# Patient Record
Sex: MALE | Race: WHITE | NOT HISPANIC OR LATINO | Employment: OTHER | URBAN - METROPOLITAN AREA
[De-identification: names, ages, dates, MRNs, and addresses within clinical notes are randomized per-mention and may not be internally consistent; named-entity substitution may affect disease eponyms.]

---

## 2017-01-04 ENCOUNTER — ALLSCRIPTS OFFICE VISIT (OUTPATIENT)
Dept: OTHER | Facility: OTHER | Age: 67
End: 2017-01-04

## 2017-01-17 ENCOUNTER — ALLSCRIPTS OFFICE VISIT (OUTPATIENT)
Dept: OTHER | Facility: OTHER | Age: 67
End: 2017-01-17

## 2017-01-17 ENCOUNTER — APPOINTMENT (OUTPATIENT)
Dept: LAB | Facility: MEDICAL CENTER | Age: 67
End: 2017-01-17
Payer: MEDICARE

## 2017-01-17 DIAGNOSIS — I48.91 ATRIAL FIBRILLATION (HCC): ICD-10-CM

## 2017-01-17 LAB
ANION GAP SERPL CALCULATED.3IONS-SCNC: 7 MMOL/L (ref 4–13)
BUN SERPL-MCNC: 16 MG/DL (ref 5–25)
CALCIUM SERPL-MCNC: 9 MG/DL (ref 8.3–10.1)
CHLORIDE SERPL-SCNC: 99 MMOL/L (ref 100–108)
CO2 SERPL-SCNC: 31 MMOL/L (ref 21–32)
CREAT SERPL-MCNC: 0.91 MG/DL (ref 0.6–1.3)
GFR SERPL CREATININE-BSD FRML MDRD: >60 ML/MIN/1.73SQ M
GLUCOSE SERPL-MCNC: 95 MG/DL (ref 65–140)
POTASSIUM SERPL-SCNC: 4.3 MMOL/L (ref 3.5–5.3)
SODIUM SERPL-SCNC: 137 MMOL/L (ref 136–145)

## 2017-01-17 PROCEDURE — 36415 COLL VENOUS BLD VENIPUNCTURE: CPT

## 2017-01-17 PROCEDURE — 80048 BASIC METABOLIC PNL TOTAL CA: CPT

## 2017-02-10 ENCOUNTER — ALLSCRIPTS OFFICE VISIT (OUTPATIENT)
Dept: OTHER | Facility: OTHER | Age: 67
End: 2017-02-10

## 2017-03-13 ENCOUNTER — ALLSCRIPTS OFFICE VISIT (OUTPATIENT)
Dept: OTHER | Facility: OTHER | Age: 67
End: 2017-03-13

## 2017-03-20 ENCOUNTER — ALLSCRIPTS OFFICE VISIT (OUTPATIENT)
Dept: OTHER | Facility: OTHER | Age: 67
End: 2017-03-20

## 2017-04-18 ENCOUNTER — ALLSCRIPTS OFFICE VISIT (OUTPATIENT)
Dept: OTHER | Facility: OTHER | Age: 67
End: 2017-04-18

## 2017-04-20 ENCOUNTER — ANESTHESIA EVENT (OUTPATIENT)
Dept: PERIOP | Facility: HOSPITAL | Age: 67
End: 2017-04-20

## 2017-04-21 ENCOUNTER — ANESTHESIA (OUTPATIENT)
Dept: PERIOP | Facility: HOSPITAL | Age: 67
End: 2017-04-21

## 2017-05-15 ENCOUNTER — ALLSCRIPTS OFFICE VISIT (OUTPATIENT)
Dept: OTHER | Facility: OTHER | Age: 67
End: 2017-05-15

## 2017-05-15 DIAGNOSIS — I10 ESSENTIAL (PRIMARY) HYPERTENSION: ICD-10-CM

## 2017-06-02 ENCOUNTER — ALLSCRIPTS OFFICE VISIT (OUTPATIENT)
Dept: OTHER | Facility: OTHER | Age: 67
End: 2017-06-02

## 2017-06-25 ENCOUNTER — APPOINTMENT (EMERGENCY)
Dept: RADIOLOGY | Facility: HOSPITAL | Age: 67
DRG: 248 | End: 2017-06-25
Payer: MEDICARE

## 2017-06-25 ENCOUNTER — HOSPITAL ENCOUNTER (INPATIENT)
Facility: HOSPITAL | Age: 67
LOS: 5 days | Discharge: HOME HEALTH | DRG: 248 | End: 2017-06-30
Attending: EMERGENCY MEDICINE | Admitting: INTERNAL MEDICINE
Payer: MEDICARE

## 2017-06-25 DIAGNOSIS — I10 HYPERTENSION, ESSENTIAL, BENIGN: ICD-10-CM

## 2017-06-25 DIAGNOSIS — I10 HYPERTENSION: ICD-10-CM

## 2017-06-25 DIAGNOSIS — I25.10 CAD (CORONARY ARTERY DISEASE): ICD-10-CM

## 2017-06-25 DIAGNOSIS — I48.91 A-FIB (HCC): ICD-10-CM

## 2017-06-25 DIAGNOSIS — I50.33 ACUTE ON CHRONIC DIASTOLIC CHF (CONGESTIVE HEART FAILURE), NYHA CLASS 1 (HCC): ICD-10-CM

## 2017-06-25 DIAGNOSIS — I50.9 CHF, ACUTE (HCC): ICD-10-CM

## 2017-06-25 DIAGNOSIS — R07.9 CHEST PAIN: Primary | ICD-10-CM

## 2017-06-25 LAB
ALBUMIN SERPL BCP-MCNC: 3.9 G/DL (ref 3.5–5)
ALP SERPL-CCNC: 122 U/L (ref 46–116)
ALT SERPL W P-5'-P-CCNC: 22 U/L (ref 12–78)
ANION GAP SERPL CALCULATED.3IONS-SCNC: 10 MMOL/L (ref 4–13)
APTT PPP: 33 SECONDS (ref 23–35)
AST SERPL W P-5'-P-CCNC: 25 U/L (ref 5–45)
BASOPHILS # BLD AUTO: 0.02 THOUSANDS/ΜL (ref 0–0.1)
BASOPHILS NFR BLD AUTO: 0 % (ref 0–1)
BILIRUB SERPL-MCNC: 1 MG/DL (ref 0.2–1)
BUN SERPL-MCNC: 11 MG/DL (ref 5–25)
CALCIUM SERPL-MCNC: 9.6 MG/DL (ref 8.3–10.1)
CHLORIDE SERPL-SCNC: 98 MMOL/L (ref 100–108)
CO2 SERPL-SCNC: 27 MMOL/L (ref 21–32)
CREAT SERPL-MCNC: 0.82 MG/DL (ref 0.6–1.3)
EOSINOPHIL # BLD AUTO: 0.05 THOUSAND/ΜL (ref 0–0.61)
EOSINOPHIL NFR BLD AUTO: 1 % (ref 0–6)
ERYTHROCYTE [DISTWIDTH] IN BLOOD BY AUTOMATED COUNT: 13.3 % (ref 11.6–15.1)
GFR SERPL CREATININE-BSD FRML MDRD: >60 ML/MIN/1.73SQ M
GLUCOSE SERPL-MCNC: 89 MG/DL (ref 65–140)
HCT VFR BLD AUTO: 35.1 % (ref 36.5–49.3)
HGB BLD-MCNC: 11.7 G/DL (ref 12–17)
INR PPP: 1.21 (ref 0.86–1.16)
LYMPHOCYTES # BLD AUTO: 0.71 THOUSANDS/ΜL (ref 0.6–4.47)
LYMPHOCYTES NFR BLD AUTO: 14 % (ref 14–44)
MCH RBC QN AUTO: 32.7 PG (ref 26.8–34.3)
MCHC RBC AUTO-ENTMCNC: 33.3 G/DL (ref 31.4–37.4)
MCV RBC AUTO: 98 FL (ref 82–98)
MONOCYTES # BLD AUTO: 0.39 THOUSAND/ΜL (ref 0.17–1.22)
MONOCYTES NFR BLD AUTO: 8 % (ref 4–12)
NEUTROPHILS # BLD AUTO: 3.75 THOUSANDS/ΜL (ref 1.85–7.62)
NEUTS SEG NFR BLD AUTO: 76 % (ref 43–75)
NRBC BLD AUTO-RTO: 0 /100 WBCS
NT-PROBNP SERPL-MCNC: 922 PG/ML
PLATELET # BLD AUTO: 158 THOUSANDS/UL (ref 149–390)
PMV BLD AUTO: 9.7 FL (ref 8.9–12.7)
POTASSIUM SERPL-SCNC: 4.2 MMOL/L (ref 3.5–5.3)
PROT SERPL-MCNC: 7.4 G/DL (ref 6.4–8.2)
PROTHROMBIN TIME: 15.5 SECONDS (ref 12.1–14.4)
RBC # BLD AUTO: 3.58 MILLION/UL (ref 3.88–5.62)
SODIUM SERPL-SCNC: 135 MMOL/L (ref 136–145)
TROPONIN I SERPL-MCNC: 0.02 NG/ML
TROPONIN I SERPL-MCNC: <0.02 NG/ML
TROPONIN I SERPL-MCNC: <0.02 NG/ML
WBC # BLD AUTO: 4.94 THOUSAND/UL (ref 4.31–10.16)

## 2017-06-25 PROCEDURE — 93005 ELECTROCARDIOGRAM TRACING: CPT | Performed by: PHYSICIAN ASSISTANT

## 2017-06-25 PROCEDURE — 96374 THER/PROPH/DIAG INJ IV PUSH: CPT

## 2017-06-25 PROCEDURE — 71020 HB CHEST X-RAY 2VW FRONTAL&LATL: CPT

## 2017-06-25 PROCEDURE — 84484 ASSAY OF TROPONIN QUANT: CPT | Performed by: PHYSICIAN ASSISTANT

## 2017-06-25 PROCEDURE — 99285 EMERGENCY DEPT VISIT HI MDM: CPT

## 2017-06-25 PROCEDURE — 84484 ASSAY OF TROPONIN QUANT: CPT | Performed by: INTERNAL MEDICINE

## 2017-06-25 PROCEDURE — 85025 COMPLETE CBC W/AUTO DIFF WBC: CPT | Performed by: PHYSICIAN ASSISTANT

## 2017-06-25 PROCEDURE — 80053 COMPREHEN METABOLIC PANEL: CPT | Performed by: PHYSICIAN ASSISTANT

## 2017-06-25 PROCEDURE — 85730 THROMBOPLASTIN TIME PARTIAL: CPT | Performed by: PHYSICIAN ASSISTANT

## 2017-06-25 PROCEDURE — 85610 PROTHROMBIN TIME: CPT | Performed by: PHYSICIAN ASSISTANT

## 2017-06-25 PROCEDURE — 36415 COLL VENOUS BLD VENIPUNCTURE: CPT | Performed by: PHYSICIAN ASSISTANT

## 2017-06-25 PROCEDURE — 83880 ASSAY OF NATRIURETIC PEPTIDE: CPT | Performed by: PHYSICIAN ASSISTANT

## 2017-06-25 RX ORDER — ATORVASTATIN CALCIUM 40 MG/1
40 TABLET, FILM COATED ORAL DAILY
Status: DISCONTINUED | OUTPATIENT
Start: 2017-06-25 | End: 2017-06-30 | Stop reason: HOSPADM

## 2017-06-25 RX ORDER — OXYCODONE HYDROCHLORIDE AND ACETAMINOPHEN 5; 325 MG/1; MG/1
1 TABLET ORAL EVERY 6 HOURS PRN
Status: DISCONTINUED | OUTPATIENT
Start: 2017-06-25 | End: 2017-06-26

## 2017-06-25 RX ORDER — BUDESONIDE AND FORMOTEROL FUMARATE DIHYDRATE 160; 4.5 UG/1; UG/1
2 AEROSOL RESPIRATORY (INHALATION) 2 TIMES DAILY
Status: DISCONTINUED | OUTPATIENT
Start: 2017-06-25 | End: 2017-06-30 | Stop reason: HOSPADM

## 2017-06-25 RX ORDER — LORAZEPAM 2 MG/ML
0.5 INJECTION INTRAMUSCULAR ONCE
Status: COMPLETED | OUTPATIENT
Start: 2017-06-25 | End: 2017-06-25

## 2017-06-25 RX ORDER — SPIRONOLACTONE 25 MG/1
50 TABLET ORAL DAILY
Status: DISCONTINUED | OUTPATIENT
Start: 2017-06-25 | End: 2017-06-30 | Stop reason: HOSPADM

## 2017-06-25 RX ORDER — FUROSEMIDE 10 MG/ML
40 INJECTION INTRAMUSCULAR; INTRAVENOUS 2 TIMES DAILY
Status: DISCONTINUED | OUTPATIENT
Start: 2017-06-25 | End: 2017-06-25

## 2017-06-25 RX ORDER — LIDOCAINE 50 MG/G
1 PATCH TOPICAL DAILY
Status: DISCONTINUED | OUTPATIENT
Start: 2017-06-25 | End: 2017-06-30 | Stop reason: HOSPADM

## 2017-06-25 RX ORDER — NITROGLYCERIN 0.4 MG/1
0.4 TABLET SUBLINGUAL
Status: DISCONTINUED | OUTPATIENT
Start: 2017-06-25 | End: 2017-06-27

## 2017-06-25 RX ORDER — FUROSEMIDE 10 MG/ML
20 INJECTION INTRAMUSCULAR; INTRAVENOUS 2 TIMES DAILY
Status: DISCONTINUED | OUTPATIENT
Start: 2017-06-26 | End: 2017-06-28

## 2017-06-25 RX ORDER — CLOPIDOGREL BISULFATE 75 MG/1
75 TABLET ORAL DAILY
Status: DISCONTINUED | OUTPATIENT
Start: 2017-06-25 | End: 2017-06-30 | Stop reason: HOSPADM

## 2017-06-25 RX ORDER — MORPHINE SULFATE 2 MG/ML
2 INJECTION, SOLUTION INTRAMUSCULAR; INTRAVENOUS EVERY 4 HOURS PRN
Status: DISCONTINUED | OUTPATIENT
Start: 2017-06-25 | End: 2017-06-30 | Stop reason: HOSPADM

## 2017-06-25 RX ORDER — ONDANSETRON 2 MG/ML
4 INJECTION INTRAMUSCULAR; INTRAVENOUS EVERY 6 HOURS PRN
Status: DISCONTINUED | OUTPATIENT
Start: 2017-06-25 | End: 2017-06-27

## 2017-06-25 RX ORDER — QUETIAPINE FUMARATE 25 MG/1
50 TABLET, FILM COATED ORAL
Status: DISCONTINUED | OUTPATIENT
Start: 2017-06-25 | End: 2017-06-30 | Stop reason: HOSPADM

## 2017-06-25 RX ORDER — ALBUTEROL SULFATE 2.5 MG/3ML
2.5 SOLUTION RESPIRATORY (INHALATION) EVERY 6 HOURS PRN
Status: DISCONTINUED | OUTPATIENT
Start: 2017-06-25 | End: 2017-06-30 | Stop reason: HOSPADM

## 2017-06-25 RX ORDER — HEPARIN SODIUM 10000 [USP'U]/100ML
3-20 INJECTION, SOLUTION INTRAVENOUS
Status: DISCONTINUED | OUTPATIENT
Start: 2017-06-25 | End: 2017-06-27

## 2017-06-25 RX ORDER — ISOSORBIDE MONONITRATE 30 MG/1
30 TABLET, EXTENDED RELEASE ORAL DAILY
Status: DISCONTINUED | OUTPATIENT
Start: 2017-06-25 | End: 2017-06-30 | Stop reason: HOSPADM

## 2017-06-25 RX ORDER — HEPARIN SODIUM 1000 [USP'U]/ML
4000 INJECTION, SOLUTION INTRAVENOUS; SUBCUTANEOUS AS NEEDED
Status: DISCONTINUED | OUTPATIENT
Start: 2017-06-25 | End: 2017-06-27

## 2017-06-25 RX ORDER — DULOXETIN HYDROCHLORIDE 30 MG/1
60 CAPSULE, DELAYED RELEASE ORAL DAILY
Status: DISCONTINUED | OUTPATIENT
Start: 2017-06-25 | End: 2017-06-30 | Stop reason: HOSPADM

## 2017-06-25 RX ORDER — HEPARIN SODIUM 1000 [USP'U]/ML
4000 INJECTION, SOLUTION INTRAVENOUS; SUBCUTANEOUS ONCE
Status: COMPLETED | OUTPATIENT
Start: 2017-06-25 | End: 2017-06-25

## 2017-06-25 RX ORDER — HEPARIN SODIUM 1000 [USP'U]/ML
2000 INJECTION, SOLUTION INTRAVENOUS; SUBCUTANEOUS AS NEEDED
Status: DISCONTINUED | OUTPATIENT
Start: 2017-06-25 | End: 2017-06-27

## 2017-06-25 RX ORDER — LORAZEPAM 1 MG/1
2 TABLET ORAL EVERY 8 HOURS PRN
Status: DISCONTINUED | OUTPATIENT
Start: 2017-06-25 | End: 2017-06-26

## 2017-06-25 RX ORDER — DILTIAZEM HYDROCHLORIDE 180 MG/1
180 CAPSULE, COATED, EXTENDED RELEASE ORAL DAILY
Status: DISCONTINUED | OUTPATIENT
Start: 2017-06-26 | End: 2017-06-29

## 2017-06-25 RX ORDER — FUROSEMIDE 10 MG/ML
40 INJECTION INTRAMUSCULAR; INTRAVENOUS ONCE
Status: COMPLETED | OUTPATIENT
Start: 2017-06-25 | End: 2017-06-25

## 2017-06-25 RX ORDER — ZOLPIDEM TARTRATE 5 MG/1
5 TABLET ORAL
Status: DISCONTINUED | OUTPATIENT
Start: 2017-06-25 | End: 2017-06-30 | Stop reason: HOSPADM

## 2017-06-25 RX ADMIN — ISOSORBIDE MONONITRATE 30 MG: 30 TABLET, EXTENDED RELEASE ORAL at 17:59

## 2017-06-25 RX ADMIN — SPIRONOLACTONE 50 MG: 25 TABLET, FILM COATED ORAL at 18:02

## 2017-06-25 RX ADMIN — LORAZEPAM 0.5 MG: 2 INJECTION INTRAMUSCULAR; INTRAVENOUS at 13:44

## 2017-06-25 RX ADMIN — LORAZEPAM 2 MG: 1 TABLET ORAL at 18:25

## 2017-06-25 RX ADMIN — CLOPIDOGREL 75 MG: 75 TABLET, FILM COATED ORAL at 16:10

## 2017-06-25 RX ADMIN — ATORVASTATIN CALCIUM 40 MG: 40 TABLET, FILM COATED ORAL at 18:03

## 2017-06-25 RX ADMIN — HEPARIN SODIUM 4000 UNITS: 1000 INJECTION, SOLUTION INTRAVENOUS; SUBCUTANEOUS at 19:10

## 2017-06-25 RX ADMIN — DULOXETINE 60 MG: 60 CAPSULE, DELAYED RELEASE ORAL at 17:50

## 2017-06-25 RX ADMIN — BUDESONIDE AND FORMOTEROL FUMARATE DIHYDRATE 2 PUFF: 160; 4.5 AEROSOL RESPIRATORY (INHALATION) at 18:05

## 2017-06-25 RX ADMIN — FUROSEMIDE 40 MG: 10 INJECTION, SOLUTION INTRAMUSCULAR; INTRAVENOUS at 14:28

## 2017-06-25 RX ADMIN — OXYCODONE HYDROCHLORIDE AND ACETAMINOPHEN 1 TABLET: 5; 325 TABLET ORAL at 22:12

## 2017-06-25 RX ADMIN — QUETIAPINE FUMARATE 50 MG: 25 TABLET, FILM COATED ORAL at 22:11

## 2017-06-25 RX ADMIN — ALBUTEROL SULFATE 2.5 MG: 2.5 SOLUTION RESPIRATORY (INHALATION) at 15:21

## 2017-06-25 RX ADMIN — OXYCODONE HYDROCHLORIDE AND ACETAMINOPHEN 1 TABLET: 5; 325 TABLET ORAL at 16:11

## 2017-06-25 RX ADMIN — HEPARIN SODIUM AND DEXTROSE 11.1 UNITS/KG/HR: 10000; 5 INJECTION INTRAVENOUS at 19:12

## 2017-06-26 ENCOUNTER — APPOINTMENT (INPATIENT)
Dept: ULTRASOUND IMAGING | Facility: HOSPITAL | Age: 67
DRG: 248 | End: 2017-06-26
Payer: MEDICARE

## 2017-06-26 LAB
ANION GAP SERPL CALCULATED.3IONS-SCNC: 7 MMOL/L (ref 4–13)
APTT PPP: 49 SECONDS (ref 23–35)
APTT PPP: 54 SECONDS (ref 23–35)
APTT PPP: 59 SECONDS (ref 23–35)
APTT PPP: 64 SECONDS (ref 23–35)
ATRIAL RATE: 66 BPM
BUN SERPL-MCNC: 11 MG/DL (ref 5–25)
CALCIUM SERPL-MCNC: 8.4 MG/DL (ref 8.3–10.1)
CHLORIDE SERPL-SCNC: 101 MMOL/L (ref 100–108)
CO2 SERPL-SCNC: 28 MMOL/L (ref 21–32)
CREAT SERPL-MCNC: 0.77 MG/DL (ref 0.6–1.3)
ERYTHROCYTE [DISTWIDTH] IN BLOOD BY AUTOMATED COUNT: 13.5 % (ref 11.6–15.1)
GFR SERPL CREATININE-BSD FRML MDRD: >60 ML/MIN/1.73SQ M
GLUCOSE SERPL-MCNC: 118 MG/DL (ref 65–140)
HCT VFR BLD AUTO: 31.9 % (ref 36.5–49.3)
HGB BLD-MCNC: 10.7 G/DL (ref 12–17)
MAGNESIUM SERPL-MCNC: 2 MG/DL (ref 1.6–2.6)
MCH RBC QN AUTO: 33 PG (ref 26.8–34.3)
MCHC RBC AUTO-ENTMCNC: 33.5 G/DL (ref 31.4–37.4)
MCV RBC AUTO: 99 FL (ref 82–98)
PLATELET # BLD AUTO: 142 THOUSANDS/UL (ref 149–390)
PMV BLD AUTO: 9.5 FL (ref 8.9–12.7)
POTASSIUM SERPL-SCNC: 3.6 MMOL/L (ref 3.5–5.3)
QRS AXIS: 86 DEGREES
QRSD INTERVAL: 104 MS
QT INTERVAL: 426 MS
QTC INTERVAL: 439 MS
RBC # BLD AUTO: 3.24 MILLION/UL (ref 3.88–5.62)
SODIUM SERPL-SCNC: 136 MMOL/L (ref 136–145)
T WAVE AXIS: 151 DEGREES
VENTRICULAR RATE: 64 BPM
WBC # BLD AUTO: 4.27 THOUSAND/UL (ref 4.31–10.16)

## 2017-06-26 PROCEDURE — 80048 BASIC METABOLIC PNL TOTAL CA: CPT | Performed by: INTERNAL MEDICINE

## 2017-06-26 PROCEDURE — 85730 THROMBOPLASTIN TIME PARTIAL: CPT | Performed by: INTERNAL MEDICINE

## 2017-06-26 PROCEDURE — B2181ZZ FLUOROSCOPY OF LEFT INTERNAL MAMMARY BYPASS GRAFT USING LOW OSMOLAR CONTRAST: ICD-10-PCS | Performed by: INTERNAL MEDICINE

## 2017-06-26 PROCEDURE — 02703FZ DILATION OF CORONARY ARTERY, ONE ARTERY WITH THREE INTRALUMINAL DEVICES, PERCUTANEOUS APPROACH: ICD-10-PCS | Performed by: INTERNAL MEDICINE

## 2017-06-26 PROCEDURE — B41J1ZZ FLUOROSCOPY OF OTHER LOWER ARTERIES USING LOW OSMOLAR CONTRAST: ICD-10-PCS | Performed by: INTERNAL MEDICINE

## 2017-06-26 PROCEDURE — 85027 COMPLETE CBC AUTOMATED: CPT | Performed by: INTERNAL MEDICINE

## 2017-06-26 PROCEDURE — 83735 ASSAY OF MAGNESIUM: CPT | Performed by: INTERNAL MEDICINE

## 2017-06-26 PROCEDURE — B2111ZZ FLUOROSCOPY OF MULTIPLE CORONARY ARTERIES USING LOW OSMOLAR CONTRAST: ICD-10-PCS | Performed by: INTERNAL MEDICINE

## 2017-06-26 PROCEDURE — 76705 ECHO EXAM OF ABDOMEN: CPT

## 2017-06-26 PROCEDURE — 4A023N7 MEASUREMENT OF CARDIAC SAMPLING AND PRESSURE, LEFT HEART, PERCUTANEOUS APPROACH: ICD-10-PCS | Performed by: INTERNAL MEDICINE

## 2017-06-26 RX ORDER — OXYCODONE HYDROCHLORIDE AND ACETAMINOPHEN 5; 325 MG/1; MG/1
1 TABLET ORAL EVERY 6 HOURS PRN
Status: DISCONTINUED | OUTPATIENT
Start: 2017-06-26 | End: 2017-06-28

## 2017-06-26 RX ORDER — SODIUM CHLORIDE 9 MG/ML
125 INJECTION, SOLUTION INTRAVENOUS CONTINUOUS
Status: DISCONTINUED | OUTPATIENT
Start: 2017-06-26 | End: 2017-06-27

## 2017-06-26 RX ORDER — ASPIRIN 81 MG/1
324 TABLET, CHEWABLE ORAL ONCE
Status: DISCONTINUED | OUTPATIENT
Start: 2017-06-26 | End: 2017-06-30 | Stop reason: HOSPADM

## 2017-06-26 RX ORDER — OXYCODONE HYDROCHLORIDE AND ACETAMINOPHEN 5; 325 MG/1; MG/1
1 TABLET ORAL EVERY 6 HOURS
Status: DISCONTINUED | OUTPATIENT
Start: 2017-06-26 | End: 2017-06-26

## 2017-06-26 RX ORDER — LORAZEPAM 1 MG/1
2 TABLET ORAL EVERY 8 HOURS
Status: DISCONTINUED | OUTPATIENT
Start: 2017-06-26 | End: 2017-06-26

## 2017-06-26 RX ORDER — LORAZEPAM 1 MG/1
2 TABLET ORAL EVERY 8 HOURS PRN
Status: DISCONTINUED | OUTPATIENT
Start: 2017-06-26 | End: 2017-06-30 | Stop reason: HOSPADM

## 2017-06-26 RX ADMIN — OXYCODONE HYDROCHLORIDE AND ACETAMINOPHEN 1 TABLET: 5; 325 TABLET ORAL at 10:32

## 2017-06-26 RX ADMIN — HEPARIN SODIUM 2000 UNITS: 1000 INJECTION, SOLUTION INTRAVENOUS; SUBCUTANEOUS at 15:19

## 2017-06-26 RX ADMIN — DILTIAZEM HYDROCHLORIDE 180 MG: 180 CAPSULE, COATED, EXTENDED RELEASE ORAL at 08:07

## 2017-06-26 RX ADMIN — LORAZEPAM 2 MG: 1 TABLET ORAL at 04:37

## 2017-06-26 RX ADMIN — HEPARIN SODIUM 2000 UNITS: 1000 INJECTION, SOLUTION INTRAVENOUS; SUBCUTANEOUS at 22:32

## 2017-06-26 RX ADMIN — OXYCODONE HYDROCHLORIDE AND ACETAMINOPHEN 1 TABLET: 5; 325 TABLET ORAL at 22:37

## 2017-06-26 RX ADMIN — BUDESONIDE AND FORMOTEROL FUMARATE DIHYDRATE 2 PUFF: 160; 4.5 AEROSOL RESPIRATORY (INHALATION) at 17:08

## 2017-06-26 RX ADMIN — CLOPIDOGREL 75 MG: 75 TABLET, FILM COATED ORAL at 08:08

## 2017-06-26 RX ADMIN — HEPARIN SODIUM AND DEXTROSE 15.1 UNITS/KG/HR: 10000; 5 INJECTION INTRAVENOUS at 15:17

## 2017-06-26 RX ADMIN — DULOXETINE 60 MG: 60 CAPSULE, DELAYED RELEASE ORAL at 08:07

## 2017-06-26 RX ADMIN — ISOSORBIDE MONONITRATE 30 MG: 30 TABLET, EXTENDED RELEASE ORAL at 08:10

## 2017-06-26 RX ADMIN — SODIUM CHLORIDE 125 ML/HR: 0.9 INJECTION, SOLUTION INTRAVENOUS at 23:55

## 2017-06-26 RX ADMIN — OXYCODONE HYDROCHLORIDE AND ACETAMINOPHEN 1 TABLET: 5; 325 TABLET ORAL at 16:56

## 2017-06-26 RX ADMIN — ZOLPIDEM TARTRATE 5 MG: 5 TABLET, COATED ORAL at 22:02

## 2017-06-26 RX ADMIN — QUETIAPINE FUMARATE 50 MG: 25 TABLET, FILM COATED ORAL at 22:02

## 2017-06-26 RX ADMIN — OXYCODONE HYDROCHLORIDE AND ACETAMINOPHEN 1 TABLET: 5; 325 TABLET ORAL at 05:11

## 2017-06-26 RX ADMIN — SPIRONOLACTONE 50 MG: 25 TABLET, FILM COATED ORAL at 08:07

## 2017-06-26 RX ADMIN — HEPARIN SODIUM 2000 UNITS: 1000 INJECTION, SOLUTION INTRAVENOUS; SUBCUTANEOUS at 02:23

## 2017-06-26 RX ADMIN — LIDOCAINE 1 PATCH: 50 PATCH CUTANEOUS at 08:08

## 2017-06-26 RX ADMIN — FUROSEMIDE 20 MG: 10 INJECTION, SOLUTION INTRAMUSCULAR; INTRAVENOUS at 17:08

## 2017-06-26 RX ADMIN — BUDESONIDE AND FORMOTEROL FUMARATE DIHYDRATE 2 PUFF: 160; 4.5 AEROSOL RESPIRATORY (INHALATION) at 08:07

## 2017-06-26 RX ADMIN — ATORVASTATIN CALCIUM 40 MG: 40 TABLET, FILM COATED ORAL at 16:56

## 2017-06-26 RX ADMIN — FUROSEMIDE 20 MG: 10 INJECTION, SOLUTION INTRAMUSCULAR; INTRAVENOUS at 08:07

## 2017-06-26 RX ADMIN — LORAZEPAM 2 MG: 1 TABLET ORAL at 11:49

## 2017-06-26 RX ADMIN — LORAZEPAM 2 MG: 1 TABLET ORAL at 19:56

## 2017-06-26 RX ADMIN — ZOLPIDEM TARTRATE 5 MG: 5 TABLET, COATED ORAL at 00:07

## 2017-06-27 ENCOUNTER — APPOINTMENT (INPATIENT)
Dept: INTERVENTIONAL RADIOLOGY/VASCULAR | Facility: HOSPITAL | Age: 67
DRG: 248 | End: 2017-06-27
Attending: INTERNAL MEDICINE
Payer: MEDICARE

## 2017-06-27 ENCOUNTER — APPOINTMENT (INPATIENT)
Dept: NON INVASIVE DIAGNOSTICS | Facility: HOSPITAL | Age: 67
DRG: 248 | End: 2017-06-27
Payer: MEDICARE

## 2017-06-27 LAB
ANION GAP SERPL CALCULATED.3IONS-SCNC: 9 MMOL/L (ref 4–13)
APTT PPP: 118 SECONDS (ref 23–35)
ATRIAL RATE: 375 BPM
BASOPHILS # BLD AUTO: 0.03 THOUSANDS/ΜL (ref 0–0.1)
BASOPHILS NFR BLD AUTO: 1 % (ref 0–1)
BUN SERPL-MCNC: 12 MG/DL (ref 5–25)
CALCIUM SERPL-MCNC: 8.8 MG/DL (ref 8.3–10.1)
CHLORIDE SERPL-SCNC: 103 MMOL/L (ref 100–108)
CHOLEST SERPL-MCNC: 115 MG/DL (ref 50–200)
CO2 SERPL-SCNC: 28 MMOL/L (ref 21–32)
CREAT SERPL-MCNC: 0.79 MG/DL (ref 0.6–1.3)
EOSINOPHIL # BLD AUTO: 0.06 THOUSAND/ΜL (ref 0–0.61)
EOSINOPHIL NFR BLD AUTO: 1 % (ref 0–6)
ERYTHROCYTE [DISTWIDTH] IN BLOOD BY AUTOMATED COUNT: 13.7 % (ref 11.6–15.1)
GFR SERPL CREATININE-BSD FRML MDRD: >60 ML/MIN/1.73SQ M
GLUCOSE SERPL-MCNC: 104 MG/DL (ref 65–140)
HCT VFR BLD AUTO: 33.7 % (ref 36.5–49.3)
HDLC SERPL-MCNC: 62 MG/DL (ref 40–60)
HGB BLD-MCNC: 11.1 G/DL (ref 12–17)
INR PPP: 1.19 (ref 0.86–1.16)
KCT BLD-ACNC: 166 SEC (ref 89–137)
KCT BLD-ACNC: 183 SEC (ref 89–137)
LDLC SERPL CALC-MCNC: 48 MG/DL (ref 0–100)
LYMPHOCYTES # BLD AUTO: 0.81 THOUSANDS/ΜL (ref 0.6–4.47)
LYMPHOCYTES NFR BLD AUTO: 17 % (ref 14–44)
MAGNESIUM SERPL-MCNC: 2 MG/DL (ref 1.6–2.6)
MCH RBC QN AUTO: 32.6 PG (ref 26.8–34.3)
MCHC RBC AUTO-ENTMCNC: 32.9 G/DL (ref 31.4–37.4)
MCV RBC AUTO: 99 FL (ref 82–98)
MONOCYTES # BLD AUTO: 0.6 THOUSAND/ΜL (ref 0.17–1.22)
MONOCYTES NFR BLD AUTO: 13 % (ref 4–12)
NEUTROPHILS # BLD AUTO: 3.23 THOUSANDS/ΜL (ref 1.85–7.62)
NEUTS SEG NFR BLD AUTO: 68 % (ref 43–75)
NRBC BLD AUTO-RTO: 0 /100 WBCS
PLATELET # BLD AUTO: 126 THOUSANDS/UL (ref 149–390)
PMV BLD AUTO: 9.4 FL (ref 8.9–12.7)
POTASSIUM SERPL-SCNC: 3.4 MMOL/L (ref 3.5–5.3)
PROTHROMBIN TIME: 15.3 SECONDS (ref 12.1–14.4)
QRS AXIS: 70 DEGREES
QRSD INTERVAL: 104 MS
QT INTERVAL: 590 MS
QTC INTERVAL: 537 MS
RBC # BLD AUTO: 3.4 MILLION/UL (ref 3.88–5.62)
SODIUM SERPL-SCNC: 140 MMOL/L (ref 136–145)
SPECIMEN SOURCE: ABNORMAL
SPECIMEN SOURCE: ABNORMAL
T WAVE AXIS: 0 DEGREES
TRIGL SERPL-MCNC: 26 MG/DL
VENTRICULAR RATE: 50 BPM
WBC # BLD AUTO: 4.75 THOUSAND/UL (ref 4.31–10.16)

## 2017-06-27 PROCEDURE — 93456 R HRT CORONARY ARTERY ANGIO: CPT | Performed by: INTERNAL MEDICINE

## 2017-06-27 PROCEDURE — C1887 CATHETER, GUIDING: HCPCS | Performed by: INTERNAL MEDICINE

## 2017-06-27 PROCEDURE — C1876 STENT, NON-COA/NON-COV W/DEL: HCPCS

## 2017-06-27 PROCEDURE — C1894 INTRO/SHEATH, NON-LASER: HCPCS | Performed by: INTERNAL MEDICINE

## 2017-06-27 PROCEDURE — 80061 LIPID PANEL: CPT | Performed by: INTERNAL MEDICINE

## 2017-06-27 PROCEDURE — C1769 GUIDE WIRE: HCPCS | Performed by: INTERNAL MEDICINE

## 2017-06-27 PROCEDURE — 92928 PRQ TCAT PLMT NTRAC ST 1 LES: CPT | Performed by: INTERNAL MEDICINE

## 2017-06-27 PROCEDURE — 83735 ASSAY OF MAGNESIUM: CPT | Performed by: INTERNAL MEDICINE

## 2017-06-27 PROCEDURE — C1725 CATH, TRANSLUMIN NON-LASER: HCPCS | Performed by: INTERNAL MEDICINE

## 2017-06-27 PROCEDURE — 80048 BASIC METABOLIC PNL TOTAL CA: CPT | Performed by: INTERNAL MEDICINE

## 2017-06-27 PROCEDURE — 99153 MOD SED SAME PHYS/QHP EA: CPT | Performed by: INTERNAL MEDICINE

## 2017-06-27 PROCEDURE — 85025 COMPLETE CBC W/AUTO DIFF WBC: CPT | Performed by: INTERNAL MEDICINE

## 2017-06-27 PROCEDURE — 93306 TTE W/DOPPLER COMPLETE: CPT

## 2017-06-27 PROCEDURE — 85730 THROMBOPLASTIN TIME PARTIAL: CPT | Performed by: INTERNAL MEDICINE

## 2017-06-27 PROCEDURE — 99152 MOD SED SAME PHYS/QHP 5/>YRS: CPT | Performed by: INTERNAL MEDICINE

## 2017-06-27 PROCEDURE — 85610 PROTHROMBIN TIME: CPT | Performed by: INTERNAL MEDICINE

## 2017-06-27 PROCEDURE — 82810 BLOOD GASES O2 SAT ONLY: CPT | Performed by: INTERNAL MEDICINE

## 2017-06-27 PROCEDURE — 93005 ELECTROCARDIOGRAM TRACING: CPT | Performed by: INTERNAL MEDICINE

## 2017-06-27 PROCEDURE — 85347 COAGULATION TIME ACTIVATED: CPT

## 2017-06-27 PROCEDURE — 93458 L HRT ARTERY/VENTRICLE ANGIO: CPT | Performed by: INTERNAL MEDICINE

## 2017-06-27 RX ORDER — LIDOCAINE HYDROCHLORIDE 10 MG/ML
INJECTION, SOLUTION INFILTRATION; PERINEURAL CODE/TRAUMA/SEDATION MEDICATION
Status: COMPLETED | OUTPATIENT
Start: 2017-06-27 | End: 2017-06-27

## 2017-06-27 RX ORDER — MAGNESIUM HYDROXIDE/ALUMINUM HYDROXICE/SIMETHICONE 120; 1200; 1200 MG/30ML; MG/30ML; MG/30ML
30 SUSPENSION ORAL EVERY 6 HOURS PRN
Status: DISCONTINUED | OUTPATIENT
Start: 2017-06-27 | End: 2017-06-30 | Stop reason: HOSPADM

## 2017-06-27 RX ORDER — POTASSIUM CHLORIDE 20 MEQ/1
40 TABLET, EXTENDED RELEASE ORAL ONCE
Status: DISCONTINUED | OUTPATIENT
Start: 2017-06-27 | End: 2017-06-30 | Stop reason: HOSPADM

## 2017-06-27 RX ORDER — ONDANSETRON 2 MG/ML
4 INJECTION INTRAMUSCULAR; INTRAVENOUS EVERY 6 HOURS PRN
Status: DISCONTINUED | OUTPATIENT
Start: 2017-06-27 | End: 2017-06-30 | Stop reason: HOSPADM

## 2017-06-27 RX ORDER — CLOPIDOGREL BISULFATE 75 MG/1
TABLET ORAL CODE/TRAUMA/SEDATION MEDICATION
Status: COMPLETED | OUTPATIENT
Start: 2017-06-27 | End: 2017-06-27

## 2017-06-27 RX ORDER — FENTANYL CITRATE 50 UG/ML
INJECTION, SOLUTION INTRAMUSCULAR; INTRAVENOUS CODE/TRAUMA/SEDATION MEDICATION
Status: COMPLETED | OUTPATIENT
Start: 2017-06-27 | End: 2017-06-27

## 2017-06-27 RX ORDER — NITROGLYCERIN 0.4 MG/1
0.4 TABLET SUBLINGUAL
Status: DISCONTINUED | OUTPATIENT
Start: 2017-06-27 | End: 2017-06-30 | Stop reason: HOSPADM

## 2017-06-27 RX ORDER — MIDAZOLAM HYDROCHLORIDE 1 MG/ML
INJECTION INTRAMUSCULAR; INTRAVENOUS CODE/TRAUMA/SEDATION MEDICATION
Status: COMPLETED | OUTPATIENT
Start: 2017-06-27 | End: 2017-06-27

## 2017-06-27 RX ADMIN — MORPHINE SULFATE 2 MG: 2 INJECTION, SOLUTION INTRAMUSCULAR; INTRAVENOUS at 16:42

## 2017-06-27 RX ADMIN — SPIRONOLACTONE 50 MG: 25 TABLET, FILM COATED ORAL at 09:55

## 2017-06-27 RX ADMIN — DULOXETINE 60 MG: 60 CAPSULE, DELAYED RELEASE ORAL at 09:54

## 2017-06-27 RX ADMIN — MIDAZOLAM HYDROCHLORIDE 1 MG: 1 INJECTION, SOLUTION INTRAMUSCULAR; INTRAVENOUS at 11:18

## 2017-06-27 RX ADMIN — LORAZEPAM 2 MG: 1 TABLET ORAL at 12:25

## 2017-06-27 RX ADMIN — IOHEXOL 225 ML: 350 INJECTION, SOLUTION INTRAVENOUS at 11:49

## 2017-06-27 RX ADMIN — LIDOCAINE HYDROCHLORIDE 10 ML: 10 INJECTION, SOLUTION INFILTRATION; PERINEURAL at 10:21

## 2017-06-27 RX ADMIN — ISOSORBIDE MONONITRATE 30 MG: 30 TABLET, EXTENDED RELEASE ORAL at 09:55

## 2017-06-27 RX ADMIN — QUETIAPINE FUMARATE 50 MG: 25 TABLET, FILM COATED ORAL at 22:14

## 2017-06-27 RX ADMIN — OXYCODONE HYDROCHLORIDE AND ACETAMINOPHEN 1 TABLET: 5; 325 TABLET ORAL at 17:56

## 2017-06-27 RX ADMIN — LORAZEPAM 2 MG: 1 TABLET ORAL at 03:38

## 2017-06-27 RX ADMIN — BIVALIRUDIN 1.83 MG/KG/HR: 250 INJECTION, POWDER, LYOPHILIZED, FOR SOLUTION INTRAVENOUS at 10:52

## 2017-06-27 RX ADMIN — OXYCODONE HYDROCHLORIDE AND ACETAMINOPHEN 1 TABLET: 5; 325 TABLET ORAL at 12:26

## 2017-06-27 RX ADMIN — ATORVASTATIN CALCIUM 40 MG: 40 TABLET, FILM COATED ORAL at 16:39

## 2017-06-27 RX ADMIN — CLOPIDOGREL 75 MG: 75 TABLET, FILM COATED ORAL at 09:54

## 2017-06-27 RX ADMIN — FUROSEMIDE 20 MG: 10 INJECTION, SOLUTION INTRAMUSCULAR; INTRAVENOUS at 17:57

## 2017-06-27 RX ADMIN — FENTANYL CITRATE 50 MCG: 50 INJECTION, SOLUTION INTRAMUSCULAR; INTRAVENOUS at 10:30

## 2017-06-27 RX ADMIN — SODIUM CHLORIDE 125 ML/HR: 0.9 INJECTION, SOLUTION INTRAVENOUS at 08:31

## 2017-06-27 RX ADMIN — LORAZEPAM 2 MG: 1 TABLET ORAL at 19:58

## 2017-06-27 RX ADMIN — SODIUM CHLORIDE 125 ML/HR: 0.9 INJECTION, SOLUTION INTRAVENOUS at 19:59

## 2017-06-27 RX ADMIN — FENTANYL CITRATE 50 MCG: 50 INJECTION, SOLUTION INTRAMUSCULAR; INTRAVENOUS at 11:18

## 2017-06-27 RX ADMIN — MIDAZOLAM HYDROCHLORIDE 1 MG: 1 INJECTION, SOLUTION INTRAMUSCULAR; INTRAVENOUS at 10:29

## 2017-06-27 RX ADMIN — MORPHINE SULFATE 2 MG: 2 INJECTION, SOLUTION INTRAMUSCULAR; INTRAVENOUS at 08:26

## 2017-06-27 RX ADMIN — FENTANYL CITRATE 50 MCG: 50 INJECTION, SOLUTION INTRAMUSCULAR; INTRAVENOUS at 10:21

## 2017-06-27 RX ADMIN — ZOLPIDEM TARTRATE 5 MG: 5 TABLET, COATED ORAL at 22:14

## 2017-06-27 RX ADMIN — OXYCODONE HYDROCHLORIDE AND ACETAMINOPHEN 1 TABLET: 5; 325 TABLET ORAL at 04:45

## 2017-06-27 RX ADMIN — BUDESONIDE AND FORMOTEROL FUMARATE DIHYDRATE 2 PUFF: 160; 4.5 AEROSOL RESPIRATORY (INHALATION) at 17:57

## 2017-06-27 RX ADMIN — DILTIAZEM HYDROCHLORIDE 180 MG: 180 CAPSULE, COATED, EXTENDED RELEASE ORAL at 09:54

## 2017-06-27 RX ADMIN — MIDAZOLAM HYDROCHLORIDE 1 MG: 1 INJECTION, SOLUTION INTRAMUSCULAR; INTRAVENOUS at 10:21

## 2017-06-27 RX ADMIN — CLOPIDOGREL 300 MG: 75 TABLET, FILM COATED ORAL at 11:49

## 2017-06-28 LAB
ANION GAP SERPL CALCULATED.3IONS-SCNC: 11 MMOL/L (ref 4–13)
BUN SERPL-MCNC: 8 MG/DL (ref 5–25)
CALCIUM SERPL-MCNC: 8.8 MG/DL (ref 8.3–10.1)
CHLORIDE SERPL-SCNC: 104 MMOL/L (ref 100–108)
CO2 SERPL-SCNC: 25 MMOL/L (ref 21–32)
CREAT SERPL-MCNC: 0.83 MG/DL (ref 0.6–1.3)
ERYTHROCYTE [DISTWIDTH] IN BLOOD BY AUTOMATED COUNT: 13.8 % (ref 11.6–15.1)
GFR SERPL CREATININE-BSD FRML MDRD: >60 ML/MIN/1.73SQ M
GLUCOSE SERPL-MCNC: 97 MG/DL (ref 65–140)
HCT VFR BLD AUTO: 33.5 % (ref 36.5–49.3)
HGB BLD-MCNC: 11.1 G/DL (ref 12–17)
MCH RBC QN AUTO: 33.1 PG (ref 26.8–34.3)
MCHC RBC AUTO-ENTMCNC: 33.1 G/DL (ref 31.4–37.4)
MCV RBC AUTO: 100 FL (ref 82–98)
PLATELET # BLD AUTO: 132 THOUSANDS/UL (ref 149–390)
PMV BLD AUTO: 9.1 FL (ref 8.9–12.7)
POTASSIUM SERPL-SCNC: 3.7 MMOL/L (ref 3.5–5.3)
RBC # BLD AUTO: 3.35 MILLION/UL (ref 3.88–5.62)
SODIUM SERPL-SCNC: 140 MMOL/L (ref 136–145)
WBC # BLD AUTO: 4.33 THOUSAND/UL (ref 4.31–10.16)

## 2017-06-28 PROCEDURE — 80048 BASIC METABOLIC PNL TOTAL CA: CPT | Performed by: FAMILY MEDICINE

## 2017-06-28 PROCEDURE — 85027 COMPLETE CBC AUTOMATED: CPT | Performed by: PHYSICIAN ASSISTANT

## 2017-06-28 RX ORDER — OXYCODONE HYDROCHLORIDE AND ACETAMINOPHEN 5; 325 MG/1; MG/1
2 TABLET ORAL EVERY 6 HOURS PRN
Status: DISCONTINUED | OUTPATIENT
Start: 2017-06-28 | End: 2017-06-30 | Stop reason: HOSPADM

## 2017-06-28 RX ORDER — OXYCODONE HYDROCHLORIDE AND ACETAMINOPHEN 5; 325 MG/1; MG/1
1 TABLET ORAL ONCE
Status: COMPLETED | OUTPATIENT
Start: 2017-06-28 | End: 2017-06-28

## 2017-06-28 RX ORDER — FUROSEMIDE 40 MG/1
40 TABLET ORAL DAILY
Status: DISCONTINUED | OUTPATIENT
Start: 2017-06-28 | End: 2017-06-30 | Stop reason: HOSPADM

## 2017-06-28 RX ADMIN — OXYCODONE HYDROCHLORIDE AND ACETAMINOPHEN 1 TABLET: 5; 325 TABLET ORAL at 00:04

## 2017-06-28 RX ADMIN — DULOXETINE 60 MG: 60 CAPSULE, DELAYED RELEASE ORAL at 09:23

## 2017-06-28 RX ADMIN — OXYCODONE HYDROCHLORIDE AND ACETAMINOPHEN 1 TABLET: 5; 325 TABLET ORAL at 11:52

## 2017-06-28 RX ADMIN — MORPHINE SULFATE 2 MG: 2 INJECTION, SOLUTION INTRAMUSCULAR; INTRAVENOUS at 09:17

## 2017-06-28 RX ADMIN — FUROSEMIDE 40 MG: 40 TABLET ORAL at 11:55

## 2017-06-28 RX ADMIN — ISOSORBIDE MONONITRATE 30 MG: 30 TABLET, EXTENDED RELEASE ORAL at 09:24

## 2017-06-28 RX ADMIN — CLOPIDOGREL 75 MG: 75 TABLET, FILM COATED ORAL at 09:23

## 2017-06-28 RX ADMIN — DILTIAZEM HYDROCHLORIDE 180 MG: 180 CAPSULE, COATED, EXTENDED RELEASE ORAL at 09:24

## 2017-06-28 RX ADMIN — OXYCODONE HYDROCHLORIDE AND ACETAMINOPHEN 2 TABLET: 5; 325 TABLET ORAL at 18:08

## 2017-06-28 RX ADMIN — OXYCODONE HYDROCHLORIDE AND ACETAMINOPHEN 1 TABLET: 5; 325 TABLET ORAL at 12:21

## 2017-06-28 RX ADMIN — LORAZEPAM 2 MG: 1 TABLET ORAL at 14:32

## 2017-06-28 RX ADMIN — LORAZEPAM 2 MG: 1 TABLET ORAL at 03:52

## 2017-06-28 RX ADMIN — BUDESONIDE AND FORMOTEROL FUMARATE DIHYDRATE 2 PUFF: 160; 4.5 AEROSOL RESPIRATORY (INHALATION) at 09:33

## 2017-06-28 RX ADMIN — FUROSEMIDE 20 MG: 10 INJECTION, SOLUTION INTRAMUSCULAR; INTRAVENOUS at 09:17

## 2017-06-28 RX ADMIN — LIDOCAINE 1 PATCH: 50 PATCH CUTANEOUS at 09:24

## 2017-06-28 RX ADMIN — OXYCODONE HYDROCHLORIDE AND ACETAMINOPHEN 1 TABLET: 5; 325 TABLET ORAL at 05:43

## 2017-06-28 RX ADMIN — ZOLPIDEM TARTRATE 5 MG: 5 TABLET, COATED ORAL at 22:44

## 2017-06-28 RX ADMIN — SPIRONOLACTONE 50 MG: 25 TABLET, FILM COATED ORAL at 09:23

## 2017-06-28 RX ADMIN — ATORVASTATIN CALCIUM 40 MG: 40 TABLET, FILM COATED ORAL at 16:16

## 2017-06-28 RX ADMIN — QUETIAPINE FUMARATE 50 MG: 25 TABLET, FILM COATED ORAL at 21:07

## 2017-06-28 RX ADMIN — MORPHINE SULFATE 2 MG: 2 INJECTION, SOLUTION INTRAMUSCULAR; INTRAVENOUS at 16:16

## 2017-06-28 RX ADMIN — BUDESONIDE AND FORMOTEROL FUMARATE DIHYDRATE 2 PUFF: 160; 4.5 AEROSOL RESPIRATORY (INHALATION) at 18:50

## 2017-06-29 PROBLEM — R00.1 BRADYCARDIA: Status: ACTIVE | Noted: 2017-06-29

## 2017-06-29 LAB
ANION GAP SERPL CALCULATED.3IONS-SCNC: 9 MMOL/L (ref 4–13)
ATRIAL RATE: 39 BPM
BUN SERPL-MCNC: 11 MG/DL (ref 5–25)
CALCIUM SERPL-MCNC: 8.6 MG/DL (ref 8.3–10.1)
CHLORIDE SERPL-SCNC: 104 MMOL/L (ref 100–108)
CO2 SERPL-SCNC: 26 MMOL/L (ref 21–32)
CREAT SERPL-MCNC: 0.75 MG/DL (ref 0.6–1.3)
GFR SERPL CREATININE-BSD FRML MDRD: >60 ML/MIN/1.73SQ M
GLUCOSE SERPL-MCNC: 96 MG/DL (ref 65–140)
POTASSIUM SERPL-SCNC: 3.5 MMOL/L (ref 3.5–5.3)
QRS AXIS: 76 DEGREES
QRSD INTERVAL: 104 MS
QT INTERVAL: 480 MS
QTC INTERVAL: 463 MS
SODIUM SERPL-SCNC: 139 MMOL/L (ref 136–145)
T WAVE AXIS: -82 DEGREES
TROPONIN I SERPL-MCNC: 0.12 NG/ML
VENTRICULAR RATE: 56 BPM

## 2017-06-29 PROCEDURE — 84484 ASSAY OF TROPONIN QUANT: CPT | Performed by: PHYSICIAN ASSISTANT

## 2017-06-29 PROCEDURE — 80048 BASIC METABOLIC PNL TOTAL CA: CPT | Performed by: FAMILY MEDICINE

## 2017-06-29 PROCEDURE — 93005 ELECTROCARDIOGRAM TRACING: CPT | Performed by: PHYSICIAN ASSISTANT

## 2017-06-29 RX ORDER — ACETAMINOPHEN 325 MG/1
650 TABLET ORAL ONCE
Status: COMPLETED | OUTPATIENT
Start: 2017-06-29 | End: 2017-06-29

## 2017-06-29 RX ORDER — POTASSIUM CHLORIDE 29.8 MG/ML
40 INJECTION INTRAVENOUS ONCE
Status: DISCONTINUED | OUTPATIENT
Start: 2017-06-29 | End: 2017-06-29

## 2017-06-29 RX ORDER — DILTIAZEM HYDROCHLORIDE 120 MG/1
120 CAPSULE, COATED, EXTENDED RELEASE ORAL DAILY
Status: DISCONTINUED | OUTPATIENT
Start: 2017-06-30 | End: 2017-06-30

## 2017-06-29 RX ADMIN — ISOSORBIDE MONONITRATE 30 MG: 30 TABLET, EXTENDED RELEASE ORAL at 09:19

## 2017-06-29 RX ADMIN — LIDOCAINE 1 PATCH: 50 PATCH CUTANEOUS at 09:20

## 2017-06-29 RX ADMIN — LORAZEPAM 2 MG: 1 TABLET ORAL at 19:02

## 2017-06-29 RX ADMIN — MORPHINE SULFATE 2 MG: 2 INJECTION, SOLUTION INTRAMUSCULAR; INTRAVENOUS at 20:03

## 2017-06-29 RX ADMIN — ATORVASTATIN CALCIUM 40 MG: 40 TABLET, FILM COATED ORAL at 16:33

## 2017-06-29 RX ADMIN — ACETAMINOPHEN 650 MG: 325 TABLET ORAL at 06:55

## 2017-06-29 RX ADMIN — SPIRONOLACTONE 50 MG: 25 TABLET, FILM COATED ORAL at 09:18

## 2017-06-29 RX ADMIN — BUDESONIDE AND FORMOTEROL FUMARATE DIHYDRATE 2 PUFF: 160; 4.5 AEROSOL RESPIRATORY (INHALATION) at 17:25

## 2017-06-29 RX ADMIN — BUDESONIDE AND FORMOTEROL FUMARATE DIHYDRATE 2 PUFF: 160; 4.5 AEROSOL RESPIRATORY (INHALATION) at 09:20

## 2017-06-29 RX ADMIN — CLOPIDOGREL 75 MG: 75 TABLET, FILM COATED ORAL at 09:17

## 2017-06-29 RX ADMIN — OXYCODONE HYDROCHLORIDE AND ACETAMINOPHEN 2 TABLET: 5; 325 TABLET ORAL at 15:26

## 2017-06-29 RX ADMIN — OXYCODONE HYDROCHLORIDE AND ACETAMINOPHEN 2 TABLET: 5; 325 TABLET ORAL at 21:25

## 2017-06-29 RX ADMIN — OXYCODONE HYDROCHLORIDE AND ACETAMINOPHEN 2 TABLET: 5; 325 TABLET ORAL at 09:14

## 2017-06-29 RX ADMIN — OXYCODONE HYDROCHLORIDE AND ACETAMINOPHEN 2 TABLET: 5; 325 TABLET ORAL at 00:31

## 2017-06-29 RX ADMIN — DILTIAZEM HYDROCHLORIDE 180 MG: 180 CAPSULE, COATED, EXTENDED RELEASE ORAL at 09:18

## 2017-06-29 RX ADMIN — LORAZEPAM 2 MG: 1 TABLET ORAL at 11:09

## 2017-06-29 RX ADMIN — DULOXETINE 60 MG: 60 CAPSULE, DELAYED RELEASE ORAL at 09:18

## 2017-06-29 RX ADMIN — FUROSEMIDE 40 MG: 40 TABLET ORAL at 09:18

## 2017-06-30 VITALS
RESPIRATION RATE: 18 BRPM | OXYGEN SATURATION: 95 % | WEIGHT: 191.58 LBS | SYSTOLIC BLOOD PRESSURE: 144 MMHG | HEIGHT: 75 IN | DIASTOLIC BLOOD PRESSURE: 67 MMHG | BODY MASS INDEX: 23.82 KG/M2 | HEART RATE: 59 BPM | TEMPERATURE: 97.8 F

## 2017-06-30 DIAGNOSIS — I25.2 OLD MYOCARDIAL INFARCTION: ICD-10-CM

## 2017-06-30 PROBLEM — R07.9 CHEST PAIN: Status: RESOLVED | Noted: 2017-06-25 | Resolved: 2017-06-30

## 2017-06-30 LAB
ANION GAP SERPL CALCULATED.3IONS-SCNC: 9 MMOL/L (ref 4–13)
BUN SERPL-MCNC: 10 MG/DL (ref 5–25)
CALCIUM SERPL-MCNC: 8.7 MG/DL (ref 8.3–10.1)
CHLORIDE SERPL-SCNC: 103 MMOL/L (ref 100–108)
CO2 SERPL-SCNC: 26 MMOL/L (ref 21–32)
CREAT SERPL-MCNC: 0.73 MG/DL (ref 0.6–1.3)
GFR SERPL CREATININE-BSD FRML MDRD: >60 ML/MIN/1.73SQ M
GLUCOSE SERPL-MCNC: 101 MG/DL (ref 65–140)
POTASSIUM SERPL-SCNC: 3.6 MMOL/L (ref 3.5–5.3)
SODIUM SERPL-SCNC: 138 MMOL/L (ref 136–145)

## 2017-06-30 PROCEDURE — 80048 BASIC METABOLIC PNL TOTAL CA: CPT | Performed by: FAMILY MEDICINE

## 2017-06-30 RX ORDER — ASPIRIN 81 MG/1
81 TABLET, CHEWABLE ORAL DAILY
Refills: 0
Start: 2017-06-30

## 2017-06-30 RX ADMIN — BUDESONIDE AND FORMOTEROL FUMARATE DIHYDRATE 2 PUFF: 160; 4.5 AEROSOL RESPIRATORY (INHALATION) at 09:13

## 2017-06-30 RX ADMIN — ZOLPIDEM TARTRATE 5 MG: 5 TABLET, COATED ORAL at 00:11

## 2017-06-30 RX ADMIN — OXYCODONE HYDROCHLORIDE AND ACETAMINOPHEN 2 TABLET: 5; 325 TABLET ORAL at 03:56

## 2017-06-30 RX ADMIN — ISOSORBIDE MONONITRATE 30 MG: 30 TABLET, EXTENDED RELEASE ORAL at 09:14

## 2017-06-30 RX ADMIN — CLOPIDOGREL 75 MG: 75 TABLET, FILM COATED ORAL at 09:15

## 2017-06-30 RX ADMIN — LIDOCAINE 1 PATCH: 50 PATCH CUTANEOUS at 09:17

## 2017-06-30 RX ADMIN — LORAZEPAM 2 MG: 1 TABLET ORAL at 07:31

## 2017-06-30 RX ADMIN — OXYCODONE HYDROCHLORIDE AND ACETAMINOPHEN 2 TABLET: 5; 325 TABLET ORAL at 09:54

## 2017-06-30 RX ADMIN — DULOXETINE 60 MG: 60 CAPSULE, DELAYED RELEASE ORAL at 09:14

## 2017-06-30 RX ADMIN — SPIRONOLACTONE 50 MG: 25 TABLET, FILM COATED ORAL at 09:14

## 2017-06-30 RX ADMIN — FUROSEMIDE 40 MG: 40 TABLET ORAL at 09:15

## 2017-07-12 ENCOUNTER — TELEPHONE (OUTPATIENT)
Dept: INPATIENT UNIT | Facility: HOSPITAL | Age: 67
End: 2017-07-12

## 2017-07-12 RX ORDER — SODIUM CHLORIDE 9 MG/ML
100 INJECTION, SOLUTION INTRAVENOUS CONTINUOUS
Status: CANCELLED | OUTPATIENT
Start: 2017-07-12

## 2017-07-12 RX ORDER — ASPIRIN 81 MG/1
324 TABLET, CHEWABLE ORAL ONCE
Status: CANCELLED | OUTPATIENT
Start: 2017-07-12 | End: 2017-07-12

## 2017-07-13 ENCOUNTER — HOSPITAL ENCOUNTER (OUTPATIENT)
Dept: NON INVASIVE DIAGNOSTICS | Facility: HOSPITAL | Age: 67
Discharge: HOME | End: 2017-07-14
Attending: INTERNAL MEDICINE | Admitting: INTERNAL MEDICINE
Payer: MEDICARE

## 2017-07-13 DIAGNOSIS — I25.2 OLD MYOCARDIAL INFARCTION: ICD-10-CM

## 2017-07-13 LAB
ANION GAP SERPL CALCULATED.3IONS-SCNC: 5 MMOL/L (ref 4–13)
ATRIAL RATE: 82 BPM
BASOPHILS # BLD AUTO: 0.03 THOUSANDS/ΜL (ref 0–0.1)
BASOPHILS NFR BLD AUTO: 1 % (ref 0–1)
BUN SERPL-MCNC: 21 MG/DL (ref 5–25)
CALCIUM SERPL-MCNC: 9.1 MG/DL (ref 8.3–10.1)
CHLORIDE SERPL-SCNC: 96 MMOL/L (ref 100–108)
CO2 SERPL-SCNC: 30 MMOL/L (ref 21–32)
CREAT SERPL-MCNC: 0.93 MG/DL (ref 0.6–1.3)
EOSINOPHIL # BLD AUTO: 0.12 THOUSAND/ΜL (ref 0–0.61)
EOSINOPHIL NFR BLD AUTO: 2 % (ref 0–6)
ERYTHROCYTE [DISTWIDTH] IN BLOOD BY AUTOMATED COUNT: 13.7 % (ref 11.6–15.1)
GFR SERPL CREATININE-BSD FRML MDRD: >60 ML/MIN/1.73SQ M
GLUCOSE P FAST SERPL-MCNC: 97 MG/DL (ref 65–99)
GLUCOSE SERPL-MCNC: 97 MG/DL (ref 65–140)
HCT VFR BLD AUTO: 35.9 % (ref 36.5–49.3)
HGB BLD-MCNC: 12.2 G/DL (ref 12–17)
INR PPP: 1.16 (ref 0.86–1.16)
LYMPHOCYTES # BLD AUTO: 0.84 THOUSANDS/ΜL (ref 0.6–4.47)
LYMPHOCYTES NFR BLD AUTO: 15 % (ref 14–44)
MCH RBC QN AUTO: 33 PG (ref 26.8–34.3)
MCHC RBC AUTO-ENTMCNC: 34 G/DL (ref 31.4–37.4)
MCV RBC AUTO: 97 FL (ref 82–98)
MONOCYTES # BLD AUTO: 0.42 THOUSAND/ΜL (ref 0.17–1.22)
MONOCYTES NFR BLD AUTO: 7 % (ref 4–12)
NEUTROPHILS # BLD AUTO: 4.33 THOUSANDS/ΜL (ref 1.85–7.62)
NEUTS SEG NFR BLD AUTO: 75 % (ref 43–75)
NRBC BLD AUTO-RTO: 0 /100 WBCS
PLATELET # BLD AUTO: 183 THOUSANDS/UL (ref 149–390)
PMV BLD AUTO: 9.1 FL (ref 8.9–12.7)
POTASSIUM SERPL-SCNC: 5.8 MMOL/L (ref 3.5–5.3)
PROTHROMBIN TIME: 14.8 SECONDS (ref 12.1–14.4)
QRS AXIS: 77 DEGREES
QRSD INTERVAL: 102 MS
QT INTERVAL: 422 MS
QTC INTERVAL: 448 MS
RBC # BLD AUTO: 3.7 MILLION/UL (ref 3.88–5.62)
SODIUM SERPL-SCNC: 131 MMOL/L (ref 136–145)
T WAVE AXIS: 7 DEGREES
VENTRICULAR RATE: 68 BPM
WBC # BLD AUTO: 5.74 THOUSAND/UL (ref 4.31–10.16)

## 2017-07-13 PROCEDURE — C1725 CATH, TRANSLUMIN NON-LASER: HCPCS | Performed by: INTERNAL MEDICINE

## 2017-07-13 PROCEDURE — 99153 MOD SED SAME PHYS/QHP EA: CPT | Performed by: INTERNAL MEDICINE

## 2017-07-13 PROCEDURE — C1769 GUIDE WIRE: HCPCS | Performed by: INTERNAL MEDICINE

## 2017-07-13 PROCEDURE — C1887 CATHETER, GUIDING: HCPCS | Performed by: INTERNAL MEDICINE

## 2017-07-13 PROCEDURE — C1760 CLOSURE DEV, VASC: HCPCS | Performed by: INTERNAL MEDICINE

## 2017-07-13 PROCEDURE — 80048 BASIC METABOLIC PNL TOTAL CA: CPT | Performed by: NURSE PRACTITIONER

## 2017-07-13 PROCEDURE — 85025 COMPLETE CBC W/AUTO DIFF WBC: CPT | Performed by: NURSE PRACTITIONER

## 2017-07-13 PROCEDURE — 85610 PROTHROMBIN TIME: CPT | Performed by: NURSE PRACTITIONER

## 2017-07-13 PROCEDURE — 99152 MOD SED SAME PHYS/QHP 5/>YRS: CPT | Performed by: INTERNAL MEDICINE

## 2017-07-13 PROCEDURE — C1894 INTRO/SHEATH, NON-LASER: HCPCS | Performed by: INTERNAL MEDICINE

## 2017-07-13 PROCEDURE — 93005 ELECTROCARDIOGRAM TRACING: CPT

## 2017-07-13 PROCEDURE — C9604 PERC D-E COR REVASC T CABG S: HCPCS | Performed by: INTERNAL MEDICINE

## 2017-07-13 PROCEDURE — C1876 STENT, NON-COA/NON-COV W/DEL: HCPCS

## 2017-07-13 PROCEDURE — 93005 ELECTROCARDIOGRAM TRACING: CPT | Performed by: NURSE PRACTITIONER

## 2017-07-13 PROCEDURE — 93455 CORONARY ART/GRFT ANGIO S&I: CPT | Performed by: INTERNAL MEDICINE

## 2017-07-13 RX ORDER — FENTANYL CITRATE 50 UG/ML
INJECTION, SOLUTION INTRAMUSCULAR; INTRAVENOUS CODE/TRAUMA/SEDATION MEDICATION
Status: COMPLETED | OUTPATIENT
Start: 2017-07-13 | End: 2017-07-13

## 2017-07-13 RX ORDER — SODIUM CHLORIDE 9 MG/ML
100 INJECTION, SOLUTION INTRAVENOUS CONTINUOUS
Status: DISPENSED | OUTPATIENT
Start: 2017-07-13 | End: 2017-07-14

## 2017-07-13 RX ORDER — LIDOCAINE HYDROCHLORIDE 10 MG/ML
INJECTION, SOLUTION INFILTRATION; PERINEURAL CODE/TRAUMA/SEDATION MEDICATION
Status: COMPLETED | OUTPATIENT
Start: 2017-07-13 | End: 2017-07-13

## 2017-07-13 RX ORDER — DULOXETIN HYDROCHLORIDE 60 MG/1
60 CAPSULE, DELAYED RELEASE ORAL DAILY
Status: DISCONTINUED | OUTPATIENT
Start: 2017-07-13 | End: 2017-07-14 | Stop reason: HOSPADM

## 2017-07-13 RX ORDER — ATORVASTATIN CALCIUM 40 MG/1
40 TABLET, FILM COATED ORAL
Status: DISCONTINUED | OUTPATIENT
Start: 2017-07-13 | End: 2017-07-14 | Stop reason: HOSPADM

## 2017-07-13 RX ORDER — NITROGLYCERIN 0.4 MG/1
0.4 TABLET SUBLINGUAL
Status: DISCONTINUED | OUTPATIENT
Start: 2017-07-13 | End: 2017-07-14 | Stop reason: HOSPADM

## 2017-07-13 RX ORDER — OXYCODONE HYDROCHLORIDE AND ACETAMINOPHEN 5; 325 MG/1; MG/1
1 TABLET ORAL EVERY 6 HOURS PRN
Status: DISCONTINUED | OUTPATIENT
Start: 2017-07-13 | End: 2017-07-13

## 2017-07-13 RX ORDER — ASPIRIN 81 MG/1
324 TABLET, CHEWABLE ORAL ONCE
Status: COMPLETED | OUTPATIENT
Start: 2017-07-13 | End: 2017-07-13

## 2017-07-13 RX ORDER — NITROGLYCERIN 0.4 MG/1
TABLET SUBLINGUAL CODE/TRAUMA/SEDATION MEDICATION
Status: COMPLETED | OUTPATIENT
Start: 2017-07-13 | End: 2017-07-13

## 2017-07-13 RX ORDER — MIDAZOLAM HYDROCHLORIDE 1 MG/ML
INJECTION INTRAMUSCULAR; INTRAVENOUS CODE/TRAUMA/SEDATION MEDICATION
Status: COMPLETED | OUTPATIENT
Start: 2017-07-13 | End: 2017-07-13

## 2017-07-13 RX ORDER — SODIUM CHLORIDE 9 MG/ML
100 INJECTION, SOLUTION INTRAVENOUS CONTINUOUS
Status: DISCONTINUED | OUTPATIENT
Start: 2017-07-13 | End: 2017-07-13

## 2017-07-13 RX ORDER — OXYCODONE HYDROCHLORIDE AND ACETAMINOPHEN 5; 325 MG/1; MG/1
2 TABLET ORAL EVERY 6 HOURS PRN
Status: DISCONTINUED | OUTPATIENT
Start: 2017-07-13 | End: 2017-07-13

## 2017-07-13 RX ORDER — LORAZEPAM 1 MG/1
1 TABLET ORAL EVERY 8 HOURS PRN
Status: DISCONTINUED | OUTPATIENT
Start: 2017-07-13 | End: 2017-07-14 | Stop reason: HOSPADM

## 2017-07-13 RX ORDER — CLOPIDOGREL BISULFATE 75 MG/1
75 TABLET ORAL DAILY
Status: DISCONTINUED | OUTPATIENT
Start: 2017-07-14 | End: 2017-07-14 | Stop reason: HOSPADM

## 2017-07-13 RX ORDER — OXYCODONE HYDROCHLORIDE AND ACETAMINOPHEN 5; 325 MG/1; MG/1
1 TABLET ORAL EVERY 6 HOURS PRN
Status: DISCONTINUED | OUTPATIENT
Start: 2017-07-13 | End: 2017-07-14 | Stop reason: HOSPADM

## 2017-07-13 RX ORDER — CLOPIDOGREL BISULFATE 75 MG/1
TABLET ORAL CODE/TRAUMA/SEDATION MEDICATION
Status: COMPLETED | OUTPATIENT
Start: 2017-07-13 | End: 2017-07-13

## 2017-07-13 RX ORDER — BUDESONIDE AND FORMOTEROL FUMARATE DIHYDRATE 160; 4.5 UG/1; UG/1
2 AEROSOL RESPIRATORY (INHALATION) 2 TIMES DAILY
Status: DISCONTINUED | OUTPATIENT
Start: 2017-07-13 | End: 2017-07-14 | Stop reason: HOSPADM

## 2017-07-13 RX ORDER — ZOLPIDEM TARTRATE 5 MG/1
5 TABLET ORAL
Status: DISCONTINUED | OUTPATIENT
Start: 2017-07-13 | End: 2017-07-14 | Stop reason: HOSPADM

## 2017-07-13 RX ORDER — ASPIRIN 81 MG/1
81 TABLET, CHEWABLE ORAL DAILY
Status: DISCONTINUED | OUTPATIENT
Start: 2017-07-14 | End: 2017-07-14 | Stop reason: HOSPADM

## 2017-07-13 RX ORDER — QUETIAPINE FUMARATE 25 MG/1
50 TABLET, FILM COATED ORAL
Status: DISCONTINUED | OUTPATIENT
Start: 2017-07-13 | End: 2017-07-14 | Stop reason: HOSPADM

## 2017-07-13 RX ORDER — ISOSORBIDE MONONITRATE 30 MG/1
30 TABLET, EXTENDED RELEASE ORAL DAILY
Status: DISCONTINUED | OUTPATIENT
Start: 2017-07-13 | End: 2017-07-14 | Stop reason: HOSPADM

## 2017-07-13 RX ADMIN — DESMOPRESSIN ACETATE 40 MG: 0.2 TABLET ORAL at 19:10

## 2017-07-13 RX ADMIN — ZOLPIDEM TARTRATE 5 MG: 5 TABLET ORAL at 21:50

## 2017-07-13 RX ADMIN — CLOPIDOGREL 75 MG: 75 TABLET, FILM COATED ORAL at 11:00

## 2017-07-13 RX ADMIN — QUETIAPINE FUMARATE 50 MG: 25 TABLET, FILM COATED ORAL at 21:50

## 2017-07-13 RX ADMIN — FENTANYL CITRATE 50 MCG: 50 INJECTION, SOLUTION INTRAMUSCULAR; INTRAVENOUS at 11:37

## 2017-07-13 RX ADMIN — OXYCODONE HYDROCHLORIDE AND ACETAMINOPHEN 2 TABLET: 5; 325 TABLET ORAL at 17:49

## 2017-07-13 RX ADMIN — FENTANYL CITRATE 25 MCG: 50 INJECTION, SOLUTION INTRAMUSCULAR; INTRAVENOUS at 12:04

## 2017-07-13 RX ADMIN — LORAZEPAM 1 MG: 1 TABLET ORAL at 14:16

## 2017-07-13 RX ADMIN — MIDAZOLAM 1 MG: 1 INJECTION INTRAMUSCULAR; INTRAVENOUS at 11:53

## 2017-07-13 RX ADMIN — MIDAZOLAM 2 MG: 1 INJECTION INTRAMUSCULAR; INTRAVENOUS at 11:37

## 2017-07-13 RX ADMIN — ASPIRIN 81 MG 324 MG: 81 TABLET ORAL at 08:26

## 2017-07-13 RX ADMIN — NITROGLYCERIN 0.4 MG: 0.4 TABLET SUBLINGUAL at 11:08

## 2017-07-13 RX ADMIN — NITROGLYCERIN 0.4 MG: 0.4 TABLET SUBLINGUAL at 11:03

## 2017-07-13 RX ADMIN — IOHEXOL 150 ML: 350 INJECTION, SOLUTION INTRAVENOUS at 12:12

## 2017-07-13 RX ADMIN — OXYCODONE HYDROCHLORIDE AND ACETAMINOPHEN 1 TABLET: 5; 325 TABLET ORAL at 23:24

## 2017-07-13 RX ADMIN — MIDAZOLAM 1 MG: 1 INJECTION INTRAMUSCULAR; INTRAVENOUS at 12:04

## 2017-07-13 RX ADMIN — FENTANYL CITRATE 25 MCG: 50 INJECTION, SOLUTION INTRAMUSCULAR; INTRAVENOUS at 11:53

## 2017-07-13 RX ADMIN — LIDOCAINE HYDROCHLORIDE 10 ML: 10 INJECTION, SOLUTION INFILTRATION; PERINEURAL at 11:42

## 2017-07-13 RX ADMIN — DULOXETINE HYDROCHLORIDE 60 MG: 60 CAPSULE, DELAYED RELEASE ORAL at 17:32

## 2017-07-13 RX ADMIN — BIVALIRUDIN 1.75 MG/KG/HR: 250 INJECTION, POWDER, LYOPHILIZED, FOR SOLUTION INTRAVENOUS at 11:51

## 2017-07-13 RX ADMIN — SODIUM CHLORIDE 100 ML/HR: 0.9 INJECTION, SOLUTION INTRAVENOUS at 08:29

## 2017-07-13 RX ADMIN — ISOSORBIDE MONONITRATE 30 MG: 30 TABLET, EXTENDED RELEASE ORAL at 17:32

## 2017-07-13 RX ADMIN — CLOPIDOGREL 225 MG: 75 TABLET, FILM COATED ORAL at 12:09

## 2017-07-13 RX ADMIN — BUDESONIDE AND FORMOTEROL FUMARATE DIHYDRATE 2 PUFF: 160; 4.5 AEROSOL RESPIRATORY (INHALATION) at 17:32

## 2017-07-14 VITALS
BODY MASS INDEX: 23.25 KG/M2 | DIASTOLIC BLOOD PRESSURE: 65 MMHG | HEIGHT: 75 IN | SYSTOLIC BLOOD PRESSURE: 129 MMHG | WEIGHT: 187 LBS | OXYGEN SATURATION: 97 % | RESPIRATION RATE: 18 BRPM | HEART RATE: 68 BPM | TEMPERATURE: 97.8 F

## 2017-07-14 LAB
ANION GAP SERPL CALCULATED.3IONS-SCNC: 5 MMOL/L (ref 4–13)
ATRIAL RATE: 74 BPM
BUN SERPL-MCNC: 13 MG/DL (ref 5–25)
CALCIUM SERPL-MCNC: 9.1 MG/DL (ref 8.3–10.1)
CHLORIDE SERPL-SCNC: 101 MMOL/L (ref 100–108)
CO2 SERPL-SCNC: 29 MMOL/L (ref 21–32)
CREAT SERPL-MCNC: 0.76 MG/DL (ref 0.6–1.3)
ERYTHROCYTE [DISTWIDTH] IN BLOOD BY AUTOMATED COUNT: 13.9 % (ref 11.6–15.1)
GFR SERPL CREATININE-BSD FRML MDRD: >60 ML/MIN/1.73SQ M
GLUCOSE SERPL-MCNC: 107 MG/DL (ref 65–140)
HCT VFR BLD AUTO: 33.1 % (ref 36.5–49.3)
HGB BLD-MCNC: 11.1 G/DL (ref 12–17)
MCH RBC QN AUTO: 32.6 PG (ref 26.8–34.3)
MCHC RBC AUTO-ENTMCNC: 33.5 G/DL (ref 31.4–37.4)
MCV RBC AUTO: 97 FL (ref 82–98)
PLATELET # BLD AUTO: 153 THOUSANDS/UL (ref 149–390)
PMV BLD AUTO: 9.1 FL (ref 8.9–12.7)
POTASSIUM SERPL-SCNC: 3.8 MMOL/L (ref 3.5–5.3)
QRS AXIS: 82 DEGREES
QRSD INTERVAL: 98 MS
QT INTERVAL: 456 MS
QTC INTERVAL: 427 MS
RBC # BLD AUTO: 3.41 MILLION/UL (ref 3.88–5.62)
SODIUM SERPL-SCNC: 135 MMOL/L (ref 136–145)
T WAVE AXIS: 61 DEGREES
VENTRICULAR RATE: 53 BPM
WBC # BLD AUTO: 4.72 THOUSAND/UL (ref 4.31–10.16)

## 2017-07-14 PROCEDURE — 80048 BASIC METABOLIC PNL TOTAL CA: CPT | Performed by: INTERNAL MEDICINE

## 2017-07-14 PROCEDURE — 85027 COMPLETE CBC AUTOMATED: CPT | Performed by: INTERNAL MEDICINE

## 2017-07-14 RX ORDER — OXYCODONE HYDROCHLORIDE AND ACETAMINOPHEN 5; 325 MG/1; MG/1
2 TABLET ORAL ONCE
Status: COMPLETED | OUTPATIENT
Start: 2017-07-14 | End: 2017-07-14

## 2017-07-14 RX ORDER — OXYCODONE HYDROCHLORIDE AND ACETAMINOPHEN 5; 325 MG/1; MG/1
1 TABLET ORAL ONCE
Status: COMPLETED | OUTPATIENT
Start: 2017-07-14 | End: 2017-07-14

## 2017-07-14 RX ADMIN — CLOPIDOGREL BISULFATE 75 MG: 75 TABLET ORAL at 08:51

## 2017-07-14 RX ADMIN — ASPIRIN 81 MG 81 MG: 81 TABLET ORAL at 08:51

## 2017-07-14 RX ADMIN — APIXABAN 5 MG: 5 TABLET, FILM COATED ORAL at 08:51

## 2017-07-14 RX ADMIN — BUDESONIDE AND FORMOTEROL FUMARATE DIHYDRATE 2 PUFF: 160; 4.5 AEROSOL RESPIRATORY (INHALATION) at 10:36

## 2017-07-14 RX ADMIN — LORAZEPAM 1 MG: 1 TABLET ORAL at 05:07

## 2017-07-14 RX ADMIN — OXYCODONE HYDROCHLORIDE AND ACETAMINOPHEN 1 TABLET: 5; 325 TABLET ORAL at 10:36

## 2017-07-14 RX ADMIN — OXYCODONE HYDROCHLORIDE AND ACETAMINOPHEN 1 TABLET: 5; 325 TABLET ORAL at 10:05

## 2017-07-14 RX ADMIN — ISOSORBIDE MONONITRATE 30 MG: 30 TABLET, EXTENDED RELEASE ORAL at 08:51

## 2017-07-14 RX ADMIN — DULOXETINE HYDROCHLORIDE 60 MG: 60 CAPSULE, DELAYED RELEASE ORAL at 10:36

## 2017-07-14 RX ADMIN — OXYCODONE HYDROCHLORIDE AND ACETAMINOPHEN 2 TABLET: 5; 325 TABLET ORAL at 03:51

## 2017-07-15 ENCOUNTER — GENERIC CONVERSION - ENCOUNTER (OUTPATIENT)
Dept: OTHER | Facility: OTHER | Age: 67
End: 2017-07-15

## 2017-07-15 ENCOUNTER — APPOINTMENT (EMERGENCY)
Dept: RADIOLOGY | Facility: HOSPITAL | Age: 67
End: 2017-07-15
Payer: MEDICARE

## 2017-07-15 ENCOUNTER — APPOINTMENT (OUTPATIENT)
Dept: CT IMAGING | Facility: HOSPITAL | Age: 67
End: 2017-07-15
Payer: MEDICARE

## 2017-07-15 ENCOUNTER — HOSPITAL ENCOUNTER (OUTPATIENT)
Facility: HOSPITAL | Age: 67
Setting detail: OBSERVATION
Discharge: HOME | End: 2017-07-16
Attending: EMERGENCY MEDICINE | Admitting: GENERAL PRACTICE
Payer: MEDICARE

## 2017-07-15 DIAGNOSIS — R07.9 CHEST PAIN: Primary | ICD-10-CM

## 2017-07-15 DIAGNOSIS — I25.110 CORONARY ARTERY DISEASE INVOLVING NATIVE CORONARY ARTERY OF NATIVE HEART WITH UNSTABLE ANGINA PECTORIS (HCC): ICD-10-CM

## 2017-07-15 DIAGNOSIS — I10 ESSENTIAL HYPERTENSION: ICD-10-CM

## 2017-07-15 DIAGNOSIS — I48.91 ATRIAL FIBRILLATION, UNSPECIFIED TYPE (HCC): ICD-10-CM

## 2017-07-15 PROBLEM — R10.84 GENERALIZED ABDOMINAL PAIN: Status: ACTIVE | Noted: 2017-07-15

## 2017-07-15 PROBLEM — I50.32 CHRONIC DIASTOLIC CONGESTIVE HEART FAILURE (HCC): Chronic | Status: ACTIVE | Noted: 2017-07-15

## 2017-07-15 LAB
ALBUMIN SERPL BCP-MCNC: 4.1 G/DL (ref 3.5–5)
ALP SERPL-CCNC: 107 U/L (ref 46–116)
ALT SERPL W P-5'-P-CCNC: 19 U/L (ref 12–78)
ANION GAP SERPL CALCULATED.3IONS-SCNC: 12 MMOL/L (ref 4–13)
APTT PPP: 38 SECONDS (ref 23–35)
AST SERPL W P-5'-P-CCNC: 21 U/L (ref 5–45)
BASOPHILS # BLD AUTO: 0.02 THOUSANDS/ΜL (ref 0–0.1)
BASOPHILS NFR BLD AUTO: 0 % (ref 0–1)
BILIRUB SERPL-MCNC: 1.3 MG/DL (ref 0.2–1)
BUN SERPL-MCNC: 13 MG/DL (ref 5–25)
CALCIUM SERPL-MCNC: 9.1 MG/DL (ref 8.3–10.1)
CHLORIDE SERPL-SCNC: 94 MMOL/L (ref 100–108)
CO2 SERPL-SCNC: 24 MMOL/L (ref 21–32)
CREAT SERPL-MCNC: 0.95 MG/DL (ref 0.6–1.3)
EOSINOPHIL # BLD AUTO: 0.04 THOUSAND/ΜL (ref 0–0.61)
EOSINOPHIL NFR BLD AUTO: 1 % (ref 0–6)
ERYTHROCYTE [DISTWIDTH] IN BLOOD BY AUTOMATED COUNT: 12.9 % (ref 11.6–15.1)
FOLATE SERPL-MCNC: >20 NG/ML (ref 3.1–17.5)
GFR SERPL CREATININE-BSD FRML MDRD: >60 ML/MIN/1.73SQ M
GLUCOSE SERPL-MCNC: 93 MG/DL (ref 65–140)
HCT VFR BLD AUTO: 31.5 % (ref 36.5–49.3)
HGB BLD-MCNC: 10.5 G/DL (ref 12–17)
INR PPP: 1.28 (ref 0.86–1.16)
LIPASE SERPL-CCNC: 82 U/L (ref 73–393)
LIPASE SERPL-CCNC: 82 U/L (ref 73–393)
LYMPHOCYTES # BLD AUTO: 0.86 THOUSANDS/ΜL (ref 0.6–4.47)
LYMPHOCYTES NFR BLD AUTO: 12 % (ref 14–44)
MAGNESIUM SERPL-MCNC: 1.8 MG/DL (ref 1.6–2.6)
MCH RBC QN AUTO: 33 PG (ref 26.8–34.3)
MCHC RBC AUTO-ENTMCNC: 33.3 G/DL (ref 31.4–37.4)
MCV RBC AUTO: 99 FL (ref 82–98)
MONOCYTES # BLD AUTO: 0.52 THOUSAND/ΜL (ref 0.17–1.22)
MONOCYTES NFR BLD AUTO: 7 % (ref 4–12)
NEUTROPHILS # BLD AUTO: 5.81 THOUSANDS/ΜL (ref 1.85–7.62)
NEUTS SEG NFR BLD AUTO: 80 % (ref 43–75)
NRBC BLD AUTO-RTO: 0 /100 WBCS
NT-PROBNP SERPL-MCNC: 836 PG/ML
PLATELET # BLD AUTO: 144 THOUSANDS/UL (ref 149–390)
PMV BLD AUTO: 9.1 FL (ref 8.9–12.7)
POTASSIUM SERPL-SCNC: 3.9 MMOL/L (ref 3.5–5.3)
PROT SERPL-MCNC: 7.6 G/DL (ref 6.4–8.2)
PROTHROMBIN TIME: 16.3 SECONDS (ref 12.1–14.4)
RBC # BLD AUTO: 3.18 MILLION/UL (ref 3.88–5.62)
SODIUM SERPL-SCNC: 130 MMOL/L (ref 136–145)
SPECIMEN SOURCE: NORMAL
TROPONIN I BLD-MCNC: 0 NG/ML (ref 0–0.08)
TROPONIN I SERPL-MCNC: <0.02 NG/ML
TSH SERPL DL<=0.05 MIU/L-ACNC: 0.8 UIU/ML (ref 0.36–3.74)
VIT B12 SERPL-MCNC: 618 PG/ML (ref 100–900)
WBC # BLD AUTO: 7.28 THOUSAND/UL (ref 4.31–10.16)

## 2017-07-15 PROCEDURE — 80053 COMPREHEN METABOLIC PANEL: CPT

## 2017-07-15 PROCEDURE — 83735 ASSAY OF MAGNESIUM: CPT | Performed by: PHYSICIAN ASSISTANT

## 2017-07-15 PROCEDURE — 84484 ASSAY OF TROPONIN QUANT: CPT

## 2017-07-15 PROCEDURE — 36415 COLL VENOUS BLD VENIPUNCTURE: CPT

## 2017-07-15 PROCEDURE — 82607 VITAMIN B-12: CPT | Performed by: PHYSICIAN ASSISTANT

## 2017-07-15 PROCEDURE — 85025 COMPLETE CBC W/AUTO DIFF WBC: CPT

## 2017-07-15 PROCEDURE — 74176 CT ABD & PELVIS W/O CONTRAST: CPT

## 2017-07-15 PROCEDURE — 83880 ASSAY OF NATRIURETIC PEPTIDE: CPT | Performed by: PHYSICIAN ASSISTANT

## 2017-07-15 PROCEDURE — 84443 ASSAY THYROID STIM HORMONE: CPT | Performed by: PHYSICIAN ASSISTANT

## 2017-07-15 PROCEDURE — 82746 ASSAY OF FOLIC ACID SERUM: CPT | Performed by: PHYSICIAN ASSISTANT

## 2017-07-15 PROCEDURE — 94664 DEMO&/EVAL PT USE INHALER: CPT

## 2017-07-15 PROCEDURE — 85610 PROTHROMBIN TIME: CPT | Performed by: PHYSICIAN ASSISTANT

## 2017-07-15 PROCEDURE — 85730 THROMBOPLASTIN TIME PARTIAL: CPT

## 2017-07-15 PROCEDURE — 93005 ELECTROCARDIOGRAM TRACING: CPT | Performed by: PHYSICIAN ASSISTANT

## 2017-07-15 PROCEDURE — 93005 ELECTROCARDIOGRAM TRACING: CPT

## 2017-07-15 PROCEDURE — 84484 ASSAY OF TROPONIN QUANT: CPT | Performed by: PHYSICIAN ASSISTANT

## 2017-07-15 PROCEDURE — 96374 THER/PROPH/DIAG INJ IV PUSH: CPT

## 2017-07-15 PROCEDURE — 99285 EMERGENCY DEPT VISIT HI MDM: CPT

## 2017-07-15 PROCEDURE — 83690 ASSAY OF LIPASE: CPT | Performed by: PHYSICIAN ASSISTANT

## 2017-07-15 PROCEDURE — 71020 HB CHEST X-RAY 2VW FRONTAL&LATL: CPT

## 2017-07-15 RX ORDER — MORPHINE SULFATE 4 MG/ML
4 INJECTION, SOLUTION INTRAMUSCULAR; INTRAVENOUS ONCE
Status: COMPLETED | OUTPATIENT
Start: 2017-07-15 | End: 2017-07-15

## 2017-07-15 RX ORDER — NITROGLYCERIN 0.4 MG/1
0.4 TABLET SUBLINGUAL
Status: DISCONTINUED | OUTPATIENT
Start: 2017-07-15 | End: 2017-07-16 | Stop reason: HOSPADM

## 2017-07-15 RX ORDER — DULOXETIN HYDROCHLORIDE 60 MG/1
60 CAPSULE, DELAYED RELEASE ORAL DAILY
Status: DISCONTINUED | OUTPATIENT
Start: 2017-07-16 | End: 2017-07-16 | Stop reason: HOSPADM

## 2017-07-15 RX ORDER — ONDANSETRON 2 MG/ML
4 INJECTION INTRAMUSCULAR; INTRAVENOUS ONCE
Status: COMPLETED | OUTPATIENT
Start: 2017-07-15 | End: 2017-07-15

## 2017-07-15 RX ORDER — ACETAMINOPHEN 325 MG/1
650 TABLET ORAL EVERY 4 HOURS PRN
Status: DISCONTINUED | OUTPATIENT
Start: 2017-07-15 | End: 2017-07-16 | Stop reason: HOSPADM

## 2017-07-15 RX ORDER — CLOPIDOGREL BISULFATE 75 MG/1
75 TABLET ORAL DAILY
Status: DISCONTINUED | OUTPATIENT
Start: 2017-07-16 | End: 2017-07-16 | Stop reason: HOSPADM

## 2017-07-15 RX ORDER — LORAZEPAM 1 MG/1
1 TABLET ORAL EVERY 8 HOURS PRN
Status: DISCONTINUED | OUTPATIENT
Start: 2017-07-15 | End: 2017-07-16 | Stop reason: HOSPADM

## 2017-07-15 RX ORDER — SPIRONOLACTONE 25 MG/1
50 TABLET ORAL DAILY
Status: DISCONTINUED | OUTPATIENT
Start: 2017-07-16 | End: 2017-07-15

## 2017-07-15 RX ORDER — SPIRONOLACTONE 25 MG/1
50 TABLET ORAL 2 TIMES DAILY
Status: DISCONTINUED | OUTPATIENT
Start: 2017-07-15 | End: 2017-07-16 | Stop reason: HOSPADM

## 2017-07-15 RX ORDER — TORSEMIDE 20 MG/1
40 TABLET ORAL 2 TIMES DAILY
Status: DISCONTINUED | OUTPATIENT
Start: 2017-07-15 | End: 2017-07-16 | Stop reason: HOSPADM

## 2017-07-15 RX ORDER — ONDANSETRON 2 MG/ML
4 INJECTION INTRAMUSCULAR; INTRAVENOUS EVERY 6 HOURS PRN
Status: DISCONTINUED | OUTPATIENT
Start: 2017-07-15 | End: 2017-07-16 | Stop reason: HOSPADM

## 2017-07-15 RX ORDER — SIMETHICONE 80 MG
80 TABLET,CHEWABLE ORAL EVERY 6 HOURS PRN
Status: DISCONTINUED | OUTPATIENT
Start: 2017-07-15 | End: 2017-07-16 | Stop reason: HOSPADM

## 2017-07-15 RX ORDER — LORAZEPAM 1 MG/1
1 TABLET ORAL ONCE
Status: DISCONTINUED | OUTPATIENT
Start: 2017-07-15 | End: 2017-07-15

## 2017-07-15 RX ORDER — ZOLPIDEM TARTRATE 5 MG/1
5 TABLET ORAL
Status: DISCONTINUED | OUTPATIENT
Start: 2017-07-15 | End: 2017-07-16 | Stop reason: HOSPADM

## 2017-07-15 RX ORDER — ASPIRIN 81 MG/1
81 TABLET, CHEWABLE ORAL DAILY
Status: DISCONTINUED | OUTPATIENT
Start: 2017-07-16 | End: 2017-07-16 | Stop reason: HOSPADM

## 2017-07-15 RX ORDER — QUETIAPINE FUMARATE 25 MG/1
50 TABLET, FILM COATED ORAL
Status: DISCONTINUED | OUTPATIENT
Start: 2017-07-15 | End: 2017-07-16 | Stop reason: HOSPADM

## 2017-07-15 RX ORDER — BUDESONIDE AND FORMOTEROL FUMARATE DIHYDRATE 160; 4.5 UG/1; UG/1
2 AEROSOL RESPIRATORY (INHALATION) 2 TIMES DAILY
Status: DISCONTINUED | OUTPATIENT
Start: 2017-07-15 | End: 2017-07-16 | Stop reason: HOSPADM

## 2017-07-15 RX ORDER — CALCIUM CARBONATE 200(500)MG
1000 TABLET,CHEWABLE ORAL DAILY PRN
Status: DISCONTINUED | OUTPATIENT
Start: 2017-07-15 | End: 2017-07-16 | Stop reason: HOSPADM

## 2017-07-15 RX ORDER — LORAZEPAM 2 MG/ML
2 INJECTION INTRAMUSCULAR ONCE
Status: COMPLETED | OUTPATIENT
Start: 2017-07-15 | End: 2017-07-15

## 2017-07-15 RX ORDER — OXYCODONE HYDROCHLORIDE AND ACETAMINOPHEN 5; 325 MG/1; MG/1
2 TABLET ORAL EVERY 4 HOURS PRN
Status: DISCONTINUED | OUTPATIENT
Start: 2017-07-15 | End: 2017-07-16 | Stop reason: HOSPADM

## 2017-07-15 RX ORDER — HEPARIN SODIUM 5000 [USP'U]/ML
5000 INJECTION, SOLUTION INTRAVENOUS; SUBCUTANEOUS EVERY 8 HOURS SCHEDULED
Status: DISCONTINUED | OUTPATIENT
Start: 2017-07-15 | End: 2017-07-16

## 2017-07-15 RX ORDER — ATORVASTATIN CALCIUM 40 MG/1
40 TABLET, FILM COATED ORAL DAILY
Status: DISCONTINUED | OUTPATIENT
Start: 2017-07-16 | End: 2017-07-16 | Stop reason: HOSPADM

## 2017-07-15 RX ORDER — ISOSORBIDE MONONITRATE 30 MG/1
30 TABLET, EXTENDED RELEASE ORAL DAILY
Status: DISCONTINUED | OUTPATIENT
Start: 2017-07-16 | End: 2017-07-16 | Stop reason: HOSPADM

## 2017-07-15 RX ADMIN — SPIRONOLACTONE 50 MG: 25 TABLET, FILM COATED ORAL at 18:59

## 2017-07-15 RX ADMIN — Medication 80 MG: at 20:23

## 2017-07-15 RX ADMIN — ZOLPIDEM TARTRATE 5 MG: 5 TABLET, COATED ORAL at 22:37

## 2017-07-15 RX ADMIN — ONDANSETRON 4 MG: 2 INJECTION INTRAMUSCULAR; INTRAVENOUS at 22:04

## 2017-07-15 RX ADMIN — HEPARIN SODIUM 5000 UNITS: 5000 INJECTION, SOLUTION INTRAVENOUS; SUBCUTANEOUS at 18:57

## 2017-07-15 RX ADMIN — LORAZEPAM 2 MG: 2 INJECTION INTRAMUSCULAR; INTRAVENOUS at 17:17

## 2017-07-15 RX ADMIN — TORSEMIDE 40 MG: 20 TABLET ORAL at 18:59

## 2017-07-15 RX ADMIN — OXYCODONE HYDROCHLORIDE AND ACETAMINOPHEN 2 TABLET: 5; 325 TABLET ORAL at 18:58

## 2017-07-15 RX ADMIN — MORPHINE SULFATE 4 MG: 4 INJECTION, SOLUTION INTRAMUSCULAR; INTRAVENOUS at 15:38

## 2017-07-15 RX ADMIN — ONDANSETRON 4 MG: 2 INJECTION INTRAMUSCULAR; INTRAVENOUS at 18:58

## 2017-07-15 RX ADMIN — QUETIAPINE FUMARATE 50 MG: 25 TABLET, FILM COATED ORAL at 21:34

## 2017-07-15 RX ADMIN — BUDESONIDE AND FORMOTEROL FUMARATE DIHYDRATE 2 PUFF: 160; 4.5 AEROSOL RESPIRATORY (INHALATION) at 18:57

## 2017-07-16 VITALS
HEIGHT: 74 IN | OXYGEN SATURATION: 93 % | RESPIRATION RATE: 20 BRPM | SYSTOLIC BLOOD PRESSURE: 96 MMHG | DIASTOLIC BLOOD PRESSURE: 54 MMHG | BODY MASS INDEX: 24.19 KG/M2 | WEIGHT: 188.49 LBS | TEMPERATURE: 98.4 F | HEART RATE: 54 BPM

## 2017-07-16 PROBLEM — R07.9 CHEST PAIN: Status: RESOLVED | Noted: 2017-07-15 | Resolved: 2017-07-16

## 2017-07-16 PROBLEM — R10.84 GENERALIZED ABDOMINAL PAIN: Status: RESOLVED | Noted: 2017-07-15 | Resolved: 2017-07-16

## 2017-07-16 LAB
ANION GAP SERPL CALCULATED.3IONS-SCNC: 7 MMOL/L (ref 4–13)
BUN SERPL-MCNC: 15 MG/DL (ref 5–25)
CALCIUM SERPL-MCNC: 8.6 MG/DL (ref 8.3–10.1)
CHLORIDE SERPL-SCNC: 97 MMOL/L (ref 100–108)
CHOLEST SERPL-MCNC: 128 MG/DL (ref 50–200)
CO2 SERPL-SCNC: 29 MMOL/L (ref 21–32)
CREAT SERPL-MCNC: 1.07 MG/DL (ref 0.6–1.3)
ERYTHROCYTE [DISTWIDTH] IN BLOOD BY AUTOMATED COUNT: 13.2 % (ref 11.6–15.1)
GFR SERPL CREATININE-BSD FRML MDRD: >60 ML/MIN/1.73SQ M
GLUCOSE P FAST SERPL-MCNC: 102 MG/DL (ref 65–99)
GLUCOSE SERPL-MCNC: 102 MG/DL (ref 65–140)
HCT VFR BLD AUTO: 33.8 % (ref 36.5–49.3)
HDLC SERPL-MCNC: 58 MG/DL (ref 40–60)
HGB BLD-MCNC: 11.3 G/DL (ref 12–17)
LDLC SERPL CALC-MCNC: 64 MG/DL (ref 0–100)
MCH RBC QN AUTO: 32.7 PG (ref 26.8–34.3)
MCHC RBC AUTO-ENTMCNC: 33.4 G/DL (ref 31.4–37.4)
MCV RBC AUTO: 98 FL (ref 82–98)
PLATELET # BLD AUTO: 134 THOUSANDS/UL (ref 149–390)
PMV BLD AUTO: 9.1 FL (ref 8.9–12.7)
POTASSIUM SERPL-SCNC: 3.8 MMOL/L (ref 3.5–5.3)
RBC # BLD AUTO: 3.46 MILLION/UL (ref 3.88–5.62)
SODIUM SERPL-SCNC: 133 MMOL/L (ref 136–145)
TRIGL SERPL-MCNC: 30 MG/DL
WBC # BLD AUTO: 5.15 THOUSAND/UL (ref 4.31–10.16)

## 2017-07-16 PROCEDURE — 80061 LIPID PANEL: CPT | Performed by: PHYSICIAN ASSISTANT

## 2017-07-16 PROCEDURE — 80048 BASIC METABOLIC PNL TOTAL CA: CPT | Performed by: PHYSICIAN ASSISTANT

## 2017-07-16 PROCEDURE — 85027 COMPLETE CBC AUTOMATED: CPT | Performed by: PHYSICIAN ASSISTANT

## 2017-07-16 RX ORDER — SIMETHICONE 80 MG
80 TABLET,CHEWABLE ORAL EVERY 6 HOURS PRN
Qty: 30 TABLET | Refills: 0
Start: 2017-07-16 | End: 2018-07-11

## 2017-07-16 RX ORDER — LORAZEPAM 1 MG/1
1 TABLET ORAL ONCE
Status: COMPLETED | OUTPATIENT
Start: 2017-07-16 | End: 2017-07-16

## 2017-07-16 RX ORDER — SIMETHICONE 80 MG
80 TABLET,CHEWABLE ORAL ONCE
Status: COMPLETED | OUTPATIENT
Start: 2017-07-16 | End: 2017-07-16

## 2017-07-16 RX ADMIN — LORAZEPAM 1 MG: 1 TABLET ORAL at 10:42

## 2017-07-16 RX ADMIN — ATORVASTATIN CALCIUM 40 MG: 40 TABLET, FILM COATED ORAL at 08:39

## 2017-07-16 RX ADMIN — TORSEMIDE 40 MG: 20 TABLET ORAL at 08:39

## 2017-07-16 RX ADMIN — SPIRONOLACTONE 50 MG: 25 TABLET, FILM COATED ORAL at 08:39

## 2017-07-16 RX ADMIN — CLOPIDOGREL BISULFATE 75 MG: 75 TABLET ORAL at 08:39

## 2017-07-16 RX ADMIN — ASPIRIN 81 MG 81 MG: 81 TABLET ORAL at 08:39

## 2017-07-16 RX ADMIN — NITROGLYCERIN 0.5 INCH: 20 OINTMENT TOPICAL at 08:39

## 2017-07-16 RX ADMIN — OXYCODONE HYDROCHLORIDE AND ACETAMINOPHEN 2 TABLET: 5; 325 TABLET ORAL at 08:39

## 2017-07-16 RX ADMIN — ISOSORBIDE MONONITRATE 30 MG: 30 TABLET, EXTENDED RELEASE ORAL at 08:39

## 2017-07-16 RX ADMIN — LORAZEPAM 1 MG: 1 TABLET ORAL at 06:28

## 2017-07-16 RX ADMIN — BUDESONIDE AND FORMOTEROL FUMARATE DIHYDRATE 2 PUFF: 160; 4.5 AEROSOL RESPIRATORY (INHALATION) at 08:40

## 2017-07-16 RX ADMIN — OXYCODONE HYDROCHLORIDE AND ACETAMINOPHEN 2 TABLET: 5; 325 TABLET ORAL at 03:50

## 2017-07-16 RX ADMIN — DULOXETINE 60 MG: 60 CAPSULE, DELAYED RELEASE ORAL at 08:40

## 2017-07-16 RX ADMIN — Medication 80 MG: at 10:42

## 2017-07-17 ENCOUNTER — ALLSCRIPTS OFFICE VISIT (OUTPATIENT)
Dept: OTHER | Facility: OTHER | Age: 67
End: 2017-07-17

## 2017-07-17 LAB
ATRIAL RATE: 170 BPM
ATRIAL RATE: 416 BPM
ATRIAL RATE: 97 BPM
QRS AXIS: 78 DEGREES
QRS AXIS: 80 DEGREES
QRS AXIS: 81 DEGREES
QRSD INTERVAL: 102 MS
QRSD INTERVAL: 104 MS
QRSD INTERVAL: 104 MS
QT INTERVAL: 422 MS
QT INTERVAL: 422 MS
QT INTERVAL: 430 MS
QTC INTERVAL: 460 MS
QTC INTERVAL: 462 MS
QTC INTERVAL: 510 MS
T WAVE AXIS: -28 DEGREES
T WAVE AXIS: -69 DEGREES
T WAVE AXIS: 267 DEGREES
VENTRICULAR RATE: 69 BPM
VENTRICULAR RATE: 72 BPM
VENTRICULAR RATE: 88 BPM

## 2017-07-18 ENCOUNTER — APPOINTMENT (EMERGENCY)
Dept: CT IMAGING | Facility: HOSPITAL | Age: 67
End: 2017-07-18
Payer: MEDICARE

## 2017-07-18 ENCOUNTER — HOSPITAL ENCOUNTER (EMERGENCY)
Facility: HOSPITAL | Age: 67
Discharge: HOME | End: 2017-07-19
Payer: MEDICARE

## 2017-07-18 ENCOUNTER — ALLSCRIPTS OFFICE VISIT (OUTPATIENT)
Dept: OTHER | Facility: OTHER | Age: 67
End: 2017-07-18

## 2017-07-18 ENCOUNTER — APPOINTMENT (EMERGENCY)
Dept: RADIOLOGY | Facility: HOSPITAL | Age: 67
End: 2017-07-18
Payer: MEDICARE

## 2017-07-18 VITALS
DIASTOLIC BLOOD PRESSURE: 63 MMHG | OXYGEN SATURATION: 100 % | SYSTOLIC BLOOD PRESSURE: 115 MMHG | RESPIRATION RATE: 17 BRPM | TEMPERATURE: 97.8 F | HEART RATE: 59 BPM

## 2017-07-18 DIAGNOSIS — R06.02 SHORTNESS OF BREATH: ICD-10-CM

## 2017-07-18 DIAGNOSIS — F41.9 ANXIETY: Primary | ICD-10-CM

## 2017-07-18 DIAGNOSIS — R10.9 ABDOMINAL PAIN: ICD-10-CM

## 2017-07-18 LAB
ALBUMIN SERPL BCP-MCNC: 4.8 G/DL (ref 3.5–5)
ALP SERPL-CCNC: 133 U/L (ref 46–116)
ALT SERPL W P-5'-P-CCNC: 27 U/L (ref 12–78)
ANION GAP SERPL CALCULATED.3IONS-SCNC: 10 MMOL/L (ref 4–13)
AST SERPL W P-5'-P-CCNC: 35 U/L (ref 5–45)
ATRIAL RATE: 60 BPM
BASOPHILS # BLD AUTO: 0.03 THOUSANDS/ΜL (ref 0–0.1)
BASOPHILS NFR BLD AUTO: 1 % (ref 0–1)
BILIRUB SERPL-MCNC: 1.1 MG/DL (ref 0.2–1)
BUN SERPL-MCNC: 17 MG/DL (ref 5–25)
CALCIUM SERPL-MCNC: 9.7 MG/DL (ref 8.3–10.1)
CHLORIDE SERPL-SCNC: 92 MMOL/L (ref 100–108)
CO2 SERPL-SCNC: 30 MMOL/L (ref 21–32)
CREAT SERPL-MCNC: 0.91 MG/DL (ref 0.6–1.3)
EOSINOPHIL # BLD AUTO: 0.15 THOUSAND/ΜL (ref 0–0.61)
EOSINOPHIL NFR BLD AUTO: 3 % (ref 0–6)
ERYTHROCYTE [DISTWIDTH] IN BLOOD BY AUTOMATED COUNT: 13.1 % (ref 11.6–15.1)
GFR SERPL CREATININE-BSD FRML MDRD: >60 ML/MIN/1.73SQ M
GLUCOSE SERPL-MCNC: 99 MG/DL (ref 65–140)
HCT VFR BLD AUTO: 37.5 % (ref 36.5–49.3)
HGB BLD-MCNC: 12.8 G/DL (ref 12–17)
LIPASE SERPL-CCNC: 113 U/L (ref 73–393)
LYMPHOCYTES # BLD AUTO: 0.99 THOUSANDS/ΜL (ref 0.6–4.47)
LYMPHOCYTES NFR BLD AUTO: 19 % (ref 14–44)
MCH RBC QN AUTO: 33.2 PG (ref 26.8–34.3)
MCHC RBC AUTO-ENTMCNC: 34.1 G/DL (ref 31.4–37.4)
MCV RBC AUTO: 97 FL (ref 82–98)
MONOCYTES # BLD AUTO: 0.49 THOUSAND/ΜL (ref 0.17–1.22)
MONOCYTES NFR BLD AUTO: 9 % (ref 4–12)
NEUTROPHILS # BLD AUTO: 3.68 THOUSANDS/ΜL (ref 1.85–7.62)
NEUTS SEG NFR BLD AUTO: 69 % (ref 43–75)
NRBC BLD AUTO-RTO: 0 /100 WBCS
NT-PROBNP SERPL-MCNC: 948 PG/ML
PLATELET # BLD AUTO: 181 THOUSANDS/UL (ref 149–390)
PMV BLD AUTO: 9.8 FL (ref 8.9–12.7)
POTASSIUM SERPL-SCNC: 4.1 MMOL/L (ref 3.5–5.3)
PROT SERPL-MCNC: 8.7 G/DL (ref 6.4–8.2)
QRS AXIS: 80 DEGREES
QRSD INTERVAL: 106 MS
QT INTERVAL: 444 MS
QTC INTERVAL: 444 MS
RBC # BLD AUTO: 3.86 MILLION/UL (ref 3.88–5.62)
SODIUM SERPL-SCNC: 132 MMOL/L (ref 136–145)
T WAVE AXIS: 2 DEGREES
TROPONIN I SERPL-MCNC: <0.02 NG/ML
VENTRICULAR RATE: 60 BPM
WBC # BLD AUTO: 5.36 THOUSAND/UL (ref 4.31–10.16)

## 2017-07-18 PROCEDURE — 93005 ELECTROCARDIOGRAM TRACING: CPT | Performed by: PHYSICIAN ASSISTANT

## 2017-07-18 PROCEDURE — 96375 TX/PRO/DX INJ NEW DRUG ADDON: CPT

## 2017-07-18 PROCEDURE — 96374 THER/PROPH/DIAG INJ IV PUSH: CPT

## 2017-07-18 PROCEDURE — 96376 TX/PRO/DX INJ SAME DRUG ADON: CPT

## 2017-07-18 PROCEDURE — 83880 ASSAY OF NATRIURETIC PEPTIDE: CPT | Performed by: PHYSICIAN ASSISTANT

## 2017-07-18 PROCEDURE — 80053 COMPREHEN METABOLIC PANEL: CPT | Performed by: PHYSICIAN ASSISTANT

## 2017-07-18 PROCEDURE — 84484 ASSAY OF TROPONIN QUANT: CPT | Performed by: PHYSICIAN ASSISTANT

## 2017-07-18 PROCEDURE — 71010 HB CHEST X-RAY 1 VIEW FRONTAL (PORTABLE): CPT

## 2017-07-18 PROCEDURE — 36415 COLL VENOUS BLD VENIPUNCTURE: CPT | Performed by: PHYSICIAN ASSISTANT

## 2017-07-18 PROCEDURE — 83690 ASSAY OF LIPASE: CPT | Performed by: PHYSICIAN ASSISTANT

## 2017-07-18 PROCEDURE — 74177 CT ABD & PELVIS W/CONTRAST: CPT

## 2017-07-18 PROCEDURE — 71275 CT ANGIOGRAPHY CHEST: CPT

## 2017-07-18 PROCEDURE — 85025 COMPLETE CBC W/AUTO DIFF WBC: CPT | Performed by: PHYSICIAN ASSISTANT

## 2017-07-18 RX ORDER — IPRATROPIUM BROMIDE AND ALBUTEROL SULFATE 2.5; .5 MG/3ML; MG/3ML
3 SOLUTION RESPIRATORY (INHALATION) ONCE
Status: COMPLETED | OUTPATIENT
Start: 2017-07-18 | End: 2017-07-18

## 2017-07-18 RX ORDER — SODIUM PHOSPHATE, DIBASIC AND SODIUM PHOSPHATE, MONOBASIC 7; 19 G/133ML; G/133ML
1 ENEMA RECTAL ONCE
Status: COMPLETED | OUTPATIENT
Start: 2017-07-18 | End: 2017-07-18

## 2017-07-18 RX ORDER — LORAZEPAM 2 MG/ML
1 INJECTION INTRAMUSCULAR ONCE
Status: COMPLETED | OUTPATIENT
Start: 2017-07-18 | End: 2017-07-18

## 2017-07-18 RX ORDER — HYDROXYZINE HYDROCHLORIDE 10 MG/1
10 TABLET, FILM COATED ORAL EVERY 6 HOURS PRN
Qty: 30 TABLET | Refills: 0 | Status: SHIPPED | OUTPATIENT
Start: 2017-07-18 | End: 2017-08-18 | Stop reason: ALTCHOICE

## 2017-07-18 RX ORDER — ZOLPIDEM TARTRATE 5 MG/1
10 TABLET ORAL ONCE
Status: COMPLETED | OUTPATIENT
Start: 2017-07-18 | End: 2017-07-18

## 2017-07-18 RX ORDER — MORPHINE SULFATE 4 MG/ML
4 INJECTION, SOLUTION INTRAMUSCULAR; INTRAVENOUS ONCE
Status: COMPLETED | OUTPATIENT
Start: 2017-07-18 | End: 2017-07-18

## 2017-07-18 RX ADMIN — MORPHINE SULFATE 4 MG: 4 INJECTION, SOLUTION INTRAMUSCULAR; INTRAVENOUS at 23:49

## 2017-07-18 RX ADMIN — LORAZEPAM 1 MG: 2 INJECTION INTRAMUSCULAR; INTRAVENOUS at 20:56

## 2017-07-18 RX ADMIN — IPRATROPIUM BROMIDE AND ALBUTEROL SULFATE 3 ML: .5; 3 SOLUTION RESPIRATORY (INHALATION) at 21:13

## 2017-07-18 RX ADMIN — ZOLPIDEM TARTRATE 10 MG: 5 TABLET, FILM COATED ORAL at 23:49

## 2017-07-18 RX ADMIN — IOHEXOL 100 ML: 350 INJECTION, SOLUTION INTRAVENOUS at 18:17

## 2017-07-18 RX ADMIN — SODIUM PHOSPHATE, DIBASIC AND SODIUM PHOSPHATE, MONOBASIC 1 ENEMA: 7; 19 ENEMA RECTAL at 19:46

## 2017-07-18 RX ADMIN — MORPHINE SULFATE 4 MG: 4 INJECTION, SOLUTION INTRAMUSCULAR; INTRAVENOUS at 18:03

## 2017-07-19 PROCEDURE — 99285 EMERGENCY DEPT VISIT HI MDM: CPT

## 2017-07-21 ENCOUNTER — ALLSCRIPTS OFFICE VISIT (OUTPATIENT)
Dept: OTHER | Facility: OTHER | Age: 67
End: 2017-07-21

## 2017-07-23 LAB
ATRIAL RATE: 60 BPM
QRS AXIS: 85 DEGREES
QRSD INTERVAL: 104 MS
QT INTERVAL: 464 MS
QTC INTERVAL: 440 MS
T WAVE AXIS: 31 DEGREES
VENTRICULAR RATE: 54 BPM

## 2017-08-17 ENCOUNTER — GENERIC CONVERSION - ENCOUNTER (OUTPATIENT)
Dept: OTHER | Facility: OTHER | Age: 67
End: 2017-08-17

## 2017-08-18 ENCOUNTER — APPOINTMENT (EMERGENCY)
Dept: RADIOLOGY | Facility: HOSPITAL | Age: 67
DRG: 313 | End: 2017-08-18
Payer: MEDICARE

## 2017-08-18 ENCOUNTER — HOSPITAL ENCOUNTER (INPATIENT)
Facility: HOSPITAL | Age: 67
LOS: 3 days | Discharge: HOME HEALTH | DRG: 313 | End: 2017-08-22
Attending: EMERGENCY MEDICINE | Admitting: FAMILY MEDICINE
Payer: MEDICARE

## 2017-08-18 DIAGNOSIS — R07.9 CHEST PAIN: Primary | ICD-10-CM

## 2017-08-18 DIAGNOSIS — I25.110 CORONARY ARTERY DISEASE INVOLVING NATIVE CORONARY ARTERY OF NATIVE HEART WITH UNSTABLE ANGINA PECTORIS (HCC): ICD-10-CM

## 2017-08-18 PROBLEM — I50.32 CHRONIC DIASTOLIC CONGESTIVE HEART FAILURE (HCC): Chronic | Status: RESOLVED | Noted: 2017-07-15 | Resolved: 2017-08-18

## 2017-08-18 LAB
ANION GAP SERPL CALCULATED.3IONS-SCNC: 11 MMOL/L (ref 4–13)
BASOPHILS # BLD AUTO: 0.03 THOUSANDS/ΜL (ref 0–0.1)
BASOPHILS NFR BLD AUTO: 1 % (ref 0–1)
BUN SERPL-MCNC: 14 MG/DL (ref 5–25)
CALCIUM SERPL-MCNC: 9.5 MG/DL (ref 8.3–10.1)
CHLORIDE SERPL-SCNC: 103 MMOL/L (ref 100–108)
CO2 SERPL-SCNC: 26 MMOL/L (ref 21–32)
CREAT SERPL-MCNC: 0.7 MG/DL (ref 0.6–1.3)
EOSINOPHIL # BLD AUTO: 0.06 THOUSAND/ΜL (ref 0–0.61)
EOSINOPHIL NFR BLD AUTO: 2 % (ref 0–6)
ERYTHROCYTE [DISTWIDTH] IN BLOOD BY AUTOMATED COUNT: 13.9 % (ref 11.6–15.1)
GFR SERPL CREATININE-BSD FRML MDRD: 97 ML/MIN/1.73SQ M
GLUCOSE SERPL-MCNC: 83 MG/DL (ref 65–140)
HCT VFR BLD AUTO: 36.2 % (ref 36.5–49.3)
HGB BLD-MCNC: 11.5 G/DL (ref 12–17)
LYMPHOCYTES # BLD AUTO: 0.65 THOUSANDS/ΜL (ref 0.6–4.47)
LYMPHOCYTES NFR BLD AUTO: 18 % (ref 14–44)
MCH RBC QN AUTO: 32.4 PG (ref 26.8–34.3)
MCHC RBC AUTO-ENTMCNC: 31.8 G/DL (ref 31.4–37.4)
MCV RBC AUTO: 102 FL (ref 82–98)
MONOCYTES # BLD AUTO: 0.36 THOUSAND/ΜL (ref 0.17–1.22)
MONOCYTES NFR BLD AUTO: 10 % (ref 4–12)
NEUTROPHILS # BLD AUTO: 2.58 THOUSANDS/ΜL (ref 1.85–7.62)
NEUTS SEG NFR BLD AUTO: 70 % (ref 43–75)
NRBC BLD AUTO-RTO: 0 /100 WBCS
NT-PROBNP SERPL-MCNC: 888 PG/ML
PLATELET # BLD AUTO: 143 THOUSANDS/UL (ref 149–390)
PLATELET # BLD AUTO: 146 THOUSANDS/UL (ref 149–390)
PMV BLD AUTO: 9.5 FL (ref 8.9–12.7)
PMV BLD AUTO: 9.8 FL (ref 8.9–12.7)
POTASSIUM SERPL-SCNC: 4.4 MMOL/L (ref 3.5–5.3)
RBC # BLD AUTO: 3.55 MILLION/UL (ref 3.88–5.62)
SODIUM SERPL-SCNC: 140 MMOL/L (ref 136–145)
TROPONIN I SERPL-MCNC: 0.02 NG/ML
TROPONIN I SERPL-MCNC: <0.02 NG/ML
TSH SERPL DL<=0.05 MIU/L-ACNC: 0.39 UIU/ML (ref 0.36–3.74)
WBC # BLD AUTO: 3.69 THOUSAND/UL (ref 4.31–10.16)

## 2017-08-18 PROCEDURE — 93005 ELECTROCARDIOGRAM TRACING: CPT | Performed by: EMERGENCY MEDICINE

## 2017-08-18 PROCEDURE — 93005 ELECTROCARDIOGRAM TRACING: CPT | Performed by: FAMILY MEDICINE

## 2017-08-18 PROCEDURE — 84484 ASSAY OF TROPONIN QUANT: CPT | Performed by: FAMILY MEDICINE

## 2017-08-18 PROCEDURE — 85025 COMPLETE CBC W/AUTO DIFF WBC: CPT | Performed by: EMERGENCY MEDICINE

## 2017-08-18 PROCEDURE — 71020 HB CHEST X-RAY 2VW FRONTAL&LATL: CPT

## 2017-08-18 PROCEDURE — 36415 COLL VENOUS BLD VENIPUNCTURE: CPT | Performed by: EMERGENCY MEDICINE

## 2017-08-18 PROCEDURE — 83880 ASSAY OF NATRIURETIC PEPTIDE: CPT | Performed by: FAMILY MEDICINE

## 2017-08-18 PROCEDURE — 80048 BASIC METABOLIC PNL TOTAL CA: CPT | Performed by: EMERGENCY MEDICINE

## 2017-08-18 PROCEDURE — 99285 EMERGENCY DEPT VISIT HI MDM: CPT

## 2017-08-18 PROCEDURE — 84443 ASSAY THYROID STIM HORMONE: CPT | Performed by: FAMILY MEDICINE

## 2017-08-18 PROCEDURE — 85049 AUTOMATED PLATELET COUNT: CPT | Performed by: FAMILY MEDICINE

## 2017-08-18 PROCEDURE — 84484 ASSAY OF TROPONIN QUANT: CPT | Performed by: EMERGENCY MEDICINE

## 2017-08-18 RX ORDER — OXYCODONE HYDROCHLORIDE AND ACETAMINOPHEN 5; 325 MG/1; MG/1
1 TABLET ORAL EVERY 4 HOURS PRN
Status: DISCONTINUED | OUTPATIENT
Start: 2017-08-18 | End: 2017-08-18

## 2017-08-18 RX ORDER — ZOLPIDEM TARTRATE 5 MG/1
5 TABLET ORAL
Status: DISCONTINUED | OUTPATIENT
Start: 2017-08-18 | End: 2017-08-22 | Stop reason: HOSPADM

## 2017-08-18 RX ORDER — ISOSORBIDE MONONITRATE 30 MG/1
30 TABLET, EXTENDED RELEASE ORAL DAILY
Status: DISCONTINUED | OUTPATIENT
Start: 2017-08-18 | End: 2017-08-22 | Stop reason: HOSPADM

## 2017-08-18 RX ORDER — LIDOCAINE 50 MG/G
1 PATCH TOPICAL DAILY
Status: DISCONTINUED | OUTPATIENT
Start: 2017-08-18 | End: 2017-08-22 | Stop reason: HOSPADM

## 2017-08-18 RX ORDER — ASPIRIN 325 MG
325 TABLET ORAL ONCE
Status: DISCONTINUED | OUTPATIENT
Start: 2017-08-18 | End: 2017-08-18

## 2017-08-18 RX ORDER — LORAZEPAM 1 MG/1
2 TABLET ORAL EVERY 8 HOURS PRN
Status: DISCONTINUED | OUTPATIENT
Start: 2017-08-18 | End: 2017-08-22 | Stop reason: HOSPADM

## 2017-08-18 RX ORDER — SIMETHICONE 80 MG
80 TABLET,CHEWABLE ORAL EVERY 6 HOURS PRN
Status: DISCONTINUED | OUTPATIENT
Start: 2017-08-18 | End: 2017-08-22 | Stop reason: HOSPADM

## 2017-08-18 RX ORDER — OXYCODONE HYDROCHLORIDE AND ACETAMINOPHEN 5; 325 MG/1; MG/1
2 TABLET ORAL EVERY 4 HOURS PRN
Status: DISCONTINUED | OUTPATIENT
Start: 2017-08-18 | End: 2017-08-19

## 2017-08-18 RX ORDER — ATORVASTATIN CALCIUM 40 MG/1
40 TABLET, FILM COATED ORAL
Status: DISCONTINUED | OUTPATIENT
Start: 2017-08-18 | End: 2017-08-22 | Stop reason: HOSPADM

## 2017-08-18 RX ORDER — ASPIRIN 81 MG/1
81 TABLET, CHEWABLE ORAL DAILY
Status: DISCONTINUED | OUTPATIENT
Start: 2017-08-18 | End: 2017-08-22 | Stop reason: HOSPADM

## 2017-08-18 RX ORDER — DULOXETIN HYDROCHLORIDE 60 MG/1
60 CAPSULE, DELAYED RELEASE ORAL DAILY
Status: DISCONTINUED | OUTPATIENT
Start: 2017-08-19 | End: 2017-08-22 | Stop reason: HOSPADM

## 2017-08-18 RX ORDER — BUDESONIDE AND FORMOTEROL FUMARATE DIHYDRATE 160; 4.5 UG/1; UG/1
2 AEROSOL RESPIRATORY (INHALATION) 2 TIMES DAILY
Status: DISCONTINUED | OUTPATIENT
Start: 2017-08-18 | End: 2017-08-22 | Stop reason: HOSPADM

## 2017-08-18 RX ORDER — TORSEMIDE 20 MG/1
40 TABLET ORAL 2 TIMES DAILY
Status: DISCONTINUED | OUTPATIENT
Start: 2017-08-18 | End: 2017-08-22 | Stop reason: HOSPADM

## 2017-08-18 RX ORDER — ONDANSETRON 2 MG/ML
4 INJECTION INTRAMUSCULAR; INTRAVENOUS EVERY 6 HOURS PRN
Status: DISCONTINUED | OUTPATIENT
Start: 2017-08-18 | End: 2017-08-22 | Stop reason: HOSPADM

## 2017-08-18 RX ORDER — ACETAMINOPHEN 325 MG/1
650 TABLET ORAL EVERY 6 HOURS PRN
Status: DISCONTINUED | OUTPATIENT
Start: 2017-08-18 | End: 2017-08-18

## 2017-08-18 RX ORDER — FENTANYL CITRATE 50 UG/ML
25 INJECTION, SOLUTION INTRAMUSCULAR; INTRAVENOUS ONCE
Status: COMPLETED | OUTPATIENT
Start: 2017-08-18 | End: 2017-08-18

## 2017-08-18 RX ORDER — QUETIAPINE FUMARATE 50 MG/1
50 TABLET, FILM COATED ORAL
Status: ON HOLD | COMMUNITY
Start: 2014-09-04 | End: 2018-11-06

## 2017-08-18 RX ORDER — DOCUSATE SODIUM 100 MG/1
100 CAPSULE, LIQUID FILLED ORAL 2 TIMES DAILY
Status: DISCONTINUED | OUTPATIENT
Start: 2017-08-18 | End: 2017-08-22 | Stop reason: HOSPADM

## 2017-08-18 RX ORDER — NITROGLYCERIN 0.4 MG/1
0.4 TABLET SUBLINGUAL
Status: DISCONTINUED | OUTPATIENT
Start: 2017-08-18 | End: 2017-08-22 | Stop reason: HOSPADM

## 2017-08-18 RX ORDER — CLOPIDOGREL BISULFATE 75 MG/1
75 TABLET ORAL DAILY
Status: DISCONTINUED | OUTPATIENT
Start: 2017-08-18 | End: 2017-08-22 | Stop reason: HOSPADM

## 2017-08-18 RX ORDER — ACETAMINOPHEN 325 MG/1
325 TABLET ORAL EVERY 6 HOURS PRN
Status: DISCONTINUED | OUTPATIENT
Start: 2017-08-18 | End: 2017-08-19

## 2017-08-18 RX ORDER — QUETIAPINE FUMARATE 25 MG/1
50 TABLET, FILM COATED ORAL
Status: DISCONTINUED | OUTPATIENT
Start: 2017-08-18 | End: 2017-08-22 | Stop reason: HOSPADM

## 2017-08-18 RX ADMIN — FENTANYL CITRATE 25 MCG: 50 INJECTION INTRAMUSCULAR; INTRAVENOUS at 12:28

## 2017-08-18 RX ADMIN — LORAZEPAM 2 MG: 1 TABLET ORAL at 22:01

## 2017-08-18 RX ADMIN — ASPIRIN 81 MG 81 MG: 81 TABLET ORAL at 14:15

## 2017-08-18 RX ADMIN — CLOPIDOGREL BISULFATE 75 MG: 75 TABLET ORAL at 14:15

## 2017-08-18 RX ADMIN — TORSEMIDE 40 MG: 20 TABLET ORAL at 18:04

## 2017-08-18 RX ADMIN — LORAZEPAM 2 MG: 1 TABLET ORAL at 13:30

## 2017-08-18 RX ADMIN — APIXABAN 5 MG: 5 TABLET, FILM COATED ORAL at 18:04

## 2017-08-18 RX ADMIN — ZOLPIDEM TARTRATE 5 MG: 5 TABLET, COATED ORAL at 22:48

## 2017-08-18 RX ADMIN — OXYCODONE HYDROCHLORIDE AND ACETAMINOPHEN 2 TABLET: 5; 325 TABLET ORAL at 20:43

## 2017-08-18 RX ADMIN — DOCUSATE SODIUM 100 MG: 100 CAPSULE, LIQUID FILLED ORAL at 18:04

## 2017-08-18 RX ADMIN — ATORVASTATIN CALCIUM 40 MG: 40 TABLET, FILM COATED ORAL at 16:45

## 2017-08-18 RX ADMIN — ISOSORBIDE MONONITRATE 30 MG: 30 TABLET, EXTENDED RELEASE ORAL at 16:45

## 2017-08-18 RX ADMIN — OXYCODONE HYDROCHLORIDE AND ACETAMINOPHEN 1 TABLET: 5; 325 TABLET ORAL at 14:19

## 2017-08-18 RX ADMIN — QUETIAPINE FUMARATE 50 MG: 25 TABLET, FILM COATED ORAL at 22:01

## 2017-08-18 RX ADMIN — BUDESONIDE AND FORMOTEROL FUMARATE DIHYDRATE 2 PUFF: 160; 4.5 AEROSOL RESPIRATORY (INHALATION) at 18:04

## 2017-08-18 RX ADMIN — OXYCODONE HYDROCHLORIDE AND ACETAMINOPHEN 1 TABLET: 5; 325 TABLET ORAL at 16:45

## 2017-08-18 RX ADMIN — LIDOCAINE 1 PATCH: 50 PATCH CUTANEOUS at 14:15

## 2017-08-19 LAB
AMPHETAMINES SERPL QL SCN: NEGATIVE
BARBITURATES UR QL: NEGATIVE
BENZODIAZ UR QL: NEGATIVE
COCAINE UR QL: NEGATIVE
METHADONE UR QL: NEGATIVE
OPIATES UR QL SCN: POSITIVE
PCP UR QL: NEGATIVE
THC UR QL: NEGATIVE

## 2017-08-19 PROCEDURE — 80307 DRUG TEST PRSMV CHEM ANLYZR: CPT | Performed by: FAMILY MEDICINE

## 2017-08-19 RX ORDER — HYDROXYZINE HYDROCHLORIDE 25 MG/1
50 TABLET, FILM COATED ORAL EVERY 6 HOURS PRN
Status: DISCONTINUED | OUTPATIENT
Start: 2017-08-19 | End: 2017-08-22 | Stop reason: HOSPADM

## 2017-08-19 RX ORDER — OXYCODONE HYDROCHLORIDE 10 MG/1
10 TABLET ORAL EVERY 4 HOURS PRN
Status: DISCONTINUED | OUTPATIENT
Start: 2017-08-19 | End: 2017-08-22 | Stop reason: HOSPADM

## 2017-08-19 RX ORDER — ACETAMINOPHEN 325 MG/1
650 TABLET ORAL EVERY 6 HOURS PRN
Status: DISCONTINUED | OUTPATIENT
Start: 2017-08-19 | End: 2017-08-22 | Stop reason: HOSPADM

## 2017-08-19 RX ADMIN — LIDOCAINE 1 PATCH: 50 PATCH CUTANEOUS at 09:43

## 2017-08-19 RX ADMIN — ZOLPIDEM TARTRATE 5 MG: 5 TABLET, COATED ORAL at 23:33

## 2017-08-19 RX ADMIN — LORAZEPAM 2 MG: 1 TABLET ORAL at 06:32

## 2017-08-19 RX ADMIN — ASPIRIN 81 MG 81 MG: 81 TABLET ORAL at 09:44

## 2017-08-19 RX ADMIN — BUDESONIDE AND FORMOTEROL FUMARATE DIHYDRATE 2 PUFF: 160; 4.5 AEROSOL RESPIRATORY (INHALATION) at 09:44

## 2017-08-19 RX ADMIN — LORAZEPAM 2 MG: 1 TABLET ORAL at 15:48

## 2017-08-19 RX ADMIN — APIXABAN 5 MG: 5 TABLET, FILM COATED ORAL at 09:44

## 2017-08-19 RX ADMIN — DOCUSATE SODIUM 100 MG: 100 CAPSULE, LIQUID FILLED ORAL at 09:44

## 2017-08-19 RX ADMIN — ATORVASTATIN CALCIUM 40 MG: 40 TABLET, FILM COATED ORAL at 15:48

## 2017-08-19 RX ADMIN — TORSEMIDE 40 MG: 20 TABLET ORAL at 09:41

## 2017-08-19 RX ADMIN — OXYCODONE HYDROCHLORIDE AND ACETAMINOPHEN 2 TABLET: 5; 325 TABLET ORAL at 02:59

## 2017-08-19 RX ADMIN — QUETIAPINE FUMARATE 50 MG: 25 TABLET, FILM COATED ORAL at 21:23

## 2017-08-19 RX ADMIN — DOCUSATE SODIUM 100 MG: 100 CAPSULE, LIQUID FILLED ORAL at 17:16

## 2017-08-19 RX ADMIN — DULOXETINE HYDROCHLORIDE 60 MG: 60 CAPSULE, DELAYED RELEASE ORAL at 09:43

## 2017-08-19 RX ADMIN — ISOSORBIDE MONONITRATE 30 MG: 30 TABLET, EXTENDED RELEASE ORAL at 09:41

## 2017-08-19 RX ADMIN — TORSEMIDE 40 MG: 20 TABLET ORAL at 17:13

## 2017-08-19 RX ADMIN — BUDESONIDE AND FORMOTEROL FUMARATE DIHYDRATE 2 PUFF: 160; 4.5 AEROSOL RESPIRATORY (INHALATION) at 17:17

## 2017-08-19 RX ADMIN — APIXABAN 5 MG: 5 TABLET, FILM COATED ORAL at 17:16

## 2017-08-19 RX ADMIN — OXYCODONE HYDROCHLORIDE AND ACETAMINOPHEN 2 TABLET: 5; 325 TABLET ORAL at 09:40

## 2017-08-19 RX ADMIN — HYDROXYZINE HYDROCHLORIDE 50 MG: 25 TABLET, FILM COATED ORAL at 12:34

## 2017-08-19 RX ADMIN — CLOPIDOGREL BISULFATE 75 MG: 75 TABLET ORAL at 09:44

## 2017-08-19 RX ADMIN — OXYCODONE HYDROCHLORIDE 10 MG: 10 TABLET ORAL at 15:54

## 2017-08-19 RX ADMIN — OXYCODONE HYDROCHLORIDE 10 MG: 10 TABLET ORAL at 21:23

## 2017-08-20 LAB
ALBUMIN SERPL BCP-MCNC: 3.5 G/DL (ref 3.5–5)
ALP SERPL-CCNC: 112 U/L (ref 46–116)
ALT SERPL W P-5'-P-CCNC: 20 U/L (ref 12–78)
ANION GAP SERPL CALCULATED.3IONS-SCNC: 8 MMOL/L (ref 4–13)
AST SERPL W P-5'-P-CCNC: 18 U/L (ref 5–45)
BILIRUB SERPL-MCNC: 0.6 MG/DL (ref 0.2–1)
BUN SERPL-MCNC: 14 MG/DL (ref 5–25)
CALCIUM SERPL-MCNC: 8.7 MG/DL (ref 8.3–10.1)
CHLORIDE SERPL-SCNC: 101 MMOL/L (ref 100–108)
CO2 SERPL-SCNC: 31 MMOL/L (ref 21–32)
CREAT SERPL-MCNC: 0.97 MG/DL (ref 0.6–1.3)
ERYTHROCYTE [DISTWIDTH] IN BLOOD BY AUTOMATED COUNT: 13.8 % (ref 11.6–15.1)
GFR SERPL CREATININE-BSD FRML MDRD: 80 ML/MIN/1.73SQ M
GLUCOSE SERPL-MCNC: 89 MG/DL (ref 65–140)
HCT VFR BLD AUTO: 33.7 % (ref 36.5–49.3)
HGB BLD-MCNC: 11.2 G/DL (ref 12–17)
MAGNESIUM SERPL-MCNC: 2 MG/DL (ref 1.6–2.6)
MCH RBC QN AUTO: 33.1 PG (ref 26.8–34.3)
MCHC RBC AUTO-ENTMCNC: 33.2 G/DL (ref 31.4–37.4)
MCV RBC AUTO: 100 FL (ref 82–98)
PLATELET # BLD AUTO: 155 THOUSANDS/UL (ref 149–390)
PMV BLD AUTO: 9.6 FL (ref 8.9–12.7)
POTASSIUM SERPL-SCNC: 3.3 MMOL/L (ref 3.5–5.3)
PROT SERPL-MCNC: 6.9 G/DL (ref 6.4–8.2)
RBC # BLD AUTO: 3.38 MILLION/UL (ref 3.88–5.62)
SODIUM SERPL-SCNC: 140 MMOL/L (ref 136–145)
WBC # BLD AUTO: 4.55 THOUSAND/UL (ref 4.31–10.16)

## 2017-08-20 PROCEDURE — 83735 ASSAY OF MAGNESIUM: CPT | Performed by: FAMILY MEDICINE

## 2017-08-20 PROCEDURE — 85027 COMPLETE CBC AUTOMATED: CPT | Performed by: FAMILY MEDICINE

## 2017-08-20 PROCEDURE — 80053 COMPREHEN METABOLIC PANEL: CPT | Performed by: FAMILY MEDICINE

## 2017-08-20 RX ORDER — POTASSIUM CHLORIDE 20 MEQ/1
20 TABLET, EXTENDED RELEASE ORAL DAILY
Status: DISCONTINUED | OUTPATIENT
Start: 2017-08-20 | End: 2017-08-22 | Stop reason: HOSPADM

## 2017-08-20 RX ADMIN — BUDESONIDE AND FORMOTEROL FUMARATE DIHYDRATE 2 PUFF: 160; 4.5 AEROSOL RESPIRATORY (INHALATION) at 09:57

## 2017-08-20 RX ADMIN — ZOLPIDEM TARTRATE 5 MG: 5 TABLET, COATED ORAL at 22:34

## 2017-08-20 RX ADMIN — TORSEMIDE 40 MG: 20 TABLET ORAL at 10:00

## 2017-08-20 RX ADMIN — ACETAMINOPHEN 650 MG: 325 TABLET ORAL at 10:09

## 2017-08-20 RX ADMIN — APIXABAN 5 MG: 5 TABLET, FILM COATED ORAL at 15:54

## 2017-08-20 RX ADMIN — DOCUSATE SODIUM 100 MG: 100 CAPSULE, LIQUID FILLED ORAL at 15:53

## 2017-08-20 RX ADMIN — LORAZEPAM 2 MG: 1 TABLET ORAL at 10:42

## 2017-08-20 RX ADMIN — ASPIRIN 81 MG 81 MG: 81 TABLET ORAL at 10:00

## 2017-08-20 RX ADMIN — DOCUSATE SODIUM 100 MG: 100 CAPSULE, LIQUID FILLED ORAL at 10:00

## 2017-08-20 RX ADMIN — BUDESONIDE AND FORMOTEROL FUMARATE DIHYDRATE 2 PUFF: 160; 4.5 AEROSOL RESPIRATORY (INHALATION) at 15:54

## 2017-08-20 RX ADMIN — QUETIAPINE FUMARATE 50 MG: 25 TABLET, FILM COATED ORAL at 22:35

## 2017-08-20 RX ADMIN — LIDOCAINE 1 PATCH: 50 PATCH CUTANEOUS at 09:58

## 2017-08-20 RX ADMIN — TORSEMIDE 40 MG: 20 TABLET ORAL at 15:53

## 2017-08-20 RX ADMIN — DULOXETINE HYDROCHLORIDE 60 MG: 60 CAPSULE, DELAYED RELEASE ORAL at 09:59

## 2017-08-20 RX ADMIN — ISOSORBIDE MONONITRATE 30 MG: 30 TABLET, EXTENDED RELEASE ORAL at 10:00

## 2017-08-20 RX ADMIN — CLOPIDOGREL BISULFATE 75 MG: 75 TABLET ORAL at 10:00

## 2017-08-20 RX ADMIN — OXYCODONE HYDROCHLORIDE 10 MG: 10 TABLET ORAL at 05:32

## 2017-08-20 RX ADMIN — ACETAMINOPHEN 650 MG: 325 TABLET ORAL at 22:37

## 2017-08-20 RX ADMIN — ATORVASTATIN CALCIUM 40 MG: 40 TABLET, FILM COATED ORAL at 15:54

## 2017-08-20 RX ADMIN — ACETAMINOPHEN 650 MG: 325 TABLET ORAL at 16:10

## 2017-08-20 RX ADMIN — OXYCODONE HYDROCHLORIDE 10 MG: 10 TABLET ORAL at 10:09

## 2017-08-20 RX ADMIN — POTASSIUM CHLORIDE 20 MEQ: 1500 TABLET, EXTENDED RELEASE ORAL at 16:53

## 2017-08-20 RX ADMIN — LORAZEPAM 2 MG: 1 TABLET ORAL at 19:17

## 2017-08-20 RX ADMIN — OXYCODONE HYDROCHLORIDE 10 MG: 10 TABLET ORAL at 16:10

## 2017-08-20 RX ADMIN — LORAZEPAM 2 MG: 1 TABLET ORAL at 02:16

## 2017-08-20 RX ADMIN — APIXABAN 5 MG: 5 TABLET, FILM COATED ORAL at 10:00

## 2017-08-21 LAB
ANION GAP SERPL CALCULATED.3IONS-SCNC: 5 MMOL/L (ref 4–13)
ATRIAL RATE: 111 BPM
ATRIAL RATE: 202 BPM
BUN SERPL-MCNC: 13 MG/DL (ref 5–25)
CALCIUM SERPL-MCNC: 8.6 MG/DL (ref 8.3–10.1)
CHLORIDE SERPL-SCNC: 101 MMOL/L (ref 100–108)
CO2 SERPL-SCNC: 32 MMOL/L (ref 21–32)
CREAT SERPL-MCNC: 0.97 MG/DL (ref 0.6–1.3)
ERYTHROCYTE [DISTWIDTH] IN BLOOD BY AUTOMATED COUNT: 13.6 % (ref 11.6–15.1)
GFR SERPL CREATININE-BSD FRML MDRD: 80 ML/MIN/1.73SQ M
GLUCOSE SERPL-MCNC: 93 MG/DL (ref 65–140)
HCT VFR BLD AUTO: 33.7 % (ref 36.5–49.3)
HGB BLD-MCNC: 10.9 G/DL (ref 12–17)
MCH RBC QN AUTO: 32.4 PG (ref 26.8–34.3)
MCHC RBC AUTO-ENTMCNC: 32.3 G/DL (ref 31.4–37.4)
MCV RBC AUTO: 100 FL (ref 82–98)
PLATELET # BLD AUTO: 147 THOUSANDS/UL (ref 149–390)
PMV BLD AUTO: 9.6 FL (ref 8.9–12.7)
POTASSIUM SERPL-SCNC: 3.8 MMOL/L (ref 3.5–5.3)
QRS AXIS: 81 DEGREES
QRS AXIS: 84 DEGREES
QRSD INTERVAL: 102 MS
QRSD INTERVAL: 98 MS
QT INTERVAL: 456 MS
QT INTERVAL: 476 MS
QTC INTERVAL: 394 MS
QTC INTERVAL: 451 MS
RBC # BLD AUTO: 3.36 MILLION/UL (ref 3.88–5.62)
SODIUM SERPL-SCNC: 138 MMOL/L (ref 136–145)
T WAVE AXIS: -56 DEGREES
T WAVE AXIS: 4 DEGREES
VENTRICULAR RATE: 45 BPM
VENTRICULAR RATE: 54 BPM
WBC # BLD AUTO: 3.9 THOUSAND/UL (ref 4.31–10.16)

## 2017-08-21 PROCEDURE — 85027 COMPLETE CBC AUTOMATED: CPT | Performed by: FAMILY MEDICINE

## 2017-08-21 PROCEDURE — 80048 BASIC METABOLIC PNL TOTAL CA: CPT | Performed by: FAMILY MEDICINE

## 2017-08-21 RX ORDER — POLYETHYLENE GLYCOL 3350 17 G/17G
17 POWDER, FOR SOLUTION ORAL DAILY
Status: DISCONTINUED | OUTPATIENT
Start: 2017-08-21 | End: 2017-08-22 | Stop reason: HOSPADM

## 2017-08-21 RX ADMIN — ACETAMINOPHEN 650 MG: 325 TABLET ORAL at 22:24

## 2017-08-21 RX ADMIN — LORAZEPAM 2 MG: 1 TABLET ORAL at 19:24

## 2017-08-21 RX ADMIN — ZOLPIDEM TARTRATE 5 MG: 5 TABLET, COATED ORAL at 22:24

## 2017-08-21 RX ADMIN — DULOXETINE HYDROCHLORIDE 60 MG: 60 CAPSULE, DELAYED RELEASE ORAL at 08:04

## 2017-08-21 RX ADMIN — DOCUSATE SODIUM 100 MG: 100 CAPSULE, LIQUID FILLED ORAL at 08:04

## 2017-08-21 RX ADMIN — ACETAMINOPHEN 650 MG: 325 TABLET ORAL at 08:05

## 2017-08-21 RX ADMIN — BUDESONIDE AND FORMOTEROL FUMARATE DIHYDRATE 2 PUFF: 160; 4.5 AEROSOL RESPIRATORY (INHALATION) at 17:50

## 2017-08-21 RX ADMIN — OXYCODONE HYDROCHLORIDE 10 MG: 10 TABLET ORAL at 22:24

## 2017-08-21 RX ADMIN — LORAZEPAM 2 MG: 1 TABLET ORAL at 11:19

## 2017-08-21 RX ADMIN — ASPIRIN 81 MG 81 MG: 81 TABLET ORAL at 08:06

## 2017-08-21 RX ADMIN — TORSEMIDE 40 MG: 20 TABLET ORAL at 08:06

## 2017-08-21 RX ADMIN — APIXABAN 5 MG: 5 TABLET, FILM COATED ORAL at 08:05

## 2017-08-21 RX ADMIN — OXYCODONE HYDROCHLORIDE 10 MG: 10 TABLET ORAL at 16:00

## 2017-08-21 RX ADMIN — QUETIAPINE FUMARATE 50 MG: 25 TABLET, FILM COATED ORAL at 22:24

## 2017-08-21 RX ADMIN — ACETAMINOPHEN 650 MG: 325 TABLET ORAL at 15:59

## 2017-08-21 RX ADMIN — APIXABAN 5 MG: 5 TABLET, FILM COATED ORAL at 17:50

## 2017-08-21 RX ADMIN — ATORVASTATIN CALCIUM 40 MG: 40 TABLET, FILM COATED ORAL at 16:00

## 2017-08-21 RX ADMIN — BUDESONIDE AND FORMOTEROL FUMARATE DIHYDRATE 2 PUFF: 160; 4.5 AEROSOL RESPIRATORY (INHALATION) at 08:07

## 2017-08-21 RX ADMIN — LORAZEPAM 2 MG: 1 TABLET ORAL at 03:22

## 2017-08-21 RX ADMIN — CLOPIDOGREL BISULFATE 75 MG: 75 TABLET ORAL at 08:06

## 2017-08-21 RX ADMIN — DOCUSATE SODIUM 100 MG: 100 CAPSULE, LIQUID FILLED ORAL at 17:50

## 2017-08-21 RX ADMIN — POLYETHYLENE GLYCOL 3350 17 G: 17 POWDER, FOR SOLUTION ORAL at 10:24

## 2017-08-21 RX ADMIN — OXYCODONE HYDROCHLORIDE 10 MG: 10 TABLET ORAL at 08:06

## 2017-08-21 RX ADMIN — TORSEMIDE 40 MG: 20 TABLET ORAL at 17:50

## 2017-08-21 RX ADMIN — LIDOCAINE 1 PATCH: 50 PATCH CUTANEOUS at 08:07

## 2017-08-21 RX ADMIN — ISOSORBIDE MONONITRATE 30 MG: 30 TABLET, EXTENDED RELEASE ORAL at 08:05

## 2017-08-21 RX ADMIN — OXYCODONE HYDROCHLORIDE 10 MG: 10 TABLET ORAL at 03:09

## 2017-08-21 RX ADMIN — POTASSIUM CHLORIDE 20 MEQ: 1500 TABLET, EXTENDED RELEASE ORAL at 08:05

## 2017-08-22 ENCOUNTER — APPOINTMENT (INPATIENT)
Dept: NUCLEAR MEDICINE | Facility: HOSPITAL | Age: 67
DRG: 313 | End: 2017-08-22
Payer: MEDICARE

## 2017-08-22 ENCOUNTER — APPOINTMENT (INPATIENT)
Dept: NON INVASIVE DIAGNOSTICS | Facility: HOSPITAL | Age: 67
DRG: 313 | End: 2017-08-22
Payer: MEDICARE

## 2017-08-22 VITALS
HEIGHT: 74 IN | HEART RATE: 52 BPM | SYSTOLIC BLOOD PRESSURE: 123 MMHG | RESPIRATION RATE: 18 BRPM | WEIGHT: 196.21 LBS | DIASTOLIC BLOOD PRESSURE: 59 MMHG | OXYGEN SATURATION: 93 % | TEMPERATURE: 97.9 F | BODY MASS INDEX: 25.18 KG/M2

## 2017-08-22 PROBLEM — R07.9 CHEST PAIN: Status: RESOLVED | Noted: 2017-08-18 | Resolved: 2017-08-22

## 2017-08-22 LAB
ATRIAL RATE: 300 BPM
MAX DIASTOLIC BP: 82 MMHG
MAX HEART RATE: 94 BPM
MAX PREDICTED HEART RATE: 152 BPM
MAX. SYSTOLIC BP: 128 MMHG
PROTOCOL NAME: NORMAL
QRS AXIS: 76 DEGREES
QRSD INTERVAL: 102 MS
QT INTERVAL: 416 MS
QTC INTERVAL: 408 MS
REASON FOR TERMINATION: NORMAL
T WAVE AXIS: -81 DEGREES
TARGET HR FORMULA: NORMAL
TEST INDICATION: NORMAL
TIME IN EXERCISE PHASE: 180 S
VENTRICULAR RATE: 58 BPM

## 2017-08-22 PROCEDURE — A9502 TC99M TETROFOSMIN: HCPCS

## 2017-08-22 PROCEDURE — 78452 HT MUSCLE IMAGE SPECT MULT: CPT

## 2017-08-22 PROCEDURE — 93017 CV STRESS TEST TRACING ONLY: CPT

## 2017-08-22 RX ADMIN — ACETAMINOPHEN 650 MG: 325 TABLET ORAL at 04:46

## 2017-08-22 RX ADMIN — APIXABAN 5 MG: 5 TABLET, FILM COATED ORAL at 08:35

## 2017-08-22 RX ADMIN — OXYCODONE HYDROCHLORIDE 10 MG: 10 TABLET ORAL at 11:10

## 2017-08-22 RX ADMIN — CLOPIDOGREL BISULFATE 75 MG: 75 TABLET ORAL at 08:34

## 2017-08-22 RX ADMIN — TORSEMIDE 40 MG: 20 TABLET ORAL at 08:34

## 2017-08-22 RX ADMIN — REGADENOSON 0.4 MG: 0.08 INJECTION, SOLUTION INTRAVENOUS at 09:55

## 2017-08-22 RX ADMIN — DOCUSATE SODIUM 100 MG: 100 CAPSULE, LIQUID FILLED ORAL at 08:35

## 2017-08-22 RX ADMIN — LORAZEPAM 2 MG: 1 TABLET ORAL at 04:34

## 2017-08-22 RX ADMIN — BUDESONIDE AND FORMOTEROL FUMARATE DIHYDRATE 2 PUFF: 160; 4.5 AEROSOL RESPIRATORY (INHALATION) at 08:36

## 2017-08-22 RX ADMIN — DULOXETINE HYDROCHLORIDE 60 MG: 60 CAPSULE, DELAYED RELEASE ORAL at 08:35

## 2017-08-22 RX ADMIN — ACETAMINOPHEN 650 MG: 325 TABLET ORAL at 11:11

## 2017-08-22 RX ADMIN — POTASSIUM CHLORIDE 20 MEQ: 1500 TABLET, EXTENDED RELEASE ORAL at 08:35

## 2017-08-22 RX ADMIN — ASPIRIN 81 MG 81 MG: 81 TABLET ORAL at 08:35

## 2017-08-22 RX ADMIN — LORAZEPAM 2 MG: 1 TABLET ORAL at 12:31

## 2017-08-22 RX ADMIN — LIDOCAINE 1 PATCH: 50 PATCH CUTANEOUS at 08:34

## 2017-08-22 RX ADMIN — POLYETHYLENE GLYCOL 3350 17 G: 17 POWDER, FOR SOLUTION ORAL at 08:34

## 2017-08-22 RX ADMIN — ISOSORBIDE MONONITRATE 30 MG: 30 TABLET, EXTENDED RELEASE ORAL at 08:35

## 2017-08-22 RX ADMIN — OXYCODONE HYDROCHLORIDE 10 MG: 10 TABLET ORAL at 04:47

## 2017-08-25 ENCOUNTER — ALLSCRIPTS OFFICE VISIT (OUTPATIENT)
Dept: OTHER | Facility: OTHER | Age: 67
End: 2017-08-25

## 2017-08-27 ENCOUNTER — GENERIC CONVERSION - ENCOUNTER (OUTPATIENT)
Dept: OTHER | Facility: OTHER | Age: 67
End: 2017-08-27

## 2017-08-27 ENCOUNTER — HOSPITAL ENCOUNTER (EMERGENCY)
Facility: HOSPITAL | Age: 67
Discharge: HOME | End: 2017-08-27
Attending: EMERGENCY MEDICINE
Payer: MEDICARE

## 2017-08-27 ENCOUNTER — DOCUMENTATION (OUTPATIENT)
Dept: CASE MANAGEMENT | Facility: HOSPITAL | Age: 67
End: 2017-08-27

## 2017-08-27 VITALS
TEMPERATURE: 98 F | DIASTOLIC BLOOD PRESSURE: 83 MMHG | BODY MASS INDEX: 26.4 KG/M2 | WEIGHT: 194.89 LBS | HEART RATE: 74 BPM | SYSTOLIC BLOOD PRESSURE: 160 MMHG | RESPIRATION RATE: 18 BRPM | OXYGEN SATURATION: 99 % | HEIGHT: 72 IN

## 2017-08-27 DIAGNOSIS — M54.31 CHRONIC SCIATICA OF RIGHT SIDE: Primary | ICD-10-CM

## 2017-08-27 LAB
ALBUMIN SERPL BCP-MCNC: 3.7 G/DL (ref 3.5–5)
ALP SERPL-CCNC: 116 U/L (ref 46–116)
ALT SERPL W P-5'-P-CCNC: 23 U/L (ref 12–78)
ANION GAP SERPL CALCULATED.3IONS-SCNC: 7 MMOL/L (ref 4–13)
AST SERPL W P-5'-P-CCNC: 17 U/L (ref 5–45)
BASOPHILS # BLD AUTO: 0.05 THOUSANDS/ΜL (ref 0–0.1)
BASOPHILS NFR BLD AUTO: 1 % (ref 0–1)
BILIRUB SERPL-MCNC: 0.7 MG/DL (ref 0.2–1)
BUN SERPL-MCNC: 7 MG/DL (ref 5–25)
CALCIUM SERPL-MCNC: 9.3 MG/DL (ref 8.3–10.1)
CHLORIDE SERPL-SCNC: 104 MMOL/L (ref 100–108)
CO2 SERPL-SCNC: 28 MMOL/L (ref 21–32)
CREAT SERPL-MCNC: 0.71 MG/DL (ref 0.6–1.3)
EOSINOPHIL # BLD AUTO: 0.1 THOUSAND/ΜL (ref 0–0.61)
EOSINOPHIL NFR BLD AUTO: 2 % (ref 0–6)
ERYTHROCYTE [DISTWIDTH] IN BLOOD BY AUTOMATED COUNT: 13.2 % (ref 11.6–15.1)
GFR SERPL CREATININE-BSD FRML MDRD: 96 ML/MIN/1.73SQ M
GLUCOSE SERPL-MCNC: 99 MG/DL (ref 65–140)
HCT VFR BLD AUTO: 36.8 % (ref 36.5–49.3)
HGB BLD-MCNC: 12 G/DL (ref 12–17)
LYMPHOCYTES # BLD AUTO: 1.12 THOUSANDS/ΜL (ref 0.6–4.47)
LYMPHOCYTES NFR BLD AUTO: 22 % (ref 14–44)
MCH RBC QN AUTO: 33.1 PG (ref 26.8–34.3)
MCHC RBC AUTO-ENTMCNC: 32.6 G/DL (ref 31.4–37.4)
MCV RBC AUTO: 101 FL (ref 82–98)
MONOCYTES # BLD AUTO: 0.53 THOUSAND/ΜL (ref 0.17–1.22)
MONOCYTES NFR BLD AUTO: 10 % (ref 4–12)
NEUTROPHILS # BLD AUTO: 3.39 THOUSANDS/ΜL (ref 1.85–7.62)
NEUTS SEG NFR BLD AUTO: 65 % (ref 43–75)
NRBC BLD AUTO-RTO: 0 /100 WBCS
PLATELET # BLD AUTO: 187 THOUSANDS/UL (ref 149–390)
PMV BLD AUTO: 8.9 FL (ref 8.9–12.7)
POTASSIUM SERPL-SCNC: 4.4 MMOL/L (ref 3.5–5.3)
PROT SERPL-MCNC: 7.1 G/DL (ref 6.4–8.2)
RBC # BLD AUTO: 3.63 MILLION/UL (ref 3.88–5.62)
SODIUM SERPL-SCNC: 139 MMOL/L (ref 136–145)
TSH SERPL DL<=0.05 MIU/L-ACNC: 1.23 UIU/ML (ref 0.36–3.74)
WBC # BLD AUTO: 5.2 THOUSAND/UL (ref 4.31–10.16)

## 2017-08-27 PROCEDURE — 36415 COLL VENOUS BLD VENIPUNCTURE: CPT | Performed by: NURSE PRACTITIONER

## 2017-08-27 PROCEDURE — 85025 COMPLETE CBC W/AUTO DIFF WBC: CPT | Performed by: NURSE PRACTITIONER

## 2017-08-27 PROCEDURE — 99285 EMERGENCY DEPT VISIT HI MDM: CPT

## 2017-08-27 PROCEDURE — 84443 ASSAY THYROID STIM HORMONE: CPT | Performed by: NURSE PRACTITIONER

## 2017-08-27 PROCEDURE — 93005 ELECTROCARDIOGRAM TRACING: CPT | Performed by: NURSE PRACTITIONER

## 2017-08-27 PROCEDURE — 80053 COMPREHEN METABOLIC PANEL: CPT | Performed by: NURSE PRACTITIONER

## 2017-08-27 RX ORDER — GABAPENTIN 300 MG/1
300 CAPSULE ORAL 3 TIMES DAILY
Qty: 90 CAPSULE | Refills: 0 | Status: SHIPPED | OUTPATIENT
Start: 2017-08-27 | End: 2017-09-30

## 2017-08-27 RX ORDER — OXYCODONE HYDROCHLORIDE AND ACETAMINOPHEN 5; 325 MG/1; MG/1
2 TABLET ORAL ONCE
Status: COMPLETED | OUTPATIENT
Start: 2017-08-27 | End: 2017-08-27

## 2017-08-27 RX ADMIN — OXYCODONE HYDROCHLORIDE AND ACETAMINOPHEN 2 TABLET: 5; 325 TABLET ORAL at 14:13

## 2017-08-28 LAB
ATRIAL RATE: 57 BPM
QRS AXIS: 74 DEGREES
QRSD INTERVAL: 102 MS
QT INTERVAL: 466 MS
QTC INTERVAL: 416 MS
T WAVE AXIS: 38 DEGREES
VENTRICULAR RATE: 48 BPM

## 2017-08-28 NOTE — PROGRESS NOTES
CW met with pt who stated that he is taking his script for Percocet 10mg qid prn as prescribed by his pain management physician Dr Rebecca Domínguez in [facility]  Pt noted it no longer helps ease his chronic neck and back pain, so he took some of his wife's script for same  He noted that he has been prescribed Percocet for the past 10 years and it has lost its efficacy  Pt stated that he sustained several crushed discs in his neck, and had surgery for same 2 years ago  He reported currently suffering from neuropathy and is no longer able to drive  Pt added that in 1982 he sustained an MCI and in [facility] he underwent coronary artery surgery  He added that in 1988 he suffered yet another MCI and had a stent placed  He noted due to all of these medical issues and the physical limitations it has placed on him, his marriage has been negatively impacted and he and his wife are currently seeking a divorce  Pt is currently seeking IP detox Tx     CW left a VM at [facility], and spoke with Kala of [facility] who stated that she has beds available and to fax pt's chart to her for review  CW was then informed by pt's nurse that pt has changed his mind about going to detox since his wife arrived at his bedside  He is now requesting OP D& A Tx  Malena Galarza provided him with a D & A resource packet along with signed Crisis D/C paperwork

## 2017-09-08 ENCOUNTER — HOSPITAL ENCOUNTER (EMERGENCY)
Facility: HOSPITAL | Age: 67
Discharge: HOME | End: 2017-09-08
Attending: EMERGENCY MEDICINE
Payer: MEDICARE

## 2017-09-08 ENCOUNTER — APPOINTMENT (EMERGENCY)
Dept: RADIOLOGY | Facility: HOSPITAL | Age: 67
End: 2017-09-08
Payer: MEDICARE

## 2017-09-08 ENCOUNTER — DOCUMENTATION (OUTPATIENT)
Dept: CASE MANAGEMENT | Facility: HOSPITAL | Age: 67
End: 2017-09-08

## 2017-09-08 VITALS
BODY MASS INDEX: 25.77 KG/M2 | SYSTOLIC BLOOD PRESSURE: 132 MMHG | WEIGHT: 190 LBS | HEART RATE: 95 BPM | DIASTOLIC BLOOD PRESSURE: 88 MMHG | OXYGEN SATURATION: 98 % | TEMPERATURE: 98.7 F | RESPIRATION RATE: 20 BRPM

## 2017-09-08 DIAGNOSIS — F11.10 NARCOTIC ABUSE (HCC): ICD-10-CM

## 2017-09-08 DIAGNOSIS — R07.9 CHEST PAIN: Primary | ICD-10-CM

## 2017-09-08 LAB
ANION GAP SERPL CALCULATED.3IONS-SCNC: 9 MMOL/L (ref 4–13)
APTT PPP: 32 SECONDS (ref 23–35)
BASOPHILS # BLD AUTO: 0.03 THOUSANDS/ΜL (ref 0–0.1)
BASOPHILS NFR BLD AUTO: 1 % (ref 0–1)
BUN SERPL-MCNC: 9 MG/DL (ref 5–25)
CALCIUM SERPL-MCNC: 9 MG/DL (ref 8.3–10.1)
CHLORIDE SERPL-SCNC: 104 MMOL/L (ref 100–108)
CO2 SERPL-SCNC: 27 MMOL/L (ref 21–32)
CREAT SERPL-MCNC: 0.75 MG/DL (ref 0.6–1.3)
EOSINOPHIL # BLD AUTO: 0.11 THOUSAND/ΜL (ref 0–0.61)
EOSINOPHIL NFR BLD AUTO: 2 % (ref 0–6)
ERYTHROCYTE [DISTWIDTH] IN BLOOD BY AUTOMATED COUNT: 13.2 % (ref 11.6–15.1)
GFR SERPL CREATININE-BSD FRML MDRD: 94 ML/MIN/1.73SQ M
GLUCOSE SERPL-MCNC: 88 MG/DL (ref 65–140)
HCT VFR BLD AUTO: 34.6 % (ref 36.5–49.3)
HGB BLD-MCNC: 11.3 G/DL (ref 12–17)
INR PPP: 1.14 (ref 0.86–1.16)
LYMPHOCYTES # BLD AUTO: 0.66 THOUSANDS/ΜL (ref 0.6–4.47)
LYMPHOCYTES NFR BLD AUTO: 13 % (ref 14–44)
MCH RBC QN AUTO: 32.8 PG (ref 26.8–34.3)
MCHC RBC AUTO-ENTMCNC: 32.7 G/DL (ref 31.4–37.4)
MCV RBC AUTO: 101 FL (ref 82–98)
MONOCYTES # BLD AUTO: 0.41 THOUSAND/ΜL (ref 0.17–1.22)
MONOCYTES NFR BLD AUTO: 8 % (ref 4–12)
NEUTROPHILS # BLD AUTO: 4.04 THOUSANDS/ΜL (ref 1.85–7.62)
NEUTS SEG NFR BLD AUTO: 77 % (ref 43–75)
NRBC BLD AUTO-RTO: 0 /100 WBCS
PLATELET # BLD AUTO: 129 THOUSANDS/UL (ref 149–390)
PMV BLD AUTO: 9.2 FL (ref 8.9–12.7)
POTASSIUM SERPL-SCNC: 4.1 MMOL/L (ref 3.5–5.3)
PROTHROMBIN TIME: 14.8 SECONDS (ref 12.1–14.4)
RBC # BLD AUTO: 3.44 MILLION/UL (ref 3.88–5.62)
SODIUM SERPL-SCNC: 140 MMOL/L (ref 136–145)
TROPONIN I SERPL-MCNC: <0.02 NG/ML
TROPONIN I SERPL-MCNC: <0.02 NG/ML
WBC # BLD AUTO: 5.26 THOUSAND/UL (ref 4.31–10.16)

## 2017-09-08 PROCEDURE — 84484 ASSAY OF TROPONIN QUANT: CPT | Performed by: PHYSICIAN ASSISTANT

## 2017-09-08 PROCEDURE — 36415 COLL VENOUS BLD VENIPUNCTURE: CPT | Performed by: PHYSICIAN ASSISTANT

## 2017-09-08 PROCEDURE — 85730 THROMBOPLASTIN TIME PARTIAL: CPT | Performed by: PHYSICIAN ASSISTANT

## 2017-09-08 PROCEDURE — 71020 HB CHEST X-RAY 2VW FRONTAL&LATL: CPT

## 2017-09-08 PROCEDURE — 85610 PROTHROMBIN TIME: CPT | Performed by: PHYSICIAN ASSISTANT

## 2017-09-08 PROCEDURE — 99285 EMERGENCY DEPT VISIT HI MDM: CPT

## 2017-09-08 PROCEDURE — 85025 COMPLETE CBC W/AUTO DIFF WBC: CPT | Performed by: PHYSICIAN ASSISTANT

## 2017-09-08 PROCEDURE — 80048 BASIC METABOLIC PNL TOTAL CA: CPT | Performed by: PHYSICIAN ASSISTANT

## 2017-09-08 PROCEDURE — 93005 ELECTROCARDIOGRAM TRACING: CPT | Performed by: PHYSICIAN ASSISTANT

## 2017-09-08 RX ORDER — LORAZEPAM 1 MG/1
1 TABLET ORAL EVERY 8 HOURS PRN
Status: DISCONTINUED | OUTPATIENT
Start: 2017-09-08 | End: 2017-09-08 | Stop reason: HOSPADM

## 2017-09-08 RX ORDER — OXYCODONE HYDROCHLORIDE AND ACETAMINOPHEN 5; 325 MG/1; MG/1
1 TABLET ORAL ONCE
Status: COMPLETED | OUTPATIENT
Start: 2017-09-08 | End: 2017-09-08

## 2017-09-08 RX ORDER — LORAZEPAM 1 MG/1
1 TABLET ORAL ONCE
Status: COMPLETED | OUTPATIENT
Start: 2017-09-08 | End: 2017-09-08

## 2017-09-08 RX ADMIN — OXYCODONE HYDROCHLORIDE AND ACETAMINOPHEN 1 TABLET: 5; 325 TABLET ORAL at 17:00

## 2017-09-08 RX ADMIN — LORAZEPAM 1 MG: 1 TABLET ORAL at 13:50

## 2017-09-08 RX ADMIN — LORAZEPAM 1 MG: 1 TABLET ORAL at 17:00

## 2017-09-08 RX ADMIN — OXYCODONE HYDROCHLORIDE AND ACETAMINOPHEN 1 TABLET: 5; 325 TABLET ORAL at 13:50

## 2017-09-09 NOTE — PROGRESS NOTES
CW met with pt who states that he would like to be prescribed a non-narcotic pain reliever, but is NOT interested in going to a rehab or detox facility at this time  He stated that he truly doesn't want to stop taking Oxycontin as he requires it to keep his cervical spine and sciatic pain at bay  CW reminded pt that he was provided with a complete listing of OP D & A providers when he was last seen by CW in the ED approximately 1 5 weeks ago, and he may wish to consider following up with same  CW agreed to speak with YOVANNY Castañeda re: alternate medication options to narcotics  Pt denied any SI, HI, AH or VH or any psychiatric complaints at this time  CW spoke with YOVANNY Castañeda who is in agreement with Tx plan for OP D & A Tx and will D/C pt home

## 2017-09-10 ENCOUNTER — HOSPITAL ENCOUNTER (EMERGENCY)
Facility: HOSPITAL | Age: 67
Discharge: HOME | End: 2017-09-10
Attending: EMERGENCY MEDICINE
Payer: MEDICARE

## 2017-09-10 ENCOUNTER — APPOINTMENT (EMERGENCY)
Dept: RADIOLOGY | Facility: HOSPITAL | Age: 67
End: 2017-09-10
Payer: MEDICARE

## 2017-09-10 ENCOUNTER — APPOINTMENT (EMERGENCY)
Dept: CT IMAGING | Facility: HOSPITAL | Age: 67
End: 2017-09-10
Payer: MEDICARE

## 2017-09-10 VITALS
OXYGEN SATURATION: 96 % | RESPIRATION RATE: 16 BRPM | DIASTOLIC BLOOD PRESSURE: 58 MMHG | HEART RATE: 80 BPM | WEIGHT: 200.18 LBS | TEMPERATURE: 98.1 F | BODY MASS INDEX: 27.15 KG/M2 | SYSTOLIC BLOOD PRESSURE: 127 MMHG

## 2017-09-10 DIAGNOSIS — J98.01 BRONCHOSPASM: Primary | ICD-10-CM

## 2017-09-10 LAB
ANION GAP SERPL CALCULATED.3IONS-SCNC: 11 MMOL/L (ref 4–13)
BASOPHILS # BLD AUTO: 0.02 THOUSANDS/ΜL (ref 0–0.1)
BASOPHILS NFR BLD AUTO: 0 % (ref 0–1)
BUN SERPL-MCNC: 13 MG/DL (ref 5–25)
CALCIUM SERPL-MCNC: 8.8 MG/DL (ref 8.3–10.1)
CHLORIDE SERPL-SCNC: 103 MMOL/L (ref 100–108)
CO2 SERPL-SCNC: 26 MMOL/L (ref 21–32)
CREAT SERPL-MCNC: 0.84 MG/DL (ref 0.6–1.3)
DEPRECATED D DIMER PPP: 771 NG/ML (FEU) (ref 0–424)
EOSINOPHIL # BLD AUTO: 0.08 THOUSAND/ΜL (ref 0–0.61)
EOSINOPHIL NFR BLD AUTO: 2 % (ref 0–6)
ERYTHROCYTE [DISTWIDTH] IN BLOOD BY AUTOMATED COUNT: 13.1 % (ref 11.6–15.1)
GFR SERPL CREATININE-BSD FRML MDRD: 90 ML/MIN/1.73SQ M
GLUCOSE SERPL-MCNC: 111 MG/DL (ref 65–140)
HCT VFR BLD AUTO: 31.8 % (ref 36.5–49.3)
HGB BLD-MCNC: 10.5 G/DL (ref 12–17)
INR PPP: 1.33 (ref 0.86–1.16)
LYMPHOCYTES # BLD AUTO: 0.87 THOUSANDS/ΜL (ref 0.6–4.47)
LYMPHOCYTES NFR BLD AUTO: 16 % (ref 14–44)
MCH RBC QN AUTO: 32.7 PG (ref 26.8–34.3)
MCHC RBC AUTO-ENTMCNC: 33 G/DL (ref 31.4–37.4)
MCV RBC AUTO: 99 FL (ref 82–98)
MONOCYTES # BLD AUTO: 0.5 THOUSAND/ΜL (ref 0.17–1.22)
MONOCYTES NFR BLD AUTO: 9 % (ref 4–12)
NEUTROPHILS # BLD AUTO: 3.93 THOUSANDS/ΜL (ref 1.85–7.62)
NEUTS SEG NFR BLD AUTO: 73 % (ref 43–75)
NRBC BLD AUTO-RTO: 0 /100 WBCS
NT-PROBNP SERPL-MCNC: 957 PG/ML
PLATELET # BLD AUTO: 138 THOUSANDS/UL (ref 149–390)
PMV BLD AUTO: 9.5 FL (ref 8.9–12.7)
POTASSIUM SERPL-SCNC: 3.4 MMOL/L (ref 3.5–5.3)
PROTHROMBIN TIME: 16.8 SECONDS (ref 12.1–14.4)
RBC # BLD AUTO: 3.21 MILLION/UL (ref 3.88–5.62)
SODIUM SERPL-SCNC: 140 MMOL/L (ref 136–145)
TROPONIN I SERPL-MCNC: <0.02 NG/ML
WBC # BLD AUTO: 5.41 THOUSAND/UL (ref 4.31–10.16)

## 2017-09-10 PROCEDURE — 99285 EMERGENCY DEPT VISIT HI MDM: CPT

## 2017-09-10 PROCEDURE — 84484 ASSAY OF TROPONIN QUANT: CPT | Performed by: EMERGENCY MEDICINE

## 2017-09-10 PROCEDURE — 36415 COLL VENOUS BLD VENIPUNCTURE: CPT | Performed by: EMERGENCY MEDICINE

## 2017-09-10 PROCEDURE — 71275 CT ANGIOGRAPHY CHEST: CPT

## 2017-09-10 PROCEDURE — 80048 BASIC METABOLIC PNL TOTAL CA: CPT | Performed by: EMERGENCY MEDICINE

## 2017-09-10 PROCEDURE — 96374 THER/PROPH/DIAG INJ IV PUSH: CPT

## 2017-09-10 PROCEDURE — 93005 ELECTROCARDIOGRAM TRACING: CPT | Performed by: EMERGENCY MEDICINE

## 2017-09-10 PROCEDURE — 85610 PROTHROMBIN TIME: CPT | Performed by: EMERGENCY MEDICINE

## 2017-09-10 PROCEDURE — 85379 FIBRIN DEGRADATION QUANT: CPT | Performed by: EMERGENCY MEDICINE

## 2017-09-10 PROCEDURE — 71020 HB CHEST X-RAY 2VW FRONTAL&LATL: CPT

## 2017-09-10 PROCEDURE — 96375 TX/PRO/DX INJ NEW DRUG ADDON: CPT

## 2017-09-10 PROCEDURE — 83880 ASSAY OF NATRIURETIC PEPTIDE: CPT | Performed by: EMERGENCY MEDICINE

## 2017-09-10 PROCEDURE — 85025 COMPLETE CBC W/AUTO DIFF WBC: CPT | Performed by: EMERGENCY MEDICINE

## 2017-09-10 RX ORDER — LORAZEPAM 2 MG/ML
0.5 INJECTION INTRAMUSCULAR ONCE
Status: COMPLETED | OUTPATIENT
Start: 2017-09-10 | End: 2017-09-10

## 2017-09-10 RX ORDER — METHYLPREDNISOLONE SODIUM SUCCINATE 125 MG/2ML
125 INJECTION, POWDER, LYOPHILIZED, FOR SOLUTION INTRAMUSCULAR; INTRAVENOUS ONCE
Status: COMPLETED | OUTPATIENT
Start: 2017-09-10 | End: 2017-09-10

## 2017-09-10 RX ORDER — PREDNISONE 20 MG/1
40 TABLET ORAL DAILY
Qty: 10 TABLET | Refills: 0 | Status: SHIPPED | OUTPATIENT
Start: 2017-09-10 | End: 2017-09-15

## 2017-09-10 RX ORDER — OXYCODONE HYDROCHLORIDE AND ACETAMINOPHEN 5; 325 MG/1; MG/1
2 TABLET ORAL ONCE
Status: COMPLETED | OUTPATIENT
Start: 2017-09-10 | End: 2017-09-10

## 2017-09-10 RX ADMIN — IOHEXOL 85 ML: 350 INJECTION, SOLUTION INTRAVENOUS at 18:19

## 2017-09-10 RX ADMIN — METHYLPREDNISOLONE SODIUM SUCCINATE 125 MG: 125 INJECTION, POWDER, FOR SOLUTION INTRAMUSCULAR; INTRAVENOUS at 18:04

## 2017-09-10 RX ADMIN — LORAZEPAM 0.5 MG: 2 INJECTION INTRAMUSCULAR; INTRAVENOUS at 17:00

## 2017-09-10 RX ADMIN — OXYCODONE HYDROCHLORIDE AND ACETAMINOPHEN 2 TABLET: 5; 325 TABLET ORAL at 18:35

## 2017-09-11 ENCOUNTER — HOSPITAL ENCOUNTER (EMERGENCY)
Facility: HOSPITAL | Age: 67
Discharge: HOME | End: 2017-09-11
Attending: EMERGENCY MEDICINE | Admitting: EMERGENCY MEDICINE
Payer: MEDICARE

## 2017-09-11 VITALS
DIASTOLIC BLOOD PRESSURE: 82 MMHG | HEART RATE: 87 BPM | OXYGEN SATURATION: 96 % | TEMPERATURE: 97.8 F | RESPIRATION RATE: 16 BRPM | SYSTOLIC BLOOD PRESSURE: 143 MMHG | WEIGHT: 190 LBS | HEIGHT: 75 IN | BODY MASS INDEX: 23.62 KG/M2

## 2017-09-11 DIAGNOSIS — F41.9 ANXIETY: Primary | ICD-10-CM

## 2017-09-11 LAB
ATRIAL RATE: 71 BPM
QRS AXIS: 87 DEGREES
QRSD INTERVAL: 96 MS
QT INTERVAL: 414 MS
QTC INTERVAL: 430 MS
T WAVE AXIS: -9 DEGREES
VENTRICULAR RATE: 65 BPM

## 2017-09-11 PROCEDURE — 99284 EMERGENCY DEPT VISIT MOD MDM: CPT

## 2017-09-12 ENCOUNTER — GENERIC CONVERSION - ENCOUNTER (OUTPATIENT)
Dept: OTHER | Facility: OTHER | Age: 67
End: 2017-09-12

## 2017-09-12 LAB
ATRIAL RATE: 65 BPM
QRS AXIS: 85 DEGREES
QRSD INTERVAL: 98 MS
QT INTERVAL: 354 MS
QTC INTERVAL: 387 MS
T WAVE AXIS: 143 DEGREES
VENTRICULAR RATE: 72 BPM

## 2017-09-25 ENCOUNTER — ALLSCRIPTS OFFICE VISIT (OUTPATIENT)
Dept: OTHER | Facility: OTHER | Age: 67
End: 2017-09-25

## 2017-09-30 ENCOUNTER — HOSPITAL ENCOUNTER (EMERGENCY)
Facility: HOSPITAL | Age: 67
Discharge: HOME | End: 2017-09-30
Attending: EMERGENCY MEDICINE | Admitting: EMERGENCY MEDICINE
Payer: MEDICARE

## 2017-09-30 ENCOUNTER — APPOINTMENT (EMERGENCY)
Dept: ULTRASOUND IMAGING | Facility: HOSPITAL | Age: 67
End: 2017-09-30
Payer: MEDICARE

## 2017-09-30 VITALS
HEART RATE: 62 BPM | HEIGHT: 75 IN | SYSTOLIC BLOOD PRESSURE: 155 MMHG | OXYGEN SATURATION: 96 % | TEMPERATURE: 98.1 F | WEIGHT: 191.14 LBS | BODY MASS INDEX: 23.77 KG/M2 | RESPIRATION RATE: 16 BRPM | DIASTOLIC BLOOD PRESSURE: 72 MMHG

## 2017-09-30 DIAGNOSIS — S39.013A STRAIN OF MUSCLE OF RIGHT GROIN REGION: Primary | ICD-10-CM

## 2017-09-30 PROCEDURE — 99284 EMERGENCY DEPT VISIT MOD MDM: CPT

## 2017-09-30 PROCEDURE — 93926 LOWER EXTREMITY STUDY: CPT

## 2017-09-30 RX ORDER — ALPRAZOLAM 0.5 MG/1
2 TABLET ORAL ONCE
Status: COMPLETED | OUTPATIENT
Start: 2017-09-30 | End: 2017-09-30

## 2017-09-30 RX ORDER — OXYCODONE HYDROCHLORIDE AND ACETAMINOPHEN 5; 325 MG/1; MG/1
2 TABLET ORAL ONCE
Status: COMPLETED | OUTPATIENT
Start: 2017-09-30 | End: 2017-09-30

## 2017-09-30 RX ADMIN — OXYCODONE HYDROCHLORIDE AND ACETAMINOPHEN 2 TABLET: 5; 325 TABLET ORAL at 14:22

## 2017-09-30 RX ADMIN — ALPRAZOLAM 2 MG: 0.5 TABLET ORAL at 16:25

## 2017-09-30 NOTE — ED PROVIDER NOTES
History  Chief Complaint   Patient presents with    Groin Pain     patient with groin pain on and off for approx  3 months, since cardiac catheterization and stent placement  No obvious signs of hematoma or swelling  75 y o  male presenting with right groin pain  The patient states that he had a cardiac cath/ angioplasty approximately 3 months ago and since then has been having right groin pain at the access point  He denies subsequent trauma, fever or chills, chest pain or dyspnea, constipation/ diarrhea/ dysuria, testicular pain, or abdominal pain  History provided by:  Patient and medical records  Groin Pain   Presenting symptoms: no dysuria    Associated symptoms: groin pain    Associated symptoms: no abdominal pain and no fever        Prior to Admission Medications   Prescriptions Last Dose Informant Patient Reported? Taking?    DULoxetine (CYMBALTA) 60 mg delayed release capsule   Yes Yes   Sig: Take 60 mg by mouth daily     LORazepam (ATIVAN) 1 mg tablet   Yes Yes   Sig: Take 2 mg by mouth every 8 (eight) hours as needed for anxiety     Lidocaine-Menthol 4-5 % PTCH   Yes Yes   Sig: Apply 1 patch topically     Linaclotide (LINZESS) 145 MCG CAPS   Yes Yes   Sig: Take 145 mcg by mouth daily     QUEtiapine (SEROquel) 50 mg tablet   Yes No   Sig: Take 50 mg by mouth daily at bedtime     QUEtiapine (SEROquel) 50 mg tablet   Yes No   Sig: Take 100 mg by mouth daily at bedtime     apixaban (ELIQUIS) 5 mg   Yes Yes   Sig: Take 5 mg by mouth 2 (two) times a day     aspirin 81 mg chewable tablet   No Yes   Sig: Chew 1 tablet daily   Patient taking differently: Chew 81 mg daily     atorvastatin (LIPITOR) 40 mg tablet   Yes Yes   Sig: Take 40 mg by mouth daily     budesonide-formoterol (SYMBICORT) 160-4 5 mcg/act inhaler   Yes Yes   Sig: Inhale 2 puffs 2 (two) times a day     clopidogrel (PLAVIX) 75 mg tablet   Yes Yes   Sig: Take 75 mg by mouth daily     docusate sodium (COLACE) 50 mg capsule   No Yes Sig: Take 2 capsules by mouth 2 (two) times a day   isosorbide mononitrate (IMDUR) 30 mg 24 hr tablet   Yes Yes   Sig: Take 30 mg by mouth daily     naloxegol oxalate (MOVANTIK) 25 MG tablet   Yes Yes   Sig: Take 25 mg by mouth daily   nitroglycerin (NITROSTAT) 0 4 mg SL tablet   Yes Yes   Sig: Place 0 4 mg under the tongue every 5 (five) minutes as needed for chest pain  oxyCODONE-acetaminophen (PERCOCET)  mg per tablet   Yes Yes   Sig: Take 1 tablet by mouth every 4 (four) hours as needed for moderate pain     simethicone (MYLICON) 80 mg chewable tablet   No Yes   Sig: Chew 1 tablet every 6 (six) hours as needed for flatulence   spironolactone (ALDACTONE) 50 mg tablet   No Yes   Sig: Take 1 tablet by mouth 2 (two) times a day for 30 days   Patient taking differently: Take 50 mg by mouth 2 (two) times a day     torsemide (DEMADEX) 20 mg tablet   Yes Yes   Sig: Take 40 mg by mouth 2 (two) times a day     zolpidem (AMBIEN) 10 mg tablet   Yes Yes   Sig: Take 10 mg by mouth daily at bedtime as needed for sleep        Facility-Administered Medications: None       Past Medical History:   Diagnosis Date    Atrial fibrillation     CAD (coronary artery disease)     Cardiac disease     Cervicalgia     Chronic diastolic CHF (congestive heart failure)     Chronic pain     COPD (chronic obstructive pulmonary disease)     Hyperlipidemia     Hypertension     Neuropathy        Past Surgical History:   Procedure Laterality Date    APPENDECTOMY      CARDIAC SURGERY      graft restented    CERVICAL FUSION  10/21/2015    CORONARY ARTERY BYPASS GRAFT  1983       Family History   Problem Relation Age of Onset    Heart failure Mother     Sudden death Father      I have reviewed and agree with the history as documented      Social History   Substance Use Topics    Smoking status: Former Smoker     Quit date: 1982    Smokeless tobacco: Never Used    Alcohol use No        Review of Systems   Constitutional: Negative for fever  HENT: Negative for congestion  Eyes: Negative for visual disturbance  Respiratory: Negative for shortness of breath  Cardiovascular: Negative for chest pain  Gastrointestinal: Negative for abdominal pain  Genitourinary: Negative for dysuria  Musculoskeletal: Negative for back pain  Neurological: Negative for numbness  Psychiatric/Behavioral: Negative for confusion  All other systems reviewed and are negative  Physical Exam  ED Triage Vitals   Temperature Pulse Respirations Blood Pressure SpO2   09/30/17 1333 09/30/17 1314 09/30/17 1314 09/30/17 1314 09/30/17 1314   98 1 °F (36 7 °C) 69 16 144/74 97 %      Temp Source Heart Rate Source Patient Position - Orthostatic VS BP Location FiO2 (%)   09/30/17 1333 09/30/17 1314 09/30/17 1314 09/30/17 1314 --   Oral Monitor Lying Right arm       Pain Score       09/30/17 1314       7           Physical Exam   Constitutional: He is oriented to person, place, and time  He appears well-developed and well-nourished  No distress  HENT:   Head: Normocephalic and atraumatic  Eyes: Pupils are equal, round, and reactive to light  Neck: Normal range of motion  Neck supple  Cardiovascular:   No murmur heard  Irregular   Pulmonary/Chest: Effort normal and breath sounds normal  No respiratory distress  Abdominal: Soft  Bowel sounds are normal    Genitourinary:   Genitourinary Comments: Strong bilateral femoral pulses  No palpable inguinal mass  No testicular swelling or palpable inguinal hernia   Musculoskeletal: Normal range of motion  He exhibits no edema  Neurological: He is alert and oriented to person, place, and time  Skin: Skin is warm and dry  Psychiatric: His behavior is normal    Nursing note and vitals reviewed        ED Medications  Medications   ALPRAZolam (XANAX) tablet 2 mg (not administered)   oxyCODONE-acetaminophen (PERCOCET) 5-325 mg per tablet 2 tablet (2 tablets Oral Given 9/30/17 1422)       Diagnostic Studies  Labs Reviewed - No data to display    VAS pseudoaneurysm study   ED Interpretation   Prelim report from [facility]- no pseudoaneurysm or mass, triphasic   flow          Procedures  Procedures      Phone Contacts  ED Phone Contact    ED Course  ED Course as of Sep 30 1600   Sat Sep 30, 2017   1559 Patient requesting his home dose of 2 mg xanax prior to discharge as he would normally take it at this time  He states that his wife is driving him home  Assessment:   75 y o  male presenting with right groin pain    Differential diagnosis includes, but is not limited to: pseudoaneurysm, hematoma, groin strain  No inguinal hernia present on exam     Plan:  Pain control (patient reports that he takes 10 mg percocet at home, requesting home dose, says his wife will be driving home)  US femoral artery  Reassess      MDM  Number of Diagnoses or Management Options  Strain of muscle of right groin region: established and worsening  Diagnosis management comments: No pseudoaneurysm, hematoma, or inguinal hernia noted on US; triphasic arterial flow noted by US tech  I informed the patient of these results as well as plan to follow up with his PMD/ cardiologist  He has a cane and walker at home which he uses as needed  I am hesitant to add on any new pain medications as patient is already prescribed a number of medications at baseline  He understands and agrees with plan         Amount and/or Complexity of Data Reviewed  Tests in the radiology section of CPT®: ordered and reviewed  Decide to obtain previous medical records or to obtain history from someone other than the patient: yes  Obtain history from someone other than the patient: yes  Review and summarize past medical records: yes  Independent visualization of images, tracings, or specimens: yes    Risk of Complications, Morbidity, and/or Mortality  Presenting problems: moderate  Management options: low    Patient Progress  Patient progress: improved    CritCare Time    Disposition  Final diagnoses:   Strain of muscle of right groin region     ED Disposition     ED Disposition Condition Comment    Discharge  [address] discharge to home/self care  Condition at discharge: Good        Follow-up Information     Follow up With Specialties Details Why Kenneth Villanueva MD Family Medicine Schedule an appointment as soon as possible for a visit in 2 days for re-evaluation and primary care [facility] [address] Stephenie Sparks MD Cardiology, Radiology Schedule an appointment as soon as possible for a visit in 1 week as needed for cardiology follow up [facility] [id number]  [phone]          Patient's Medications   Discharge Prescriptions    No medications on file     No discharge procedures on file      ED Provider  Electronically Signed by [employer], Davian 2, DO  Resident  09/30/17 3246

## 2017-09-30 NOTE — ED NOTES
Not enough xanax stocked in any accudose in ED, call placed to pharmacy to request additional tab to give total dose    Pharmacy states they will send someone to fill machine asap     Yue Adames RN  09/30/17 7471

## 2017-09-30 NOTE — DISCHARGE INSTRUCTIONS
Groin Strain   WHAT YOU NEED TO KNOW:   A groin strain occurs when a muscle or tendon is stretched or torn  The groin is the area where your abdomen meets your upper leg  Tendons are cords of tissue that attach muscle to bone  DISCHARGE INSTRUCTIONS:   Rest your groin area: You will need to rest your groin area from activities that may cause you pain  This will help decrease the risk of more damage to your groin  Use crutches or a cane as directed  Ice your groin area: Ice your groin area to help decrease swelling and pain  Put crushed ice in a plastic bag and cover it with a towel  Put the ice on your groin area for 15 to 20 minutes every hour  Do this for as many days as directed  Wrap your groin:  Your healthcare provider will instruct you on how to wrap your groin with an elastic bandage or tape  When you wrap your groin, it becomes more stable  Wrapping your groin can help decrease your pain  Elevate the injured area:  Keep the leg on your injured side raised to help decrease pain and swelling in your groin area  Use pillows, blankets, or rolled towels to elevate your leg as often as you can  Medicine:   · Pain medicine: You may be given medicine such as aspirin or ibuprofen to decrease or take away pain  Do not wait until the pain is severe before you take your medicine  · Take your medicine as directed:  Call your healthcare provider if you think your medicine is not helping or if you have side effects  Tell him if you take any vitamins, herbs, or other medicines  Keep a list of the medicines you take  Include the amounts, and when and why you take them  Bring the list or the pill bottles to follow-up visits  Activity:  You may need to exercise the injured area after your pain and swelling have decreased  Exercises will help prevent stiffness in the injured area and increase strength  Return to your normal activities slowly   You could injure yourself again if you try to return to normal activity too soon  Return to your normal level of activity when:  · You do not have pain when you walk, run, or jump  · Your injured leg moves like your uninjured leg  · Your injured leg feels as strong as your uninjured leg  Prevent another injury:   · Warm up and stretch before you exercise  · Wear shoes that fit well  · Wear equipment to protect yourself when you play sports  · Do exercises as directed to build muscle strength  Stop if you feel pain or tightness in your groin  Follow up with your healthcare provider as directed:  Write down any questions you have so you remember to ask them in your follow-up visits  Contact your healthcare provider if:   · You have increased swelling and pain in your injured area  · You have increased groin pain when standing or with movement  · You have questions or concerns about your injury or treatment  © 2017 [facility] Information is for End User's use only and may not be sold, redistributed or otherwise used for commercial purposes  All illustrations and images included in CareNotes® are the copyrighted property of [employer]  or Jovanni Perales  The above information is an  only  It is not intended as medical advice for individual conditions or treatments  Talk to your doctor, nurse or pharmacist before following any medical regimen to see if it is safe and effective for you

## 2017-10-03 ENCOUNTER — GENERIC CONVERSION - ENCOUNTER (OUTPATIENT)
Dept: OTHER | Facility: OTHER | Age: 67
End: 2017-10-03

## 2017-10-04 ENCOUNTER — APPOINTMENT (EMERGENCY)
Dept: CT IMAGING | Facility: HOSPITAL | Age: 67
End: 2017-10-04
Payer: MEDICARE

## 2017-10-04 ENCOUNTER — HOSPITAL ENCOUNTER (EMERGENCY)
Facility: HOSPITAL | Age: 67
Discharge: HOME | End: 2017-10-04
Admitting: EMERGENCY MEDICINE
Payer: MEDICARE

## 2017-10-04 ENCOUNTER — GENERIC CONVERSION - ENCOUNTER (OUTPATIENT)
Dept: OTHER | Facility: OTHER | Age: 67
End: 2017-10-04

## 2017-10-04 VITALS
BODY MASS INDEX: 23.97 KG/M2 | HEART RATE: 70 BPM | OXYGEN SATURATION: 99 % | DIASTOLIC BLOOD PRESSURE: 91 MMHG | RESPIRATION RATE: 16 BRPM | WEIGHT: 191.8 LBS | TEMPERATURE: 98.8 F | SYSTOLIC BLOOD PRESSURE: 132 MMHG

## 2017-10-04 DIAGNOSIS — R20.0 NUMBNESS IN BOTH HANDS: Primary | ICD-10-CM

## 2017-10-04 PROCEDURE — 70450 CT HEAD/BRAIN W/O DYE: CPT

## 2017-10-04 PROCEDURE — 72125 CT NECK SPINE W/O DYE: CPT

## 2017-10-04 PROCEDURE — 99284 EMERGENCY DEPT VISIT MOD MDM: CPT

## 2017-10-04 RX ORDER — ONDANSETRON 4 MG/1
4 TABLET, ORALLY DISINTEGRATING ORAL ONCE
Status: COMPLETED | OUTPATIENT
Start: 2017-10-04 | End: 2017-10-04

## 2017-10-04 RX ORDER — OXYCODONE HYDROCHLORIDE 15 MG/1
5 TABLET ORAL EVERY 6 HOURS PRN
COMMUNITY
End: 2018-07-11

## 2017-10-04 RX ADMIN — ONDANSETRON 4 MG: 4 TABLET, ORALLY DISINTEGRATING ORAL at 18:28

## 2017-10-04 NOTE — ED PROVIDER NOTES
History  Chief Complaint   Patient presents with    Dizziness     pt presents with dizziness, nausea and numbness in b/l arms for one week      Remedios Scott is a 75 y o  male w PMH AF, CAD, CHF, COPD, HTN, HLD who presents for evaluation of numbness  Patient has numbness of bilateral hands  It has progressed over the past few weeks  He describes numbness in a stocking distribution of both hands  He had a fall last week and was not evaluated for this  Reports he lost his balance and bumped his head off the side of the wall  Reports since then has been feeling fuzzy    This morning when he stood up felt lightheaded but no true dizziness  No subsequent falls  Prior to Admission Medications   Prescriptions Last Dose Informant Patient Reported? Taking?    DULoxetine (CYMBALTA) 60 mg delayed release capsule   Yes No   Sig: Take 60 mg by mouth daily     LORazepam (ATIVAN) 1 mg tablet   Yes No   Sig: Take 2 mg by mouth every 8 (eight) hours as needed for anxiety     Lidocaine-Menthol 4-5 % PTCH   Yes No   Sig: Apply 1 patch topically     Linaclotide (LINZESS) 145 MCG CAPS   Yes No   Sig: Take 145 mcg by mouth daily     QUEtiapine (SEROquel) 50 mg tablet   Yes No   Sig: Take 50 mg by mouth daily at bedtime     QUEtiapine (SEROquel) 50 mg tablet   Yes No   Sig: Take 100 mg by mouth daily at bedtime     apixaban (ELIQUIS) 5 mg   Yes No   Sig: Take 5 mg by mouth 2 (two) times a day     aspirin 81 mg chewable tablet   No No   Sig: Chew 1 tablet daily   Patient taking differently: Chew 81 mg daily     atorvastatin (LIPITOR) 40 mg tablet   Yes No   Sig: Take 40 mg by mouth daily     budesonide-formoterol (SYMBICORT) 160-4 5 mcg/act inhaler   Yes No   Sig: Inhale 2 puffs 2 (two) times a day     clopidogrel (PLAVIX) 75 mg tablet   Yes No   Sig: Take 75 mg by mouth daily     docusate sodium (COLACE) 50 mg capsule   No No   Sig: Take 2 capsules by mouth 2 (two) times a day   isosorbide mononitrate (IMDUR) 30 mg 24 hr tablet   Yes No   Sig: Take 30 mg by mouth daily     naloxegol oxalate (MOVANTIK) 25 MG tablet   Yes No   Sig: Take 25 mg by mouth daily   nitroglycerin (NITROSTAT) 0 4 mg SL tablet   Yes No   Sig: Place 0 4 mg under the tongue every 5 (five) minutes as needed for chest pain  oxyCODONE (ROXICODONE) 15 mg immediate release tablet   Yes Yes   Sig: Take 15 mg by mouth every 6 (six) hours as needed for moderate pain   simethicone (MYLICON) 80 mg chewable tablet   No No   Sig: Chew 1 tablet every 6 (six) hours as needed for flatulence   spironolactone (ALDACTONE) 50 mg tablet   No No   Sig: Take 1 tablet by mouth 2 (two) times a day for 30 days   Patient taking differently: Take 50 mg by mouth 2 (two) times a day     torsemide (DEMADEX) 20 mg tablet   Yes No   Sig: Take 40 mg by mouth 2 (two) times a day     zolpidem (AMBIEN) 10 mg tablet   Yes No   Sig: Take 10 mg by mouth daily at bedtime as needed for sleep        Facility-Administered Medications: None       Past Medical History:   Diagnosis Date    Atrial fibrillation (HCC)     CAD (coronary artery disease)     Cardiac disease     Cervicalgia     [facility]     Chronic pain     [facility] obstructive [facility] [address] [facility]     Hyperlipidemia     Hypertension     Neuropathy        Past Surgical History:   Procedure Laterality Date    APPENDECTOMY      CARDIAC SURGERY      graft restented    CERVICAL FUSION  10/21/2015    CORONARY ARTERY BYPASS GRAFT  1983       Family History   Problem Relation Age of Onset    Heart failure Mother     Sudden death Father      I have reviewed and agree with the history as documented  Social History   Substance Use Topics    Smoking status: Former Smoker     Quit date: 1982    Smokeless tobacco: Never Used    Alcohol use No        Review of Systems   Constitutional: Negative for activity change, chills, diaphoresis, fatigue and fever     HENT: Negative for congestion and rhinorrhea  Eyes: Negative for pain  Respiratory: Negative for cough, chest tightness, shortness of breath and wheezing  Cardiovascular: Negative for chest pain and palpitations  Gastrointestinal: Negative for abdominal distention, constipation, diarrhea, nausea and vomiting  Genitourinary: Negative for difficulty urinating and dysuria  Musculoskeletal: Negative for arthralgias and myalgias  Neurological: Positive for numbness  Negative for dizziness, weakness, light-headedness and headaches  Psychiatric/Behavioral: The patient is not nervous/anxious  Physical Exam  ED Triage Vitals [10/04/17 1427]   Temperature Pulse Respirations Blood Pressure SpO2   98 8 °F (37 1 °C) 70 16 132/91 99 %      Temp Source Heart Rate Source Patient Position - Orthostatic VS BP Location FiO2 (%)   Oral Monitor Lying Right arm --      Pain Score       7           Physical Exam   Constitutional: He is oriented to person, place, and time  He appears well-developed and well-nourished  No distress  HENT:   Head: Normocephalic and atraumatic  Eyes: Pupils are equal, round, and reactive to light  Neck: Normal range of motion  Neck supple  No tracheal deviation present  Slight pain to palpation of the distal c spine    Cardiovascular: Normal rate, regular rhythm, normal heart sounds and intact distal pulses  Exam reveals no gallop and no friction rub  No murmur heard  Pulmonary/Chest: Effort normal and breath sounds normal  No respiratory distress  He has no wheezes  He has no rales  Abdominal: Soft  Bowel sounds are normal  He exhibits no distension and no mass  There is no tenderness  There is no guarding  Musculoskeletal: He exhibits no edema or deformity  Has sensation but reports numbness of the b/l hands   Normal motor nerve function  From of hands / fingers  No edema / erythema or pain w passive rom      Neurological: He is alert and oriented to person, place, and time  Skin: Skin is warm and dry  He is not diaphoretic  Psychiatric: He has a normal mood and affect  His behavior is normal    Nursing note and vitals reviewed  ED Medications  Medications   ondansetron (ZOFRAN-ODT) dispersible tablet 4 mg (4 mg Oral Given 10/4/17 7951)       Diagnostic Studies  Labs Reviewed - No data to display    CT spine cervical without contrast   Final Result      No acute fracture or dislocation  Limited evaluation of the soft tissues although the spinal canal measures approximately 8 mm in AP dimension at C3-C4, suggesting possible narrowing at this level  Degenerative changes and posterior fusion as described above is otherwise not    significantly changed from prior exam December 30, 2016 as described above  Workstation performed: [id number]         CT head without contrast   Final Result      No acute intracranial abnormality  6 mm focal soft tissue nodule noted in the right frontal scalp, correlate clinically with physical exam          Workstation performed: [id number]             Procedures  Procedures      Phone Contacts  ED Phone Contact    ED Course  ED Course                                MDM  Number of Diagnoses or Management Options  Numbness in both hands:   Diagnosis management comments: DDX includes but not ltd to:   Consider ICH / c spine injury / fx 2/2 recently hitting head against wall  Numbness of hands is stocking in nature - there is no true cervical radiculopathy     Plan is to obtain:  CT head to check for any acute intracranial abnormality/ ICH/ SAH/ SDH/ lesion/ mass/ CVA   CT C spine for acute osseous abnormality     Based on results:  Pt appropriate for outpt discharge and follow up w neurosurgery team  The possible spinal cord narrowing on CT was discussed w pt  It does not correlate w his current deficits   and he has no deficits at C3 / C4 to pursue additional images in the ED  Return parameters discussed   Pt requires f/u as an outpt  Pt expresses understanding w above treatment plan  All questions answered prior to d/c  CritCare Time    Disposition  Final diagnoses:   Numbness in both hands     ED Disposition     ED Disposition Condition Comment    Discharge  [address] discharge to home/self care      Condition at discharge: Good        Follow-up Information     Follow up With Specialties Details Why [facility] Emergency Department Emergency Medicine  If symptoms worsen [address]  [address] [place] [address] ED, [address] [facility], [facility], [address] [place] [address] MD Sarah[facility] Neurosurgery Call in 1 day  [address] [facility] [address]  [phone]           Discharge Medication List as of 10/4/2017  5:55 PM      CONTINUE these medications which have NOT CHANGED    Details   oxyCODONE (ROXICODONE) 15 mg immediate release tablet Take 15 mg by mouth every 6 (six) hours as needed for moderate pain, Historical Med      apixaban (ELIQUIS) 5 mg Take 5 mg by mouth 2 (two) times a day  , Historical Med      aspirin 81 mg chewable tablet Chew 1 tablet daily, Starting Fri 6/30/2017, No Print      atorvastatin (LIPITOR) 40 mg tablet Take 40 mg by mouth daily  , Historical Med      budesonide-formoterol (SYMBICORT) 160-4 5 mcg/act inhaler Inhale 2 puffs 2 (two) times a day  , Historical Med      clopidogrel (PLAVIX) 75 mg tablet Take 75 mg by mouth daily  , Historical Med      docusate sodium (COLACE) 50 mg capsule Take 2 capsules by mouth 2 (two) times a day, Starting Tue 7/18/2017, Print      DULoxetine (CYMBALTA) 60 mg delayed release capsule Take 60 mg by mouth daily  , Historical Med      isosorbide mononitrate (IMDUR) 30 mg 24 hr tablet Take 30 mg by mouth daily  , Historical Med      Lidocaine-Menthol 4-5 % PTCH Apply 1 patch topically  , Historical Med      Linaclotide (LINZESS) 145 MCG CAPS Take 145 mcg by mouth daily  , Historical Med      LORazepam (ATIVAN) 1 mg tablet Take 2 mg by mouth every 8 (eight) hours as needed for anxiety  , Historical Med      naloxegol oxalate (MOVANTIK) 25 MG tablet Take 25 mg by mouth daily, Historical Med      nitroglycerin (NITROSTAT) 0 4 mg SL tablet Place 0 4 mg under the tongue every 5 (five) minutes as needed for chest pain , Until Discontinued, Historical Med      !! QUEtiapine (SEROquel) 50 mg tablet Take 50 mg by mouth daily at bedtime  , Historical Med      !! QUEtiapine (SEROquel) 50 mg tablet Take 100 mg by mouth daily at bedtime  , Starting Thu 9/4/2014, Historical Med      simethicone (MYLICON) 80 mg chewable tablet Chew 1 tablet every 6 (six) hours as needed for flatulence, Starting Sun 7/16/2017, No Print      spironolactone (ALDACTONE) 50 mg tablet Take 1 tablet by mouth 2 (two) times a day for 30 days, Starting Tue 11/15/2016, Until Sat 9/30/2017, Print      torsemide (DEMADEX) 20 mg tablet Take 40 mg by mouth 2 (two) times a day  , Historical Med      zolpidem (AMBIEN) 10 mg tablet Take 10 mg by mouth daily at bedtime as needed for sleep  , Historical Med       !! - Potential duplicate medications found  Please discuss with provider  No discharge procedures on file      ED Provider  Electronically Signed by       Esperanza Cardenas PA-C  10/04/17 2286

## 2017-10-04 NOTE — DISCHARGE INSTRUCTIONS
Cervical Spinal Stenosis   WHAT YOU NEED TO KNOW:   Cervical spinal stenosis is narrowing of the spinal canal in your neck  Your spinal canal holds your spinal cord  When your spinal canal narrows, it may put pressure on your spinal cord  Cervical spinal stenosis is a chronic (long-term) condition  DISCHARGE INSTRUCTIONS:   Return to the emergency department if:   · You injure your neck, and your pain and symptoms worsen  · You have new or increased trouble walking  · You cannot control when you urinate or have a bowel movement  · You have more numbness, tingling, or weakness than before  Contact your healthcare provider if:   · Your pain does not get better or gets worse, even after you take medicines  · You have questions or concerns about your condition or care  Medicines: You may  need any of the following:  · NSAIDs , such as ibuprofen, help decrease swelling and pain  NSAIDs can cause stomach bleeding or kidney problems in certain people  If you take blood thinner medicine, always ask your healthcare provider if NSAIDs are safe for you  Always read the medicine label and follow directions  · Acetaminophen  decreases pain and fever  It is available without a doctor's order  Ask how much to take and how often to take it  Follow directions  Read the labels of all other medicines you are using to see if they also contain acetaminophen, or ask your doctor or pharmacist  Acetaminophen can cause liver damage if not taken correctly  Do not use more than 4 grams (4,000 milligrams) total of acetaminophen in one day  · Prescription pain medicine  may be given  Ask how to take this medicine safely  · Muscle relaxers  help decrease pain and muscle spasms  · Take your medicine as directed  Contact your healthcare provider if you think your medicine is not helping or if you have side effects  Tell him or her if you are allergic to any medicine   Keep a list of the medicines, vitamins, and herbs you take  Include the amounts, and when and why you take them  Bring the list or the pill bottles to follow-up visits  Carry your medicine list with you in case of an emergency  Manage your symptoms:   · Go to physical and occupational therapy  as directed  A physical therapist teaches you exercises to help improve movement and strength, and to decrease pain  An occupational therapist teaches you skills to help with your daily activities  · Apply heat on your neck for 20 to 30 minutes every 2 hours for as many days as directed  Heat helps decrease pain and muscle spasms  · Apply ice  on your neck for 15 to 20 minutes every hour or as directed  Use an ice pack, or put crushed ice in a plastic bag  Cover it with a towel before you apply it to your skin  Ice helps prevent tissue damage and decreases swelling and pain  · Avoid certain activities  Some activities may worsen your condition or cause more injury  Do not ride motorcycles or horses, climb ladders, or play football or other contact sports  Ask your provider which activities you should avoid  Follow up with your healthcare provider as directed:  Write down your questions so you remember to ask them during your visits  © 2017 [facility] Information is for End User's use only and may not be sold, redistributed or otherwise used for commercial purposes  All illustrations and images included in CareNotes® are the copyrighted property of [employer]  or J[employer] Perales  The above information is an  only  It is not intended as medical advice for individual conditions or treatments  Talk to your doctor, nurse or pharmacist before following any medical regimen to see if it is safe and effective for you

## 2017-10-24 ENCOUNTER — ALLSCRIPTS OFFICE VISIT (OUTPATIENT)
Dept: OTHER | Facility: OTHER | Age: 67
End: 2017-10-24

## 2017-11-13 ENCOUNTER — GENERIC CONVERSION - ENCOUNTER (OUTPATIENT)
Dept: FAMILY MEDICINE CLINIC | Facility: CLINIC | Age: 67
End: 2017-11-13

## 2017-11-13 ENCOUNTER — GENERIC CONVERSION - ENCOUNTER (OUTPATIENT)
Dept: OTHER | Facility: OTHER | Age: 67
End: 2017-11-13

## 2017-11-22 ENCOUNTER — GENERIC CONVERSION - ENCOUNTER (OUTPATIENT)
Dept: OTHER | Facility: OTHER | Age: 67
End: 2017-11-22
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
## 2018-01-10 NOTE — RESULT NOTES
Message  Skin culture positive for Enterococcus faecalis  Should be better with continued use of cephalexin   If not should see patient follow-up      Signatures   Electronically signed by : SETH Wills ; Oct  4 2016  8:29AM EST                       (Author)

## 2018-01-10 NOTE — MISCELLANEOUS
History of Present Illness    HFCC Additional Notes: Patient saw Kaela Zarco HF NP yesterday and was "decompensated"  Ordered extra diuretic and to call with an update  I will check in with him in am to evaluate response  Future Appointments    Date/Time Provider Specialty Site   10/11/2016 11:20 AM SETH Tinsley   [address] 95   09/22/2016 11:00 AM Jean-Claude Katz DO Orthopedic Surgery [address]     Signatures   Electronically signed by : Deja Calixto, ; Sep 14 2016  4:11PM EST                       (Author)

## 2018-01-10 NOTE — MISCELLANEOUS
History of Present Illness  A call was made to the [place] [address] Nurse  The contact name and phone number is  Mckayla Peters RN from [facility] Additional Notes: States patient has been staying with his wife who is hospitalized at [facility] with cellulitis and has not been home for about a week        Future Appointments    Date/Time Provider Specialty Site   12/20/2016 10:20 AM Abdirashid Bauman MD Family Medicine [facility]   Electronically signed by : Paxton Morelos, ; Dec  1 2016  3:40PM EST                       (Author)

## 2018-01-10 NOTE — MISCELLANEOUS
History of Present Illness  A call was made to the patient/patient's family  Status Check: today's weight is 207 5   Feels good  Patient is experiencing the following symptoms:  The patient is not experiencing signs of heart failure  Concerns expressed today consisted of: weight down 11 pounds since starting torsemide last Friday 9/16/16 (5 days)  Counseling provided to the patient  Topics counseled included medications, importance of compliance with treatment and symptoms to report  HFCC Additional Notes: Disussed weight loss with VIGNESH Corbett NP and labs reviewed with bump in Cr to 1 37  WIll decrease torsemide to 40 mg bid and potassium to 20 meq bid and repeat BMP in 1 week  Patient informed and Rx mailed for labs  Future Appointments    Date/Time Provider Specialty Site   09/22/2016 08:50 AM SETH Wheeler  [address] 9[address]   10/11/2016 11:20 AM SETH Wheeler   [address]   09/22/2016 11:00 AM Mona Found, DO Orthopedic Surgery [address]     Signatures   Electronically signed by : Meredith Parker, ; Sep 21 2016  3:57PM EST                       (Author)
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
## 2018-01-11 NOTE — MISCELLANEOUS
History of Present Illness    HFCC Additional Notes: Discussed weight loss with Jocelynn Jewell and patient to decrease his torsemide to 40 mg once daily  Patient notified and updated [facility] [place] as well and also to get a CMP at next office visit  Future Appointments    Date/Time Provider Specialty Site   11/30/2016 11:40 AM SETH Stiles   Cardiology [facility]   11/23/2016 11:20 AM Jah Arboleda MD Family Medicine [facility]   Electronically signed by : Claudene Mascot, ; Nov 21 2016  4:59PM EST                       (Author)
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
## 2018-01-12 VITALS
WEIGHT: 174 LBS | HEIGHT: 75 IN | BODY MASS INDEX: 21.64 KG/M2 | SYSTOLIC BLOOD PRESSURE: 120 MMHG | HEART RATE: 56 BPM | DIASTOLIC BLOOD PRESSURE: 58 MMHG

## 2018-01-12 VITALS
BODY MASS INDEX: 23.75 KG/M2 | HEART RATE: 63 BPM | HEIGHT: 75 IN | WEIGHT: 191 LBS | DIASTOLIC BLOOD PRESSURE: 68 MMHG | SYSTOLIC BLOOD PRESSURE: 104 MMHG

## 2018-01-12 VITALS
SYSTOLIC BLOOD PRESSURE: 120 MMHG | HEIGHT: 75 IN | DIASTOLIC BLOOD PRESSURE: 60 MMHG | WEIGHT: 193 LBS | HEART RATE: 84 BPM | OXYGEN SATURATION: 96 % | BODY MASS INDEX: 24 KG/M2 | TEMPERATURE: 98.1 F

## 2018-01-12 NOTE — MISCELLANEOUS
Message  Message Free Text Note Form: I spoke to Mr Giles today   He had taken Metolazone in the am for 2 days  He was only instructed to take Metolazone one day in am  His weight only improved 1/2 pounds  He denies dietary indiscretion  I have reviewed his diet and there does not appear to be increase in sodium intake  I have stopped his Lasix dose and placed him on Torsemide 60mg BID  He will call the office on Monday to evaluate his response  Plan   Torsemide 20 MG Oral Tablet; 3 TABS TWICE A DAY; Therapy: 77PMG6000 to (Evaluate:16Oct2016)  Requested for: 17Ecd6818; Last Rx:96Csk2314; Status: ACTIVE Ordered  Rx By: Shannon Maya; Dispense: 30 Days ; #:173 Tablet;  Refill: 0;   For: Chronic combined systolic and diastolic CHF (congestive heart failure); KASH = N; Verified Transmission to [facility]; Last Updated By: [employer]; 9/21/2016 3:59:25 PM     Signatures   Electronically signed by : COURTNEY Lewis; Sep 16 2016  5:17PM EST                       (Author)    Electronically signed by : Renita Ibarra DO; Sep 21 2016  9:06PM EST                       (Author)    Electronically signed by : Renita Ibarra DO; Sep 21 2016  9:06PM EST                       (Author)

## 2018-01-12 NOTE — MISCELLANEOUS
History of Present Illness  Communication  A message was left on voicemail requesting a return phone call  A call was made to the patient/patient's family  Future Appointments    Date/Time Provider Specialty Site   10/06/2016 01:20 PM SETH Murphy  Cardiology [facility]   10/11/2016 11:20 AM SETH Ramirez   Family [facility]   Electronically signed by : Polo Jarrett, ; Sep 30 2016  4:55PM EST                       (Author)

## 2018-01-12 NOTE — MISCELLANEOUS
History of Present Illness    The patient is being contacted for follow-up after hospitalization  This was not a readmission  The date of discharge was   He has a moderate risk for readmission  Spoke with the patient  He was discharged home with home health services  Discharge instructions were reviewed, HF folder was given to patient, zone sheet was given to patient, medications were reviewed at time of discharge and the patient filled His prescription(s)  The patient's discharge weight was 217 home scale  The patient's weight today is 217  Patient has follow-up appointment(s) on:   RO see PCP this Tuesday and Cardiology NPMiguel Angel this Wednesday  I reminded patient  The patient is currently asymptomatic  Knowledge Assessment/Teachback Questions: the patient is following diet, foods to limit/avoid, the patient is obtaining daily weights, he knows the name of his medications/water pill, the patient understands the activity/exercise plan and the patient knows when to report symptoms   Very engaged and knowledgeable  Counseling was provided to the patient  Topics counseled included diet, need for daily weights, activities of daily living, patient and family education, importance of compliance with treatment, symptoms to report and Reading food labels  Current Meds   1  ALPRAZolam 0 5 MG Oral Tablet; TAKE 1 TABLET 3 times daily; Therapy: 94LVJ4479 to (Evaluate:60Rsq0583); Last Rx:53Hni3329 Ordered   2  Atorvastatin Calcium 40 MG Oral Tablet; TAKE 1 TABLET AT BEDTIME; Therapy: 05XWC4063 to (Askew Ser)  Requested for: 56ADP9665; Last   QX:57RFC0270 Ordered   3  Clopidogrel Bisulfate 75 MG Oral Tablet (Plavix); TAKE 1 TABLET DAILY; Therapy: 61CLV3180 to (Evaluate:60Amn4138)  Requested for: 50Gym2482; Last   Rx:57Wqh2806 Ordered   4  DULoxetine HCl - 60 MG Oral Capsule Delayed Release Particles (Cymbalta); TAKE   ONE CAPSULE BY MOUTH EVERY DAY;    Therapy: 52CDV7163 to (Evaluate:05Sep2016)  Requested for: 93CKH1359; Last   Rx:09Mar2016 Ordered   5  Eliquis 5 MG Oral Tablet; TAKE ONE TABLET BY MOUTH TWICE A DAY  Requested for:   06RQP6867; Last Rx:36Hrz8756 Ordered   6  Furosemide 80 MG Oral Tablet; 1 tab by mouth twice a day  Requested for: 33Oiw1796;   Last Rx:16Lis7907 Ordered   7  Hydrochlorothiazide 50 MG Oral Tablet; TAKE 1 TABLET DAILY; Therapy: 11Aug2016 to (Evaluate:16Any9238)  Requested for: 11Aug2016; Last   Rx:11Aug2016 Ordered   8  Isosorbide Mononitrate ER 30 MG Oral Tablet Extended Release 24 Hour; TAKE 1   TABLET DAILY; Therapy: 21WME8486 to (Evaluate:02Jan2017)  Requested for: 71AOK3430; Last   Rx:08Jan2016 Ordered   9  Lidocaine 5 % External Patch; Therapy: 49BON6213 to (Evaluate:06Jun2014) Recorded   10  Linzess 145 MCG Oral Capsule; Therapy: 74MDP6272 to Recorded   11  Lisinopril 20 MG Oral Tablet; take 1 tablet by mouth one time daily; Therapy: 46HEL5818 to (Evaluate:26Tde5580)  Requested for: 04Vim0609; Last    Rx:36Pex5198 Ordered   12  Movantik 25 MG Oral Tablet; TAKE 1 TABLET Bedtime; Therapy: 44FGC1158 to (Debra Salas)  Requested for: 85FBH0488; Last    Rx:18Wvb1570 Ordered   13  Nitrostat 0 4 MG Sublingual Tablet Sublingual;    Therapy: 31JKT9817 to (Last Rx:92Qzc2706)  Requested for: 34JHM8435 Ordered   14  Oxycodone-Acetaminophen  MG Oral Tablet; TAKE 1 TABLET 4 TIMES DAILY AS    NEEDED FOR PAIN;    Therapy: 22PIJ6527 to (Evaluate:05Sen7183); Last Rx:11Aug2016 Ordered   15  Potassium Chloride ER 20 MEQ Oral Tablet Extended Release; take 2 tablet twice daily; Therapy: 35NIX1648 to (Last Rx:05Doo9243)  Requested for: 44Avj0877 Ordered   16  QUEtiapine Fumarate 50 MG Oral Tablet; TAKE ONE TABLET BY MOUTH AT BEDTIME; Therapy: 60JRQ3655 to (Evaluate:04Sep2016)  Requested for: 54NZH3608; Last    Rx:06Jun2016 Ordered   17  Symbicort 160-4 5 MCG/ACT Inhalation Aerosol;     Therapy: 34MXI7525 to (Evaluate:21Jun2014) Recorded 18  Taztia  MG Oral Capsule Extended Release 24 Hour; TAKE 1 CAPSULE DAILY; Therapy: 46BKQ6634 to (Evaluate:18Dgu9131)  Requested for: 28Wqe6237; Last    Rx:71Icg5700 Ordered   19  Zolpidem Tartrate 10 MG Oral Tablet; TAKE 1 TABLET AT BEDTIME AS NEEDED FOR    SLEEP; Therapy: 69Blq8074 to (Evaluate:50Fpj8760)  Requested for: 11Aug2016; Last    Rx:27Yuz5766 Ordered    Future Appointments    Date/Time Provider Specialty Site   08/31/2016 10:30 AM [facility]   08/30/2016 10:00 AM SETH Barnard  [address] [place] [address]     Allergies    1  No Known Drug Allergies  Denied    2   Anesthesia Extension Tubing Miscellaneous    Signatures   Electronically signed by : Harper Larsen, ; Aug 26 2016  1:23PM EST                       (Author)
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
## 2018-01-13 VITALS
RESPIRATION RATE: 16 BRPM | HEART RATE: 84 BPM | WEIGHT: 179 LBS | DIASTOLIC BLOOD PRESSURE: 73 MMHG | BODY MASS INDEX: 22.26 KG/M2 | SYSTOLIC BLOOD PRESSURE: 113 MMHG | TEMPERATURE: 98.4 F | HEIGHT: 75 IN

## 2018-01-13 VITALS
DIASTOLIC BLOOD PRESSURE: 78 MMHG | HEART RATE: 82 BPM | BODY MASS INDEX: 24.05 KG/M2 | HEIGHT: 75 IN | TEMPERATURE: 98.9 F | WEIGHT: 193.4 LBS | SYSTOLIC BLOOD PRESSURE: 128 MMHG | OXYGEN SATURATION: 96 %

## 2018-01-13 VITALS
WEIGHT: 175.13 LBS | BODY MASS INDEX: 21.78 KG/M2 | DIASTOLIC BLOOD PRESSURE: 60 MMHG | SYSTOLIC BLOOD PRESSURE: 100 MMHG | HEART RATE: 56 BPM | HEIGHT: 75 IN

## 2018-01-13 VITALS
DIASTOLIC BLOOD PRESSURE: 70 MMHG | HEIGHT: 75 IN | SYSTOLIC BLOOD PRESSURE: 130 MMHG | BODY MASS INDEX: 24.25 KG/M2 | WEIGHT: 195 LBS | HEART RATE: 62 BPM

## 2018-01-13 NOTE — MISCELLANEOUS
Message   Date: 04 Oct 2017 12:31 PM EST, Recorded By: Naomie Ruelas For: [facility],Team   Caller:  fax   Reason: Care Coordination   we received a fax stating pt was in the [facility] er  i called [phone] spoke with roula to set up a follow up appt  the groin pain was from his recent catherization he had done  no need for a ov         Active Problems    1  Allergic rhinitis due to pollen (477 0) (J30 1)   2  Anemia (285 9) (D64 9)   3  Anxiety (300 00) (F41 9)   4  Ascites (789 59) (R18 8)   5  ASCVD (arteriosclerotic cardiovascular disease) (429 2,440 9) (I25 10)   6  Atrial fibrillation (427 31) (I48 91)   7  Bilateral hand numbness (782 0) (R20 0)   8  Cervical spinal cord injury (952 00) (S14 109A)   9  Cervical spondylosis with myelopathy (721 1) (M47 12)   10  Chest pain (786 50) (R07 9)   11  Chronic combined systolic and diastolic CHF (congestive heart failure) (428 42,428 0)    (I50 42)   12  Chronic obstructive pulmonary disease (496) (J44 9)   13  Chronic pain disorder (338 4) (G89 4)   14  Constipation (564 00) (K59 00)   15  Depression (311) (F32 9)   16  Dyslipidemia (272 4) (E78 5)   17  Edema (782 3) (R60 9)   18  Edema of spinal cord (336 1) (G95 19)   19  Gait instability (781 2) (R26 81)   20  General weakness (780 79) (R53 1)   21  Hypertension (401 9) (I10)   22  Incoordination (781 3) (R27 9)   23  Insomnia (780 52) (G47 00)   24  Osteoarthritis of left shoulder (715 91) (M19 012)   25  Other constipation (564 09) (K59 09)   26  Panic attack (300 01) (F41 0)   27  Paresthesia of foot (782 0) (R20 2)   28  Physical debility (799 3) (R53 81)   29  Postnasal drip (784 91) (R09 82)   30  Sciatica of right side (724 3) (M54 31)   31  Status post cervical spinal fusion (V45 4) (Z98 1)   32  Stress due to family tension (V61 8) (Z63 8)   33  Urinary retention (788 20) (R33 9)   34  Weight loss (783 21) (R63 4)    Current Meds   1   Anoro Ellipta 62 5-25 MCG/INH Inhalation Aerosol Powder Breath Activated; INHALE 1   PUFFS Daily; Therapy: 05UQR4105 to (Evaluate:27Oct2016); Last Rx:20Oct2016 Ordered   2  Atorvastatin Calcium 40 MG Oral Tablet; TAKE 1 TABLET AT BEDTIME; Therapy: 98ZMK3523 to (Evaluate:21Jun2018)  Requested for: 26Jun2017; Last   WN:96LTQ9659 Ordered   3  BusPIRone HCl - 7 5 MG Oral Tablet; take 1 tablet every twelve hours; Therapy: 38NSU7339 to (Evaluate:19Oct2017)  Requested for: 94Wyh1109; Last   Rx:01Iuk7986 Ordered   4  Clopidogrel Bisulfate 75 MG Oral Tablet; TAKE ONE TABLET BY MOUTH ONCE DAILY; Therapy: 65HOI0274 to ()  Requested for: 90PMS4082; Last   Rx:94Zvz1767 Ordered   5  DULoxetine HCl - 60 MG Oral Capsule Delayed Release Particles; TAKE ONE CAPSULE   BY MOUTH ONCE DAILY; Therapy: 60BDU0807 to (Last Rx:06Jun2017)  Requested for: 06Jun2017 Ordered   6  Eliquis 5 MG Oral Tablet; TAKE ONE TABLET BY MOUTH TWICE A DAY  Requested for:   39LMH4662; Last Rx:07Sto6964 Ordered   7  Isosorbide Mononitrate ER 30 MG Oral Tablet Extended Release 24 Hour; take 1 tablet   once daily; Therapy: 46HXN1901 to (Evaluate:49Whi0659)  Requested for: 27LJU9819; Last   Rx:27Jun2017 Ordered   8  Lidocaine 5 % External Patch; Therapy: 55AWS9300 to (Evaluate:06Jun2014) Recorded   9  Linzess 145 MCG Oral Capsule; take 1 capsule daily; Therapy: 03ZBK8734 to (Evaluate:17Jan2018)  Requested for: 21Bcm3945; Last   Rx:71Ncc1976 Ordered   10  LORazepam 2 MG Oral Tablet; TAKE 1 TABLET BY MOUTH 3 TIMES DAILY; Therapy: 23YTA8420 to (Evaluate:12Oct2017); Last Rx:75Nnj3219 Ordered   11  Morphine Sulfate ER 15 MG TB12;    Therapy: (Recorded:23Nov2016) to Recorded   12  Movantik 25 MG Oral Tablet; TAKE 1 TABLET Bedtime; Therapy: 03UPT8965 to (Evaluate:86Lvb8603)  Requested for: 20Bxk5934; Last    Rx:12Sep2017 Ordered   13  Mupirocin 2 % External Ointment; APPLY BID TO AFFECTED AREA;     Therapy: 76AMO9019 to (Evaluate:20Oct2016)  Requested for: 54LDW3720; Last    Rx:15Edq1729 Ordered   14  Nitrostat 0 4 MG Sublingual Tablet Sublingual; DISSOLVE 1 TABLET UNDER THE    TONGUE AS NEEDED FOR CHEST PAIN;    Therapy: 43HCL9213 to (Evaluate:38Nnt8107)  Requested for: 25RHZ3730; Last    Rx:17Jan2017 Ordered   15  Oxycodone-Acetaminophen  MG Oral Tablet; TAKE 1 TABLET 4 TIMES DAILY AS    NEEDED FOR PAIN;    Therapy: 15BTC6922 to (Evaluate:16Oct2016); Last Rx:32Qhv6941 Ordered   16  QUEtiapine Fumarate 50 MG Oral Tablet; TAKE ONE TABLET BY MOUTH AT BEDTIME; Therapy: 92PYU1290 to (Evaluate:11Mar2018)  Requested for: 09Xju3856; Last    Rx:62Hxy0938 Ordered   17  Spironolactone 50 MG Oral Tablet; TAKE 1 TABLET DAILY; Therapy: 98IPG9024 to (Evaluate:22Jan2018)  Requested for: 69Noi3144; Last    Rx:48Mog1444 Ordered   18  Torsemide 20 MG Oral Tablet; TAKE 2 IN AM AND 2 IN PM;    Therapy: 80OYE4303 to (Evaluate:28Jun2018)  Requested for: 92OTS4981; Last    Rx:19Qri5626 Ordered   19  Zolpidem Tartrate 10 MG Oral Tablet; TAKE 1 TABLET AT BEDTIME AS NEEDED FOR    SLEEP; Therapy: 92Hsa9605 to (Evaluate:84Dra6694)  Requested for: 11Aug2016; Last    Rx:11Aug2016 Ordered    Allergies    1  No Known Drug Allergies  Denied    2  Anesthesia Extension Tubing Miscellaneous    Discussion/Summary    Noted        Signatures   Electronically signed by : Maura Kruse MD; Oct  4 2017 10:50PM EST                       (Author)

## 2018-01-14 VITALS
OXYGEN SATURATION: 98 % | DIASTOLIC BLOOD PRESSURE: 58 MMHG | SYSTOLIC BLOOD PRESSURE: 100 MMHG | HEART RATE: 96 BPM | BODY MASS INDEX: 23.2 KG/M2 | TEMPERATURE: 98.5 F | WEIGHT: 186.6 LBS | HEIGHT: 75 IN

## 2018-01-14 VITALS
SYSTOLIC BLOOD PRESSURE: 110 MMHG | HEART RATE: 47 BPM | DIASTOLIC BLOOD PRESSURE: 60 MMHG | BODY MASS INDEX: 22.88 KG/M2 | HEIGHT: 75 IN | WEIGHT: 184 LBS

## 2018-01-14 VITALS
HEIGHT: 75 IN | WEIGHT: 179 LBS | DIASTOLIC BLOOD PRESSURE: 54 MMHG | BODY MASS INDEX: 22.26 KG/M2 | HEART RATE: 79 BPM | SYSTOLIC BLOOD PRESSURE: 110 MMHG

## 2018-01-14 VITALS
DIASTOLIC BLOOD PRESSURE: 80 MMHG | OXYGEN SATURATION: 98 % | WEIGHT: 177.2 LBS | TEMPERATURE: 97.7 F | HEART RATE: 74 BPM | BODY MASS INDEX: 22.03 KG/M2 | HEIGHT: 75 IN | SYSTOLIC BLOOD PRESSURE: 120 MMHG

## 2018-01-14 VITALS
HEIGHT: 75 IN | WEIGHT: 170 LBS | SYSTOLIC BLOOD PRESSURE: 104 MMHG | BODY MASS INDEX: 21.14 KG/M2 | HEART RATE: 76 BPM | DIASTOLIC BLOOD PRESSURE: 68 MMHG

## 2018-01-15 VITALS
DIASTOLIC BLOOD PRESSURE: 70 MMHG | SYSTOLIC BLOOD PRESSURE: 104 MMHG | HEART RATE: 53 BPM | BODY MASS INDEX: 22.5 KG/M2 | HEIGHT: 75 IN | WEIGHT: 181 LBS

## 2018-01-15 NOTE — MISCELLANEOUS
Message   Date: 03 Oct 2017 5:34 PM EST, Recorded By: Adelita Abraham For: Dimitry [facility],Team   Caller: sl fax   i received a fax stating pt was in the [facility] for groin pain  i called pt to set up followup er appt and he stated that there was no need its healing well  it was from his recent catherization  Active Problems    1  Allergic rhinitis due to pollen (477 0) (J30 1)   2  Anemia (285 9) (D64 9)   3  Anxiety (300 00) (F41 9)   4  Ascites (789 59) (R18 8)   5  ASCVD (arteriosclerotic cardiovascular disease) (429 2,440 9) (I25 10)   6  Atrial fibrillation (427 31) (I48 91)   7  Bilateral hand numbness (782 0) (R20 0)   8  Cervical spinal cord injury (952 00) (S14 109A)   9  Cervical spondylosis with myelopathy (721 1) (M47 12)   10  Chest pain (786 50) (R07 9)   11  Chronic combined systolic and diastolic CHF (congestive heart failure) (428 42,428 0)    (I50 42)   12  Chronic obstructive pulmonary disease (496) (J44 9)   13  Chronic pain disorder (338 4) (G89 4)   14  Constipation (564 00) (K59 00)   15  Depression (311) (F32 9)   16  Dyslipidemia (272 4) (E78 5)   17  Edema (782 3) (R60 9)   18  Edema of spinal cord (336 1) (G95 19)   19  Gait instability (781 2) (R26 81)   20  General weakness (780 79) (R53 1)   21  Hypertension (401 9) (I10)   22  Incoordination (781 3) (R27 9)   23  Insomnia (780 52) (G47 00)   24  Osteoarthritis of left shoulder (715 91) (M19 012)   25  Other constipation (564 09) (K59 09)   26  Panic attack (300 01) (F41 0)   27  Paresthesia of foot (782 0) (R20 2)   28  Physical debility (799 3) (R53 81)   29  Postnasal drip (784 91) (R09 82)   30  Sciatica of right side (724 3) (M54 31)   31  Status post cervical spinal fusion (V45 4) (Z98 1)   32  Stress due to family tension (V61 8) (Z63 8)   33  Urinary retention (788 20) (R33 9)   34  Weight loss (783 21) (R63 4)    Current Meds   1   Anoro Ellipta 62 5-25 MCG/INH Inhalation Aerosol Powder Breath Activated; INHALE 1   PUFFS Daily; Therapy: 43FUO9665 to (Evaluate:27Oct2016); Last Rx:20Oct2016 Ordered   2  Atorvastatin Calcium 40 MG Oral Tablet; TAKE 1 TABLET AT BEDTIME; Therapy: 92APH5123 to (Evaluate:21Jun2018)  Requested for: 26Jun2017; Last   ZO:01MLD4874 Ordered   3  BusPIRone HCl - 7 5 MG Oral Tablet; take 1 tablet every twelve hours; Therapy: 40WCZ3055 to (Evaluate:19Oct2017)  Requested for: 33Khf3178; Last   Rx:81Gzs8323 Ordered   4  Clopidogrel Bisulfate 75 MG Oral Tablet; TAKE ONE TABLET BY MOUTH ONCE DAILY; Therapy: 21HJQ4689 to [phone])  Requested for: 55BSD5238; Last   Rx:45Eye1463 Ordered   5  DULoxetine HCl - 60 MG Oral Capsule Delayed Release Particles; TAKE ONE CAPSULE   BY MOUTH ONCE DAILY; Therapy: 13PWA2134 to (Last Rx:06Jun2017)  Requested for: 06Jun2017 Ordered   6  Eliquis 5 MG Oral Tablet; TAKE ONE TABLET BY MOUTH TWICE A DAY  Requested for:   04DCK2962; Last Rx:82Vza3266 Ordered   7  Isosorbide Mononitrate ER 30 MG Oral Tablet Extended Release 24 Hour; take 1 tablet   once daily; Therapy: 28LVH9489 to (Evaluate:24Dec2017)  Requested for: 71TNN7843; Last   Rx:27Jun2017 Ordered   8  Lidocaine 5 % External Patch; Therapy: 74YYH4251 to (Evaluate:06Jun2014) Recorded   9  Linzess 145 MCG Oral Capsule; take 1 capsule daily; Therapy: 65NGJ2068 to (Evaluate:17Jan2018)  Requested for: 99Cvh6151; Last   Rx:02Wma9221 Ordered   10  LORazepam 2 MG Oral Tablet; TAKE 1 TABLET BY MOUTH 3 TIMES DAILY; Therapy: 24YBA2163 to (Evaluate:12Oct2017); Last Rx:82Ewk9939 Ordered   11  Morphine Sulfate ER 15 MG TB12;    Therapy: (Recorded:23Nov2016) to Recorded   12  Movantik 25 MG Oral Tablet; TAKE 1 TABLET Bedtime; Therapy: 92XZD1458 to (Evaluate:11Dec2017)  Requested for: 84Xlu2926; Last    Rx:65Xuh7824 Ordered   13  Mupirocin 2 % External Ointment; APPLY BID TO AFFECTED AREA;     Therapy: 88YQS8826 to ()  Requested for: 58KAJ0674; Last    Rx:20Sep2016 Ordered   14  Nitrostat 0 4 MG Sublingual Tablet Sublingual; DISSOLVE 1 TABLET UNDER THE    TONGUE AS NEEDED FOR CHEST PAIN;    Therapy: 85VOA0478 to (Evaluate:19Szb1541)  Requested for: 75RIG3618; Last    Rx:17Jan2017 Ordered   15  Oxycodone-Acetaminophen  MG Oral Tablet; TAKE 1 TABLET 4 TIMES DAILY AS    NEEDED FOR PAIN;    Therapy: 78QRJ9825 to (Evaluate:16Oct2016); Last Rx:11Oct2016 Ordered   16  QUEtiapine Fumarate 50 MG Oral Tablet; TAKE ONE TABLET BY MOUTH AT BEDTIME; Therapy: 62IKE4983 to (Evaluate:11Mar2018)  Requested for: 95Owu5835; Last    Rx:72Emw2502 Ordered   17  Spironolactone 50 MG Oral Tablet; TAKE 1 TABLET DAILY; Therapy: 03MBT9935 to (Evaluate:22Jan2018)  Requested for: 65Mqk5099; Last    Rx:78Ggu0144 Ordered   18  Torsemide 20 MG Oral Tablet; TAKE 2 IN AM AND 2 IN PM;    Therapy: 62RHE3170 to (Evaluate:28Jun2018)  Requested for: 43QGG0599; Last    Rx:86Vul8138 Ordered   19  Zolpidem Tartrate 10 MG Oral Tablet; TAKE 1 TABLET AT BEDTIME AS NEEDED FOR    SLEEP; Therapy: 22Yuf5841 to (Evaluate:17Frk3076)  Requested for: 11Aug2016; Last    Rx:11Aug2016 Ordered    Allergies    1  No Known Drug Allergies  Denied    2  Anesthesia Extension Tubing Miscellaneous    Discussion/Summary    NOted thanks        Signatures   Electronically signed by : Meredith Ingram MD; Oct  3 2017  8:40PM EST                       (Author)

## 2018-01-15 NOTE — MISCELLANEOUS
History of Present Illness  Communication  A message was left on voicemail requesting a return phone call  A call was made to the patient/patient's family  Future Appointments    Date/Time Provider Specialty Site   10/06/2016 01:20 PM SETH Holley  Cardiology [facility]   10/11/2016 11:20 AM SETH Salcedo   Family [facility]   Electronically signed by : Reid Villalba, ; Sep 30 2016  5:05PM EST                       (Author)
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
## 2018-01-15 NOTE — PROCEDURES
Procedures by Svetlana Parisi DO at  11/10/2016  3:18 PM      Author:  Svetlana Parisi DO Service:  Interventional Radiology  Author Type:  Physician     Filed:  11/10/2016  3:24 PM Date of Service:  11/10/2016  3:18 PM Status:  Signed     :  Svetlana Parisi DO (Physician)         Procedure Orders:       1  Paracentesis [[id number]] ordered by Svetlana Parisi DO at 11/10/16 1518                    Paracentesis  Date/Time: 11/10/2016 3:18 PM  Performed by: David Carmona by: Aguila Feliciano     Patient location:  Bedside  Other Assisting Provider:  No    Consent:     Consent obtained:  Written    Consent given by:  Patient    Risks discussed:  Bleeding, bowel perforation, infection and pain    Alternatives discussed:  No treatment and delayed treatment  Universal protocol:     Procedure explained and questions answered to patient or proxy's satisfaction: yes      Relevant documents present and verified: yes      Test results available and properly labeled: yes       Imaging studies available: yes      Required blood products, implants, devices and special equipment available: yes      Site/side marked: yes      Immediately prior to procedure a time out was called: yes      Patient identity confirmed:  Verbally with patient, arm band and hospital-assigned identification number  Pre-procedure details:     Initial or Subsequent Exam:  Initial    Procedure purpose:  Therapeutic    Indications: abdominal discomfort secondary to ascites, new onet ascites and respiratory distress secondary to ascites      Preparation: Patient was prepped and draped in usual  sterile fashion    Anesthesia (see MAR for exact dosages): Anesthesia method:  Local infiltration    Local anesthetic:  Lidocaine 1% w/o epi (5cc)  Procedure details:     Needle gauge: 5Fr Yueh      Equipment: Paracentesis kit used      Ultrasound guidance: yes      Approach:  Percutaneous    Puncture site:  R lower quadrant    Fluid removed amount:  8170    Fluid appearance:  Cloudy    Dressing:  Adhesive bandage (Tip Stop)  Post-procedure details:     Patient tolerance of procedure: Tolerated well, no immediate complications  Post-procedure medication (see MAR for dosing):      Albumin replacement: Yes (25 g)                   Received for:Nickolas Juan DO  Nov 10 2016  3:24PM [facility] Standard Time

## 2018-01-15 NOTE — RESULT NOTES
Message  Wound culture is negative  This was expected because of the wound being dry at the time and was taken  If improving patient should just finish medications   If not improving needs refills      Signatures   Electronically signed by : SETH Gonsalves ; Apr 5 2016 12:16PM EST                       (Author)

## 2018-01-15 NOTE — MISCELLANEOUS
Provider Comments  Provider Comments:   PT NO SHOW 10/24/2017      Signatures   Electronically signed by : Gabrielle Yanes NP; Oct 24 2017  4:42PM EST                       (Author)

## 2018-01-15 NOTE — RESULT NOTES
Message  Chest x-ray shows blunting of costophrenic vertebral angle suggesting small pleural effusion but no evidence of congestive heart failure  CBC continues to show mild anemia with a hemoglobin of 01 9, B 12 folic acid and iron levels are all normal  This suggests anemia of chronic illness  Metabolic profile shows basically normal potassium and other studies        Signatures   Electronically signed by : SETH Saldana ; Aug  9 2016  1:30PM EST                       (Author)

## 2018-01-15 NOTE — RESULT NOTES
Message  Patient was in for office visit today  It was noted that his metabolic profile showed an increase in his BUN/creatinine decrease in his GFR  He had already been contacted by cardiology to reduce the dose of diuretic that he is taking   I have asked him to repeat his metabolic profile in one week      Signatures   Electronically signed by : SETH Trivedi ; Sep 22 2016 12:13PM EST                       (Author)
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
## 2018-01-16 NOTE — MISCELLANEOUS
Message   Recorded as Task   Date: 08/28/2017 04:11 PM, Created By: Angel Norman   Task Name: Care Coordination   Assigned To: [facility],Team   Regarding Patient: Héctor Arora, Status: In Progress   Comment:    Wai Joe - 28 Aug 2017 4:11 PM     TASK CREATED  Caller: Self; Care Coordination; [phone] (Home)  RECEIVED NOTICE THAT ALIREZA IN [facility] ER FOR BACK PAIN  I CONTACTED PT - [facility] FOR APPT FOR Liv Irwin [phone]   Wai Joe - 28 Aug 2017 5:13 PM     TASK IN PROGRESS   Wai Joe - 29 Aug 2017 3:02 PM     TASK REASSIGNED: Previously Assigned To [address] [place] [address]  Wai Yu - 01 Sep 2017 3:06 PM     TASK REASSIGNED: Previously Assigned To Cisco Boyd - 01 Sep 2017 3:06 PM     TASK REASSIGNED: Previously Assigned To Tiara Sanabria - 05 Sep 2017 6:40 PM     TASK REPLIED TO: Previously Assigned To [address] [place] [address]                     pt notified feeling better doesn't feel he needs appt   Patient declined ER follow up      Active Problems    1  Allergic rhinitis due to pollen (477 0) (J30 1)   2  Anemia (285 9) (D64 9)   3  Anxiety (300 00) (F41 9)   4  Ascites (789 59) (R18 8)   5  ASCVD (arteriosclerotic cardiovascular disease) (429 2,440 9) (I25 10)   6  Atrial fibrillation (427 31) (I48 91)   7  Bilateral hand numbness (782 0) (R20 0)   8  Cervical spinal cord injury (952 00) (S14 109A)   9  Cervical spondylosis with myelopathy (721 1) (M47 12)   10  Chest pain (786 50) (R07 9)   11  Chronic combined systolic and diastolic CHF (congestive heart failure) (428 42,428 0)    (I50 42)   12  Chronic obstructive pulmonary disease (496) (J44 9)   13  Chronic pain disorder (338 4) (G89 4)   14  Constipation (564 00) (K59 00)   15  Depression (311) (F32 9)   16  Dyslipidemia (272 4) (E78 5)   17  Edema (782 3) (R60 9)   18  Edema of spinal cord (336 1) (G95 19)   19   Gait instability (781  2) (R26 81)   20  General weakness (780 79) (R53 1)   21  Hypertension (401 9) (I10)   22  Incoordination (781 3) (R27 9)   23  Insomnia (780 52) (G47 00)   24  Osteoarthritis of left shoulder (715 91) (M19 012)   25  Other constipation (564 09) (K59 09)   26  Panic attack (300 01) (F41 0)   27  Paresthesia of foot (782 0) (R20 2)   28  Physical debility (799 3) (R53 81)   29  Postnasal drip (784 91) (R09 82)   30  Sciatica of right side (724 3) (M54 31)   31  Status post cervical spinal fusion (V45 4) (Z98 1)   32  Stress due to family tension (V61 8) (Z63 8)   33  Urinary retention (788 20) (R33 9)   34  Weight loss (783 21) (R63 4)    Current Meds   1  Anoro Ellipta 62 5-25 MCG/INH Inhalation Aerosol Powder Breath Activated; INHALE 1   PUFFS Daily; Therapy: 26XMU2193 to (Evaluate:27Oct2016); Last Rx:20Oct2016 Ordered   2  Atorvastatin Calcium 40 MG Oral Tablet; TAKE 1 TABLET AT BEDTIME; Therapy: 33WFI9895 to (Evaluate:21Jun2018)  Requested for: 26Jun2017; Last   PE:12PYE5668 Ordered   3  BusPIRone HCl - 7 5 MG Oral Tablet; take 1 tablet every twelve hours; Therapy: 55ZAJ8403 to (Evaluate:19Oct2017)  Requested for: 61Fne0512; Last   Rx:26Prh9123 Ordered   4  Clopidogrel Bisulfate 75 MG Oral Tablet; TAKE ONE TABLET BY MOUTH ONCE DAILY; Therapy: 30UNE1820 to (51 414 10 55)  Requested for: 47EDX5794; Last   Rx:00Shl2593 Ordered   5  DULoxetine HCl - 60 MG Oral Capsule Delayed Release Particles; TAKE ONE CAPSULE   BY MOUTH ONCE DAILY; Therapy: 23YWR5023 to (Last Rx:06Jun2017)  Requested for: 06Jun2017 Ordered   6  Eliquis 5 MG Oral Tablet; TAKE ONE TABLET BY MOUTH TWICE A DAY  Requested for:   63AQD1711; Last Rx:03Feb2017 Ordered   7  Isosorbide Mononitrate ER 30 MG Oral Tablet Extended Release 24 Hour; take 1 tablet   once daily; Therapy: 09KVP2894 to (Evaluate:98Oer8758)  Requested for: 47HJM7790; Last   Rx:27Jun2017 Ordered   8  Lidocaine 5 % External Patch;    Therapy: 31WLR2250 to (Evaluate:06Jun2014) Recorded   9  Linzess 145 MCG Oral Capsule; take 1 capsule daily; Therapy: 97TIA1986 to (Evaluate:17Jan2018)  Requested for: 80Bxb0868; Last   Rx:78Cku8844 Ordered   10  LORazepam 2 MG Oral Tablet; TAKE 1 TABLET BY MOUTH 3 TIMES DAILY; Therapy: 72WPQ8149 to (Evaluate:96Blw7988); Last Rx:14Awj1755 Ordered   11  Morphine Sulfate ER 15 MG TB12;    Therapy: (Recorded:23Nov2016) to Recorded   12  Movantik 25 MG Oral Tablet; TAKE 1 TABLET Bedtime; Therapy: 49GUU5938 to (Jose Sofia)  Requested for: 69YIQ5409; Last    Rx:58Xsi0545 Ordered   13  Mupirocin 2 % External Ointment; APPLY BID TO AFFECTED AREA; Therapy: 59PNU4745 to (Evaluate:20Oct2016)  Requested for: 56TBH2908; Last    Rx:62Zhs3475 Ordered   14  Nitrostat 0 4 MG Sublingual Tablet Sublingual; DISSOLVE 1 TABLET UNDER THE    TONGUE AS NEEDED FOR CHEST PAIN;    Therapy: 73IHD6158 to (Evaluate:17Apr2017)  Requested for: 69EXT0204; Last    Rx:17Jan2017 Ordered   15  Oxycodone-Acetaminophen  MG Oral Tablet; TAKE 1 TABLET 4 TIMES DAILY AS    NEEDED FOR PAIN;    Therapy: 40LRT4151 to (Evaluate:16Oct2016); Last Rx:17Siu5392 Ordered   16  QUEtiapine Fumarate 50 MG Oral Tablet; TAKE ONE TABLET BY MOUTH AT BEDTIME; Therapy: 13ZQN3557 to (Evaluate:89Ypk7037)  Requested for: 08Luc0509; Last    Rx:02Ddz7813 Ordered   17  Spironolactone 50 MG Oral Tablet; TAKE 1 TABLET DAILY; Therapy: 19ZPW6406 to (Evaluate:22Jan2018)  Requested for: 27Okg5516; Last    Rx:59Won0833 Ordered   18  Torsemide 20 MG Oral Tablet; TAKE 2 IN AM AND 2 IN PM;    Therapy: 08AEI1738 to (Evaluate:28Jun2018)  Requested for: 65PDS2093; Last    Rx:85Gsz9429 Ordered   19  Zolpidem Tartrate 10 MG Oral Tablet; TAKE 1 TABLET AT BEDTIME AS NEEDED FOR    SLEEP; Therapy: 68Uvc2586 to (Evaluate:59Sxg7229)  Requested for: 11Aug2016; Last    Rx:11Aug2016 Ordered    Allergies    1  No Known Drug Allergies  Denied    2   Anesthesia Extension Tubing Miscellaneous    Discussion/Summary    Noted thanks        Signatures   Electronically signed by : Anabel Ayala MD; Sep  6 2017 12:59PM EST                       (Author)
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
## 2018-01-17 NOTE — MISCELLANEOUS
Message   Date: 12 Sep 2017 4:48 PM EST, Recorded By: Marlene Campbell For: Rudy ANNA,Team   Caller:  fax   Reason: Care Coordination   we received 3 faxes stating pt was in the [facility] er for anxiety ,sob, and chest pain  i called the patient - he does not need a follow up with us at this time as he is seeing cardio and also the physciatrist          Active Problems    1  Allergic rhinitis due to pollen (477 0) (J30 1)   2  Anemia (285 9) (D64 9)   3  Anxiety (300 00) (F41 9)   4  Ascites (789 59) (R18 8)   5  ASCVD (arteriosclerotic cardiovascular disease) (429 2,440 9) (I25 10)   6  Atrial fibrillation (427 31) (I48 91)   7  Bilateral hand numbness (782 0) (R20 0)   8  Cervical spinal cord injury (952 00) (S14 109A)   9  Cervical spondylosis with myelopathy (721 1) (M47 12)   10  Chest pain (786 50) (R07 9)   11  Chronic combined systolic and diastolic CHF (congestive heart failure) (428 42,428 0)    (I50 42)   12  Chronic obstructive pulmonary disease (496) (J44 9)   13  Chronic pain disorder (338 4) (G89 4)   14  Constipation (564 00) (K59 00)   15  Depression (311) (F32 9)   16  Dyslipidemia (272 4) (E78 5)   17  Edema (782 3) (R60 9)   18  Edema of spinal cord (336 1) (G95 19)   19  Gait instability (781 2) (R26 81)   20  General weakness (780 79) (R53 1)   21  Hypertension (401 9) (I10)   22  Incoordination (781 3) (R27 9)   23  Insomnia (780 52) (G47 00)   24  Osteoarthritis of left shoulder (715 91) (M19 012)   25  Other constipation (564 09) (K59 09)   26  Panic attack (300 01) (F41 0)   27  Paresthesia of foot (782 0) (R20 2)   28  Physical debility (799 3) (R53 81)   29  Postnasal drip (784 91) (R09 82)   30  Sciatica of right side (724 3) (M54 31)   31  Status post cervical spinal fusion (V45 4) (Z98 1)   32  Stress due to family tension (V61 8) (Z63 8)   33  Urinary retention (788 20) (R33 9)   34  Weight loss (783 21) (R63 4)    Current Meds   1   Anoro Ellipta 62 5-25 MCG/INH Inhalation Aerosol Powder Breath Activated; INHALE 1   PUFFS Daily; Therapy: 19YVR2357 to (Evaluate:27Oct2016); Last Rx:20Oct2016 Ordered   2  Atorvastatin Calcium 40 MG Oral Tablet; TAKE 1 TABLET AT BEDTIME; Therapy: 40EVD4607 to (Evaluate:21Jun2018)  Requested for: 26Jun2017; Last   EM:28EUF1384 Ordered   3  BusPIRone HCl - 7 5 MG Oral Tablet; take 1 tablet every twelve hours; Therapy: 76ZRK4021 to (Evaluate:19Oct2017)  Requested for: 50Osx2866; Last   Rx:86Irf4770 Ordered   4  Clopidogrel Bisulfate 75 MG Oral Tablet; TAKE ONE TABLET BY MOUTH ONCE DAILY; Therapy: 76UCY3147 to ([phone])  Requested for: 26IST4763; Last   Rx:96Rub9250 Ordered   5  DULoxetine HCl - 60 MG Oral Capsule Delayed Release Particles; TAKE ONE CAPSULE   BY MOUTH ONCE DAILY; Therapy: 34GNZ1799 to (Last Rx:06Jun2017)  Requested for: 06Jun2017 Ordered   6  Eliquis 5 MG Oral Tablet; TAKE ONE TABLET BY MOUTH TWICE A DAY  Requested for:   24XBB3066; Last Rx:14Mdl9858 Ordered   7  Isosorbide Mononitrate ER 30 MG Oral Tablet Extended Release 24 Hour; take 1 tablet   once daily; Therapy: 61CWC6004 to (Evaluate:18Hjg2684)  Requested for: 62VQG7604; Last   Rx:27Jun2017 Ordered   8  Lidocaine 5 % External Patch; Therapy: 59FKE5072 to (Evaluate:06Jun2014) Recorded   9  Linzess 145 MCG Oral Capsule; take 1 capsule daily; Therapy: 44CGA5056 to (Evaluate:17Jan2018)  Requested for: 37Jva8669; Last   Rx:64Gop8293 Ordered   10  LORazepam 2 MG Oral Tablet; TAKE 1 TABLET BY MOUTH 3 TIMES DAILY; Therapy: 30XIU3268 to (Evaluate:12Oct2017); Last Rx:53Uyd8443 Ordered   11  Morphine Sulfate ER 15 MG TB12;    Therapy: (Recorded:23Nov2016) to Recorded   12  Movantik 25 MG Oral Tablet; TAKE 1 TABLET Bedtime; Therapy: 60ZBH9471 to (Evaluate:08Utr0669)  Requested for: 45Ofb3327; Last    Rx:25Yam6023 Ordered   13  Mupirocin 2 % External Ointment; APPLY BID TO AFFECTED AREA;     Therapy: 66ZFZ6712 to (Evaluate:20Oct2016)  Requested for: 95PYF5589; Last    Rx:71Zpu5000 Ordered   14  Nitrostat 0 4 MG Sublingual Tablet Sublingual; DISSOLVE 1 TABLET UNDER THE    TONGUE AS NEEDED FOR CHEST PAIN;    Therapy: 14OOQ8289 to (Evaluate:46Big0819)  Requested for: 03VCV8649; Last    Rx:17Jan2017 Ordered   15  Oxycodone-Acetaminophen  MG Oral Tablet; TAKE 1 TABLET 4 TIMES DAILY AS    NEEDED FOR PAIN;    Therapy: 17SMS7005 to (Evaluate:16Oct2016); Last Rx:09Cve2997 Ordered   16  QUEtiapine Fumarate 50 MG Oral Tablet; TAKE ONE TABLET BY MOUTH AT BEDTIME; Therapy: 40NAO1351 to (Evaluate:11Mar2018)  Requested for: 64Ipr0127; Last    Rx:46Dgs2924 Ordered   17  Spironolactone 50 MG Oral Tablet; TAKE 1 TABLET DAILY; Therapy: 72FED9753 to (Evaluate:22Jan2018)  Requested for: 22Qpt2637; Last    Rx:46Ljz4743 Ordered   18  Torsemide 20 MG Oral Tablet; TAKE 2 IN AM AND 2 IN PM;    Therapy: 53JBN9194 to (Evaluate:28Jun2018)  Requested for: 35GXE2562; Last    Rx:80Bkm6243 Ordered   19  Zolpidem Tartrate 10 MG Oral Tablet; TAKE 1 TABLET AT BEDTIME AS NEEDED FOR    SLEEP; Therapy: 96Wrw9921 to (Evaluate:89Ipd4037)  Requested for: 11Aug2016; Last    Rx:11Aug2016 Ordered    Allergies    1  No Known Drug Allergies  Denied    2   Anesthesia Extension Tubing Miscellaneous    Signatures   Electronically signed by : Elissa Haas; Sep 12 2017  7:50PM EST                       (Author)

## 2018-01-22 VITALS
TEMPERATURE: 96 F | SYSTOLIC BLOOD PRESSURE: 124 MMHG | DIASTOLIC BLOOD PRESSURE: 59 MMHG | TEMPERATURE: 97.2 F | BODY MASS INDEX: 22.5 KG/M2 | BODY MASS INDEX: 23.92 KG/M2 | WEIGHT: 181 LBS | HEIGHT: 75 IN | HEART RATE: 56 BPM | WEIGHT: 192.38 LBS | HEIGHT: 75 IN | SYSTOLIC BLOOD PRESSURE: 126 MMHG | DIASTOLIC BLOOD PRESSURE: 78 MMHG | RESPIRATION RATE: 18 BRPM | OXYGEN SATURATION: 96 % | HEART RATE: 91 BPM

## 2018-02-02 ENCOUNTER — OFFICE VISIT (OUTPATIENT)
Dept: FAMILY MEDICINE CLINIC | Facility: CLINIC | Age: 68
End: 2018-02-02
Payer: MEDICARE

## 2018-02-02 VITALS
WEIGHT: 181 LBS | DIASTOLIC BLOOD PRESSURE: 70 MMHG | HEART RATE: 86 BPM | OXYGEN SATURATION: 96 % | BODY MASS INDEX: 22.5 KG/M2 | HEIGHT: 75 IN | SYSTOLIC BLOOD PRESSURE: 116 MMHG | TEMPERATURE: 98.3 F

## 2018-02-02 DIAGNOSIS — F51.01 PRIMARY INSOMNIA: ICD-10-CM

## 2018-02-02 DIAGNOSIS — M70.21 OLECRANON BURSITIS OF RIGHT ELBOW: Primary | ICD-10-CM

## 2018-02-02 DIAGNOSIS — F41.9 ANXIETY: ICD-10-CM

## 2018-02-02 PROBLEM — R00.1 BRADYCARDIA: Status: RESOLVED | Noted: 2017-06-29 | Resolved: 2018-02-02

## 2018-02-02 PROBLEM — L89.029: Status: ACTIVE | Noted: 2017-11-13

## 2018-02-02 PROCEDURE — 99213 OFFICE O/P EST LOW 20 MIN: CPT | Performed by: FAMILY MEDICINE

## 2018-02-02 NOTE — PROGRESS NOTES
Assessment/Plan:     Diagnoses and all orders for this visit:    Olecranon bursitis of right elbow  -     Ambulatory referral to Orthopedic Surgery; Future    Primary insomnia    Anxiety        Bursitis - referral to orthopedics  For the anxiety and insomnia, we discussed he is on multiple sedating medications  We discussed Beers criteria and medications that should be avoided in elderly patients  I agree with the physician at [address] [place] [address] who is weaning his lorazepam and would not make any changes at this time  Subjective:      Patient ID: Maryjane Escobedo is a 75 y o  male  75year old M here for R elbow swelling started about a week ago  He showed a doctor at "[facility] and was told to use cold compresses which has not helped  He is on Plavix and Eliquis so cannot take NSAIDs  He denies any pain  He also is asking about his Lorazepam   He reports that the doctor at "[facility] has been weaning his dose  He had been taking 2 mg of Lorazepam TID and was weaned to 1 mg TID and is now on 1 mg BID  He states the doctor told him this puts him at increased risk for falling  He states that at night time he feels like he is crawling up a wall  He takes Temazepam, Ambien and Seroquel at bedtime as well  He is asking if I would be able to get him back on the Lorazepam at bedtime  The following portions of the patient's history were reviewed and updated as appropriate: He  has a past medical history of Atrial fibrillation ([address] ); CAD (coronary artery disease); Cardiac disease; Cervicalgia; Chronic diastolic CHF (congestive heart failure) ([address] ); Chronic pain; COPD (chronic obstructive pulmonary disease) ([address] ); Hyperlipidemia; Hypertension; and Neuropathy  He  does not have any pertinent problems on file  He  has a past surgical history that includes Cardiac surgery; Coronary artery bypass graft (1983); Cervical fusion (10/21/2015); and Appendectomy    His family history includes Heart failure in his mother; Sudden death in his father  He  reports that he quit smoking about 36 years ago  He has never used smokeless tobacco  He reports that he does not drink alcohol or use drugs  Current Outpatient Prescriptions   Medication Sig Dispense Refill    apixaban (ELIQUIS) 5 mg Take 5 mg by mouth 2 (two) times a day        aspirin 81 mg chewable tablet Chew 1 tablet daily (Patient taking differently: Chew 81 mg daily  )  0    atorvastatin (LIPITOR) 40 mg tablet Take 40 mg by mouth daily        budesonide-formoterol (SYMBICORT) 160-4 5 mcg/act inhaler Inhale 2 puffs 2 (two) times a day        clopidogrel (PLAVIX) 75 mg tablet Take 75 mg by mouth daily        docusate sodium (COLACE) 50 mg capsule Take 2 capsules by mouth 2 (two) times a day 10 capsule 0    DULoxetine (CYMBALTA) 60 mg delayed release capsule Take 60 mg by mouth daily        isosorbide mononitrate (IMDUR) 30 mg 24 hr tablet Take 30 mg by mouth daily        Lidocaine-Menthol 4-5 % PTCH Apply 1 patch topically        Linaclotide (LINZESS) 145 MCG CAPS Take 145 mcg by mouth daily        LORazepam (ATIVAN) 1 mg tablet Take 1 mg by mouth every 8 (eight) hours as needed for anxiety        nitroglycerin (NITROSTAT) 0 4 mg SL tablet Place 0 4 mg under the tongue every 5 (five) minutes as needed for chest pain        oxyCODONE (ROXICODONE) 15 mg immediate release tablet Take 5 mg by mouth every 6 (six) hours as needed for moderate pain        QUEtiapine (SEROquel) 50 mg tablet Take 50 mg by mouth daily at bedtime        torsemide (DEMADEX) 20 mg tablet Take 40 mg by mouth 2 (two) times a day        naloxegol oxalate (MOVANTIK) 25 MG tablet Take 25 mg by mouth daily      QUEtiapine (SEROquel) 50 mg tablet Take 100 mg by mouth daily at bedtime        simethicone (MYLICON) 80 mg chewable tablet Chew 1 tablet every 6 (six) hours as needed for flatulence 30 tablet 0    spironolactone (ALDACTONE) 50 mg tablet Take 1 tablet by mouth 2 (two) times a day for 30 days (Patient taking differently: Take 50 mg by mouth 2 (two) times a day  ) 60 tablet 0    zolpidem (AMBIEN) 10 mg tablet Take 10 mg by mouth daily at bedtime as needed for sleep         No current facility-administered medications for this visit  Current Outpatient Prescriptions on File Prior to Visit   Medication Sig    apixaban (ELIQUIS) 5 mg Take 5 mg by mouth 2 (two) times a day      aspirin 81 mg chewable tablet Chew 1 tablet daily (Patient taking differently: Chew 81 mg daily  )    atorvastatin (LIPITOR) 40 mg tablet Take 40 mg by mouth daily      budesonide-formoterol (SYMBICORT) 160-4 5 mcg/act inhaler Inhale 2 puffs 2 (two) times a day      clopidogrel (PLAVIX) 75 mg tablet Take 75 mg by mouth daily      docusate sodium (COLACE) 50 mg capsule Take 2 capsules by mouth 2 (two) times a day    DULoxetine (CYMBALTA) 60 mg delayed release capsule Take 60 mg by mouth daily      isosorbide mononitrate (IMDUR) 30 mg 24 hr tablet Take 30 mg by mouth daily      Lidocaine-Menthol 4-5 % PTCH Apply 1 patch topically      Linaclotide (LINZESS) 145 MCG CAPS Take 145 mcg by mouth daily      LORazepam (ATIVAN) 1 mg tablet Take 1 mg by mouth every 8 (eight) hours as needed for anxiety      nitroglycerin (NITROSTAT) 0 4 mg SL tablet Place 0 4 mg under the tongue every 5 (five) minutes as needed for chest pain      oxyCODONE (ROXICODONE) 15 mg immediate release tablet Take 5 mg by mouth every 6 (six) hours as needed for moderate pain      QUEtiapine (SEROquel) 50 mg tablet Take 50 mg by mouth daily at bedtime      torsemide (DEMADEX) 20 mg tablet Take 40 mg by mouth 2 (two) times a day      naloxegol oxalate (MOVANTIK) 25 MG tablet Take 25 mg by mouth daily    QUEtiapine (SEROquel) 50 mg tablet Take 100 mg by mouth daily at bedtime      simethicone (MYLICON) 80 mg chewable tablet Chew 1 tablet every 6 (six) hours as needed for flatulence    spironolactone (ALDACTONE) 50 mg tablet Take 1 tablet by mouth 2 (two) times a day for 30 days (Patient taking differently: Take 50 mg by mouth 2 (two) times a day  )    zolpidem (AMBIEN) 10 mg tablet Take 10 mg by mouth daily at bedtime as needed for sleep       No current facility-administered medications on file prior to visit  He has No Known Allergies       Review of Systems   Constitutional: Negative  Musculoskeletal: Positive for joint swelling  Psychiatric/Behavioral: Positive for agitation  The patient is nervous/anxious  Objective:     Physical Exam   Constitutional: He is active  Appears older   Musculoskeletal:        Right elbow: He exhibits swelling and effusion  Tenderness found  Neurological: He is alert  Nursing note and vitals reviewed

## 2018-03-02 ENCOUNTER — OFFICE VISIT (OUTPATIENT)
Dept: OBGYN CLINIC | Facility: CLINIC | Age: 68
End: 2018-03-02
Payer: MEDICARE

## 2018-03-02 ENCOUNTER — APPOINTMENT (OUTPATIENT)
Dept: RADIOLOGY | Facility: CLINIC | Age: 68
End: 2018-03-02
Payer: MEDICARE

## 2018-03-02 VITALS
WEIGHT: 181 LBS | HEIGHT: 75 IN | BODY MASS INDEX: 22.5 KG/M2 | DIASTOLIC BLOOD PRESSURE: 72 MMHG | HEART RATE: 76 BPM | SYSTOLIC BLOOD PRESSURE: 107 MMHG

## 2018-03-02 DIAGNOSIS — M70.21 OLECRANON BURSITIS OF RIGHT ELBOW: Primary | ICD-10-CM

## 2018-03-02 DIAGNOSIS — M19.012 PRIMARY OSTEOARTHRITIS OF LEFT SHOULDER: ICD-10-CM

## 2018-03-02 PROCEDURE — 20605 DRAIN/INJ JOINT/BURSA W/O US: CPT | Performed by: FAMILY MEDICINE

## 2018-03-02 PROCEDURE — 99214 OFFICE O/P EST MOD 30 MIN: CPT | Performed by: FAMILY MEDICINE

## 2018-03-02 PROCEDURE — 73080 X-RAY EXAM OF ELBOW: CPT

## 2018-03-02 RX ADMIN — LIDOCAINE HYDROCHLORIDE 1 ML: 10 INJECTION, SOLUTION INFILTRATION; PERINEURAL at 11:15

## 2018-03-02 RX ADMIN — TRIAMCINOLONE ACETONIDE 40 MG: 40 INJECTION, SUSPENSION INTRA-ARTICULAR; INTRAMUSCULAR at 11:15

## 2018-03-02 NOTE — PATIENT INSTRUCTIONS
Elbow Bursitis   WHAT YOU NEED TO KNOW:   What is elbow bursitis? Elbow bursitis is inflammation of the bursa in your elbow  The bursa is a fluid-filled sac that acts as a cushion between a bone and a tendon  A tendon is a cord of strong tissue that connects muscles to bones  The bursa is located right under the point of your elbow  What causes elbow bursitis? · An injury, such as a fall    · Overuse of your elbow, such as when you play tennis, vacuum, or swing a hammer    · Pressure on your elbows, such as when you lean on your elbows    · Bacterial infection    · Medical conditions, such as rheumatoid arthritis or gout  What are the signs and symptoms of elbow bursitis? · Pain or tenderness when you move your elbow    · Redness or swelling on or around the point of your elbow    · Decreased movement of your elbow    · Warmth of the skin over your elbow  How is elbow bursitis diagnosed? Your healthcare provider will examine your elbow and ask about your injury or activities  You may need any of the following:  · Blood tests:  Your blood is tested for signs of infection  Healthcare providers may also check for diseases that may be causing your bursitis, such as rheumatoid arthritis  · X-rays: These pictures will show bone position problems, arthritis, or a fracture  · MRI:  This scan uses powerful magnets and a computer to take pictures of your elbow  An MRI may show tissue damage or arthritis  You may be given dye to help the pictures show up better  Tell the healthcare provider if you have ever had an allergic reaction to contrast dye  Do not enter the MRI room with anything metal  Metal can cause serious injury  Tell the healthcare provider if you have any metal in or on your body  · Fluid culture:  Healthcare providers use a needle to drain fluid from your bursa  The fluid will be sent to a lab and tested for infection  Removal of bursa fluid may also help relieve your symptoms    How is elbow bursitis treated? · Medicine:      ¨ NSAIDs:  These medicines decrease swelling, pain, and fever  NSAIDs are available without a doctor's order  Ask your healthcare provider which medicine is right for you  Ask how much to take and when to take it  Take as directed  NSAIDs can cause stomach bleeding and kidney problems if not taken correctly  ¨ Antibiotics: These help fight an infection caused by bacteria  You may need antibiotics if your bursitis is caused by infection  ¨ Steroid injection: This shot will help decrease pain and swelling  · Surgery: You may need surgery to remove your bursa or part of your elbow bone  Surgery is only done when other treatments do not work  What are the risks of elbow bursitis? The infection may spread to nearby joints  You may develop long-term bursitis  This may include pain and severe limitation of movement  How can I manage my symptoms? · Rest:  Rest your elbow as much as possible to decrease pain and swelling  Slowly start to do more each day  Return to your daily activities as directed  · Ice:  Ice helps decrease swelling and pain  Ice may also help prevent tissue damage  Use an ice pack, or put crushed ice in a plastic bag  Cover it with a towel and place it on your elbow for 15 to 20 minutes, 3 to 4 times each day, as directed  · Compress:  Healthcare providers may wrap your arm with tape or an elastic bandage to decrease swelling  Loosen the elastic bandage if you start to lose feeling in your fingers  · Elevate:  Raise your elbow above the level of your heart as often as you can  This will help decrease swelling and pain  Prop your elbow on pillows or blankets to keep it elevated comfortably  · Physical therapy:  A physical therapist teaches you exercises to help improve movement and strength, and to decrease pain  How can I prevent elbow bursitis? · Avoid injury and pressure to your elbows:   Wear elbow pads or protectors when you play sports  Do not lean on your elbows or clench your fists  Do not tightly  small items, such as tools or pens  · Stretch, warm up, and cool down:  Always stretch and do warmup and cool-down exercises before and after you exercise  This will help loosen your muscles and decrease stress on your elbows  Rest between workouts  When should I contact my healthcare provider? · Your pain and swelling increase  · Your symptoms do not improve after 10 days of treatment  · You have a fever  · You have questions or concerns about your condition or care  CARE AGREEMENT:   You have the right to help plan your care  Learn about your health condition and how it may be treated  Discuss treatment options with your caregivers to decide what care you want to receive  You always have the right to refuse treatment  The above information is an  only  It is not intended as medical advice for individual conditions or treatments  Talk to your doctor, nurse or pharmacist before following any medical regimen to see if it is safe and effective for you  © 2017 [employer] Information is for End User's use only and may not be sold, redistributed or otherwise used for commercial purposes  All illustrations and images included in CareNotes® are the copyrighted property of [employer]  or J[employer] Perales

## 2018-03-02 NOTE — PROGRESS NOTES
Assessment/Plan:  Assessment/Plan   Diagnoses and all orders for this visit:    Olecranon bursitis of right elbow  -     Ambulatory referral to Orthopedic Surgery  -     XR elbow 3+ vw right  -     Medium joint arthrocentesis    Primary osteoarthritis of left shoulder  -     MRI shoulder left wo contrast; Future  -     Ambulatory referral to Orthopedic Surgery; Future    Other orders  -     Cancel: Hand/upper extremity injection      75year old male with right elbow pain and swelling and left shoulder pain  Discussed with patient physical exam, radiographs, impression, and plan  His right elbow swelling is likely due to olecranon bursal swelling  I discussed with patient that if pain is not significant he can continue with cold massage and observation, however patient states he would like to try method to help with the swelling more immediately  I discussed of tin of corticosteroid injection to which he agreed  I administered mixture of 0 5 cc 1% Lidocaine and 0 5 cc kenalog to the olecranon bursa without complication  He is to continue with cold massage  In regards to his left shoulder, he has significant limited range of motion  His X-rays also demonstrate significant glenohumeral arthritis  He has had corticosteroid injection of the shoulder and he has been doing physical therapy for the past 4 months without any improvement of the shoulder pain or range of motion  I discussed with him option consultation with orthopedic surgeon for further evaluation and discussion and surgical treatment options  I will refer him for MRI of the left shoulder to evaluate for the status of his rotator cuffs and further visualization of the left shoulder  I have referred him to orthopedic surgeon  He will follow-up with Dr Denisse Bartlett after getting MRI done  Subjective:   Patient ID: Fabrizio Johnson is a 75 y o  male    Chief Complaint   Patient presents with    Right Elbow - Pain, Swelling       75year old right hand dominant male presents for evaluation of right elbow swelling of more than one month duration  He denies any pain of the elbow  He denies any trauma or inciting event  He saw his PCP one month ago and impression was olecranon bursitis  Due to being on Plavix and Eliquis is not able to take NSAIDs  He also reports left shoulder pain of more than one year duration  Pain is described as localized to the generalized shoulder, nonradiating, worse with movement, associated with crepitus and limited range of motion  He's had corticosteroid injection of the shoulder and has been doing physical therapy for the past 4 months without any improvement  Elbow Pain   This is a new problem  The current episode started more than 1 month ago  The problem occurs constantly  The problem has been unchanged  Associated symptoms include joint swelling  Pertinent negatives include no abdominal pain, chest pain, chills, fever, numbness, rash, sore throat or weakness  Nothing aggravates the symptoms  He has tried nothing for the symptoms  The treatment provided no relief  Shoulder Pain   This is a chronic problem  The current episode started more than 1 year ago  The problem occurs constantly  The problem has been gradually worsening  Associated symptoms include joint swelling  Pertinent negatives include no abdominal pain, chest pain, chills, fever, numbness, rash, sore throat or weakness  Exacerbated by: Arm movement  Treatments tried: Corticosteroid injection, physical therapy  The treatment provided no relief  The following portions of the patient's history were reviewed and updated as appropriate: He  has a past medical history of Atrial fibrillation ([address] ); CAD (coronary artery disease); Cardiac disease; Cervicalgia; Chronic diastolic CHF (congestive heart failure) ([address] ); Chronic pain; COPD (chronic obstructive pulmonary disease) ([address] ); Hyperlipidemia; Hypertension; and Neuropathy    He  has a past surgical history that includes Cardiac surgery; Coronary artery bypass graft (0215); Cervical fusion (10/21/2015); and Appendectomy  His family history includes Heart failure in his mother; Sudden death in his father  He  reports that he quit smoking about 36 years ago  He has never used smokeless tobacco  He reports that he does not drink alcohol or use drugs  He has No Known Allergies       Review of Systems   Constitutional: Negative for chills and fever  HENT: Negative for sore throat  Eyes: Negative for visual disturbance  Respiratory: Negative for shortness of breath  Cardiovascular: Negative for chest pain  Gastrointestinal: Negative for abdominal pain  Genitourinary: Negative for difficulty urinating  Musculoskeletal: Positive for joint swelling  Skin: Negative for rash and wound  Neurological: Negative for weakness and numbness  Hematological: Bruises/bleeds easily  Psychiatric/Behavioral: Negative for self-injury  Objective:  Vitals:    03/02/18 1017   BP: 107/72   Pulse: 76   Weight: 82 1 kg (181 lb)   Height: 6' 3" (1 905 m)     Right Elbow Exam     Tenderness   The patient is experiencing no tenderness  Range of Motion   The patient has normal right elbow ROM  Muscle Strength   The patient has normal right elbow strength  Tests Varus: negative  Valgus: negative        Comments:  Olecranon swelling and mild redness  Mildly tender to paplation      Left Shoulder Exam     Tenderness   The patient is experiencing tenderness in the clavicle and acromioclavicular joint (Subacromial)  Range of Motion   Active Abduction: 70   Passive Abduction: 80   Forward Flexion: 70   Left shoulder internal rotation 0 degrees: Lateral hip  Muscle Strength   Abduction: 4/5   Internal Rotation: 4/5   External Rotation: 4/5   Supraspinatus: 4/5   Subscapularis: 4/5   Biceps: 5/5     Other   Pulse: present             Physical Exam   Constitutional: He is oriented to person, place, and time  He appears well-developed  No distress  HENT:   Head: Normocephalic and atraumatic  Eyes: Conjunctivae are normal    Neck: No tracheal deviation present  Cardiovascular: Normal rate  Pulmonary/Chest: Effort normal  No respiratory distress  Abdominal: He exhibits no distension  Neurological: He is alert and oriented to person, place, and time  Skin: Skin is warm and dry  Psychiatric: He has a normal mood and affect  His behavior is normal        I have personally reviewed pertinent films in PACS and my interpretation is No acute osseous abnormality of right elbow  Significant glenohumeral arthritis of the left shoulder      Medium joint arthrocentesis  Date/Time: 3/2/2018 11:15 AM  Consent given by: patient  Site marked: site marked  Timeout: Immediately prior to procedure a time out was called to verify the correct patient, procedure, equipment, support staff and site/side marked as required   Supporting Documentation  Indications: joint swelling   Procedure Details  Location: elbow - R olecranon bursa  Preparation: Patient was prepped and draped in the usual sterile fashion  Needle size: 22 G  Ultrasound guidance: no  Approach: dorsal  Medications administered: 1 mL lidocaine 1 %; 40 mg triamcinolone acetonide 40 mg/mL    Patient tolerance: patient tolerated the procedure well with no immediate complications  Dressing:  Sterile dressing applied

## 2018-03-05 RX ORDER — TRIAMCINOLONE ACETONIDE 40 MG/ML
40 INJECTION, SUSPENSION INTRA-ARTICULAR; INTRAMUSCULAR
Status: COMPLETED | OUTPATIENT
Start: 2018-03-02 | End: 2018-03-02

## 2018-03-05 RX ORDER — LIDOCAINE HYDROCHLORIDE 10 MG/ML
1 INJECTION, SOLUTION INFILTRATION; PERINEURAL
Status: COMPLETED | OUTPATIENT
Start: 2018-03-02 | End: 2018-03-02

## 2018-04-04 ENCOUNTER — TELEPHONE (OUTPATIENT)
Dept: OBGYN CLINIC | Facility: HOSPITAL | Age: 68
End: 2018-04-04

## 2018-04-04 NOTE — TELEPHONE ENCOUNTER
Patient is calling he is going to get the mri done before seeing Mirian Steele but he needs something for nerves before hand because he says he has clausterphobia      Reading pt

## 2018-04-05 ENCOUNTER — TELEPHONE (OUTPATIENT)
Dept: OBGYN CLINIC | Facility: CLINIC | Age: 68
End: 2018-04-05

## 2018-04-05 NOTE — TELEPHONE ENCOUNTER
Call placed to patient's home number and woman who answered the phone stated he no longer lives there   Call placed to mobile number and message states "the person you have dialed is not receiving calls at this time"

## 2018-04-11 ENCOUNTER — TRANSCRIBE ORDERS (OUTPATIENT)
Dept: ADMINISTRATIVE | Facility: HOSPITAL | Age: 68
End: 2018-04-11

## 2018-04-11 DIAGNOSIS — S93.104A DISLOCATION OF PHALANX OF RIGHT FOOT, INITIAL ENCOUNTER: Primary | ICD-10-CM

## 2018-04-30 ENCOUNTER — HOSPITAL ENCOUNTER (OUTPATIENT)
Dept: MRI IMAGING | Facility: HOSPITAL | Age: 68
Discharge: HOME | End: 2018-04-30
Attending: PODIATRIST
Payer: MEDICARE

## 2018-04-30 DIAGNOSIS — S93.104A DISLOCATION OF PHALANX OF RIGHT FOOT, INITIAL ENCOUNTER: ICD-10-CM

## 2018-04-30 PROCEDURE — 73718 MRI LOWER EXTREMITY W/O DYE: CPT

## 2018-05-03 ENCOUNTER — TELEPHONE (OUTPATIENT)
Dept: GASTROENTEROLOGY | Facility: CLINIC | Age: 68
End: 2018-05-03

## 2018-05-03 NOTE — TELEPHONE ENCOUNTER
Pt last saw dr Sofia in 2017, he called in requesting to see Dr Richi Reich due to the location   [facility]

## 2018-05-23 ENCOUNTER — TELEPHONE (OUTPATIENT)
Dept: OBGYN CLINIC | Facility: HOSPITAL | Age: 68
End: 2018-05-23

## 2018-05-23 NOTE — TELEPHONE ENCOUNTER
Patient is calling   [phone]  Patient sees Dr Mendel Estes    Patient is having an mri done on 5/31/18 but patient is stating that he has claustrophobia & would like a script sent for a med for anxiety     Patient is in a skilled nursing home & needs the script faxed to [address] [place] [address]: Stepan Pearl, Supervising Nurse & the fax # is [phone]

## 2018-05-23 NOTE — PRE-PROCEDURE INSTRUCTIONS
Pre-Surgery Instructions:   Medication Instructions    apixaban (ELIQUIS) 5 mg Patient was instructed by Physician and understands   aspirin 81 mg chewable tablet Patient was instructed by Physician and understands   atorvastatin (LIPITOR) 40 mg tablet Patient was instructed by Physician and understands   budesonide-formoterol (SYMBICORT) 160-4 5 mcg/act inhaler Patient was instructed by Physician and understands   clopidogrel (PLAVIX) 75 mg tablet Patient was instructed by Physician and understands   docusate sodium (COLACE) 50 mg capsule Patient was instructed by Physician and understands   DULoxetine (CYMBALTA) 60 mg delayed release capsule Patient was instructed by Physician and understands   isosorbide mononitrate (IMDUR) 30 mg 24 hr tablet Patient was instructed by Physician and understands   Lidocaine-Menthol 4-5 % PTCH Patient was instructed by Physician and understands   Linaclotide (LINZESS) 145 MCG CAPS Patient was instructed by Physician and understands   QUEtiapine (SEROquel) 50 mg tablet Patient was instructed by Physician and understands   simethicone (MYLICON) 80 mg chewable tablet Patient was instructed by Physician and understands   torsemide (DEMADEX) 20 mg tablet Patient was instructed by Physician and understands

## 2018-05-24 DIAGNOSIS — F40.240 CLAUSTROPHOBIA: Primary | ICD-10-CM

## 2018-05-24 RX ORDER — ALPRAZOLAM 1 MG/1
1 TABLET ORAL
Qty: 1 TABLET | Refills: 0 | Status: SHIPPED | OUTPATIENT
Start: 2018-05-24 | End: 2018-07-11

## 2018-05-25 NOTE — TELEPHONE ENCOUNTER
I spoke with Marianne Gtz and she received a faxed RX for the Xanax  Patient takes the [facility] door to door  I told them this should be fine for patient to take prior to his procedure as long as his ride is door to door due to sedation

## 2018-05-31 ENCOUNTER — HOSPITAL ENCOUNTER (OUTPATIENT)
Dept: MRI IMAGING | Facility: HOSPITAL | Age: 68
Discharge: HOME | End: 2018-05-31
Payer: MEDICARE

## 2018-05-31 DIAGNOSIS — M19.012 PRIMARY OSTEOARTHRITIS OF LEFT SHOULDER: ICD-10-CM

## 2018-05-31 PROCEDURE — 73221 MRI JOINT UPR EXTREM W/O DYE: CPT

## 2018-05-31 RX ORDER — CARBAMAZEPINE 100 MG/1
100 TABLET, CHEWABLE ORAL 2 TIMES DAILY
COMMUNITY
Start: 2018-05-21 | End: 2019-01-28 | Stop reason: ALTCHOICE

## 2018-05-31 RX ORDER — IBUPROFEN 400 MG/1
TABLET ORAL
Status: ON HOLD | COMMUNITY
Start: 2018-05-02 | End: 2018-06-21

## 2018-05-31 RX ORDER — OSELTAMIVIR PHOSPHATE 75 MG/1
CAPSULE ORAL
COMMUNITY
Start: 2018-04-12 | End: 2018-07-11

## 2018-05-31 RX ORDER — LISINOPRIL 20 MG/1
TABLET ORAL
COMMUNITY
End: 2018-07-11

## 2018-05-31 RX ORDER — DILTIAZEM HYDROCHLORIDE 180 MG/1
CAPSULE, EXTENDED RELEASE ORAL
COMMUNITY
End: 2018-07-11

## 2018-05-31 RX ORDER — PREDNISONE 10 MG/1
TABLET ORAL
Status: ON HOLD | COMMUNITY
End: 2018-06-21

## 2018-05-31 RX ORDER — BUDESONIDE AND FORMOTEROL FUMARATE DIHYDRATE 160; 4.5 UG/1; UG/1
AEROSOL RESPIRATORY (INHALATION)
COMMUNITY
End: 2018-05-31

## 2018-05-31 RX ORDER — MUPIROCIN CALCIUM 20 MG/G
CREAM TOPICAL
COMMUNITY
Start: 2018-05-06 | End: 2018-07-11

## 2018-05-31 RX ORDER — ALBUTEROL SULFATE 2.5 MG/3ML
SOLUTION RESPIRATORY (INHALATION)
COMMUNITY
End: 2018-07-11

## 2018-05-31 RX ORDER — AZITHROMYCIN 250 MG/1
TABLET, FILM COATED ORAL
COMMUNITY
End: 2018-07-11

## 2018-05-31 RX ORDER — POTASSIUM CHLORIDE 20 MEQ/1
TABLET, EXTENDED RELEASE ORAL
COMMUNITY
End: 2018-07-11

## 2018-05-31 RX ORDER — CLINDAMYCIN HYDROCHLORIDE 300 MG/1
CAPSULE ORAL
Status: ON HOLD | COMMUNITY
End: 2018-06-21

## 2018-05-31 RX ORDER — TIOTROPIUM BROMIDE 18 UG/1
CAPSULE ORAL; RESPIRATORY (INHALATION)
COMMUNITY
Start: 2018-05-21

## 2018-05-31 RX ORDER — HYDROCHLOROTHIAZIDE 50 MG/1
TABLET ORAL
COMMUNITY
End: 2018-07-11

## 2018-05-31 RX ORDER — LEVOFLOXACIN 500 MG/1
TABLET, FILM COATED ORAL
Status: ON HOLD | COMMUNITY
End: 2018-06-21

## 2018-05-31 RX ORDER — DIAZEPAM 2 MG/1
TABLET ORAL
COMMUNITY
End: 2018-07-11

## 2018-05-31 RX ORDER — CIPROFLOXACIN 500 MG/1
TABLET, FILM COATED ORAL
Status: ON HOLD | COMMUNITY
End: 2018-06-21

## 2018-05-31 RX ORDER — FLUTICASONE PROPIONATE 50 MCG
SPRAY, SUSPENSION (ML) NASAL
COMMUNITY
End: 2018-07-11

## 2018-05-31 RX ORDER — METOLAZONE 2.5 MG/1
TABLET ORAL
COMMUNITY
End: 2018-07-11

## 2018-05-31 RX ORDER — TAMSULOSIN HYDROCHLORIDE 0.4 MG/1
CAPSULE ORAL
COMMUNITY
End: 2018-07-11

## 2018-05-31 RX ORDER — PANTOPRAZOLE SODIUM 40 MG/1
TABLET, DELAYED RELEASE ORAL
Status: ON HOLD | COMMUNITY
End: 2018-06-21

## 2018-05-31 RX ORDER — BENZONATATE 200 MG/1
CAPSULE ORAL
COMMUNITY
End: 2018-07-11

## 2018-05-31 RX ORDER — CEPHALEXIN 500 MG/1
1 TABLET ORAL 3 TIMES DAILY
COMMUNITY
Start: 2017-11-13 | End: 2018-07-11

## 2018-05-31 RX ORDER — GABAPENTIN 300 MG/1
300 CAPSULE ORAL 2 TIMES DAILY
COMMUNITY
Start: 2018-05-21 | End: 2019-01-28 | Stop reason: SDUPTHER

## 2018-05-31 RX ORDER — BUSPIRONE HYDROCHLORIDE 10 MG/1
TABLET ORAL
Status: ON HOLD | COMMUNITY
End: 2018-06-21

## 2018-05-31 RX ORDER — TEMAZEPAM 15 MG/1
CAPSULE ORAL
COMMUNITY
Start: 2018-03-08 | End: 2018-07-11

## 2018-05-31 RX ORDER — LIDOCAINE 50 MG/G
PATCH TOPICAL
COMMUNITY
Start: 2014-01-29 | End: 2018-05-31

## 2018-05-31 RX ORDER — LORAZEPAM 0.5 MG/1
TABLET ORAL
COMMUNITY
Start: 2018-03-13 | End: 2018-07-11

## 2018-05-31 RX ORDER — FUROSEMIDE 20 MG/1
TABLET ORAL
Status: ON HOLD | COMMUNITY
End: 2018-06-21

## 2018-05-31 RX ORDER — TRAZODONE HYDROCHLORIDE 50 MG/1
50 TABLET ORAL
COMMUNITY
Start: 2018-05-15 | End: 2019-02-13 | Stop reason: SDUPTHER

## 2018-05-31 RX ORDER — FUROSEMIDE 80 MG
TABLET ORAL
Status: ON HOLD | COMMUNITY
End: 2018-06-21

## 2018-05-31 RX ORDER — LOPERAMIDE HYDROCHLORIDE 2 MG/1
1 TABLET ORAL DAILY PRN
COMMUNITY
Start: 2017-11-06 | End: 2018-07-11

## 2018-05-31 RX ORDER — MONTELUKAST SODIUM 10 MG/1
TABLET ORAL
COMMUNITY
End: 2018-07-11

## 2018-05-31 RX ORDER — AMMONIUM LACTATE 12 G/100G
LOTION TOPICAL
COMMUNITY
Start: 2018-05-07 | End: 2018-07-11

## 2018-05-31 RX ORDER — UMECLIDINIUM BROMIDE AND VILANTEROL TRIFENATATE 62.5; 25 UG/1; UG/1
POWDER RESPIRATORY (INHALATION)
COMMUNITY
Start: 2018-03-10 | End: 2018-07-11

## 2018-06-05 ENCOUNTER — OFFICE VISIT (OUTPATIENT)
Dept: GASTROENTEROLOGY | Facility: CLINIC | Age: 68
End: 2018-06-05
Payer: MEDICARE

## 2018-06-05 VITALS
DIASTOLIC BLOOD PRESSURE: 60 MMHG | BODY MASS INDEX: 23.92 KG/M2 | WEIGHT: 191.38 LBS | SYSTOLIC BLOOD PRESSURE: 102 MMHG | HEART RATE: 87 BPM

## 2018-06-05 DIAGNOSIS — Z12.11 SCREENING FOR COLON CANCER: Primary | ICD-10-CM

## 2018-06-05 PROCEDURE — 99214 OFFICE O/P EST MOD 30 MIN: CPT | Performed by: INTERNAL MEDICINE

## 2018-06-05 RX ORDER — GABAPENTIN 600 MG/1
TABLET ORAL
Status: ON HOLD | COMMUNITY
Start: 2018-05-24 | End: 2018-11-06

## 2018-06-05 NOTE — PROGRESS NOTES
Follow-up Note -  [facility]  Tina Fortune 1950 75 y o  male     ASSESSMENT @ PLAN:   He is a 26-year-old male with chronic slow transit constipation with change in bowel habits over time  The patient does have a history of recurrent cardiac ascites but appears to have stabilized in terms of his cardiac function  We have seen him on numerous occasions in the hospital for this  1 will do colonoscopy to investigate    2 he will continue on his MiraLax 17 g p o  q day  I told him if he has a day where he has loose stool he should skip a dose of the MiraLax  3 he will continue on the Colace 100 mg p o  b i d  He is currently living at [facility]    Reason:  Constipation    HPI:    He is a 26-year-old male with chronic constipation and change in bowel habits who is here for evaluation  We have seen him on numerous occasions for cardiac ascites  His cardiac function has stabilized and he is living at [facility] and he is doing okay  He reports that he is periumbilical abdominal bloating and cramping with hardening of stools and incomplete evacuation  He takes MiraLax every day and he does have some days where he has diarrhea and looser stools  He is also on Colace  He has no melena or hematochezia  He reports his last colonoscopy was approximately 8 years ago  He is going for a foot procedure next week  He has a history of coronary artery disease with last stenting in 2016  He is on Plavix  He is on Eliquis  Nobody in the family had a history of inflammatory bowel disease or colon malignancy  REVIEW OF SYSTEMS:     CONSTITUTIONAL: Denies any fever, chills, or rigors  Good appetite, and no recent weight loss  HEENT: No earache or tinnitus  Denies hearing loss or visual disturbances  CARDIOVASCULAR: No chest pain or palpitations  RESPIRATORY: Denies any cough, hemoptysis, shortness of breath or dyspnea on exertion    GASTROINTESTINAL: As noted in the History of Present Illness  GENITOURINARY: No problems with urination  Denies any hematuria or dysuria  NEUROLOGIC: No dizziness or vertigo, denies headaches  MUSCULOSKELETAL: Denies any muscle or joint pain  SKIN: Denies skin rashes or itching  ENDOCRINE: Denies excessive thirst  Denies intolerance to heat or cold  PSYCHOSOCIAL: Denies depression or anxiety  Denies any recent memory loss  Past Medical History:   Diagnosis Date    At[address] )     CAD (coronary artery disease)     Cardiac disease     Cervicalgia     [facility]) [facility]     Chronic pain     COPD (chronic obstructive pulmonary disease) ([facility])     Hyperlipidemia     Hypertension     Neuropathy       Past Surgical History:   Procedure Laterality Date    APPENDECTOMY      CARDIAC SURGERY      graft restented    CERVICAL FUSION  10/21/2015    CORONARY ARTERY BYPASS GRAFT  1983     Social History     Social History    Marital status:      Spouse name: N/A    Number of children: N/A    Years of education: N/A     Occupational History    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[employer]     Social History Main Topics    Smoking status: Former Smoker     Quit date: 1982    Smokeless tobacco: Never Used    Alcohol use No    Drug use: No    Sexual activity: Not on file     Other Topics Concern    Not on file     Social History Narrative    No narrative on file     Family History   Problem Relation Age of Onset    Heart failure Mother     Sudden death Father      Patient has no known allergies    Current Outpatient Prescriptions   Medication Sig Dispense Refill    albuterol (2 5 mg/3 mL) 0 083 % nebulizer solution albuterol sulfate 2 5 mg/3 mL (0 083 %) solution for nebulization      ALPRAZolam (XANAX) 1 mg tablet Take 1 tablet (1 mg total) by mouth 30 min pre-procedure 1 tablet 0    ammonium lactate (LAC-HYDRIN) 12 % lotion       ANORO ELLIPTA 62 5-25 MCG/INH AEPB       apixaban (ELIQUIS) 5 mg Take 5 mg by mouth 2 (two) times a day        aspirin 81 mg chewable tablet Chew 1 tablet daily (Patient taking differently: Chew 81 mg daily  )  0    atorvastatin (LIPITOR) 40 mg tablet Take 40 mg by mouth daily        azithromycin (ZITHROMAX) 250 mg tablet azithromycin 250 mg tablet      benzonatate (TESSALON) 200 MG capsule take 1 capsule by mouth three times a day      budesonide-formoterol (SYMBICORT) 160-4 5 mcg/act inhaler Inhale 2 puffs 2 (two) times a day        busPIRone (BUSPAR) 10 mg tablet buspirone 10 mg tablet      carBAMazepine (TEGretol) 100 mg chewable tablet       Cephalexin 500 MG tablet Take 1 tablet by mouth 3 (three) times a day      ciprofloxacin (CIPRO) 500 mg tablet ciprofloxacin 500 mg tablet      clindamycin (CLEOCIN) 300 MG capsule clindamycin HCl 300 mg capsule      clopidogrel (PLAVIX) 75 mg tablet Take 75 mg by mouth daily        diazepam (VALIUM) 2 mg tablet diazepam 2 mg tablet      diltiazem (TAZTIA XT) 180 MG 24 hr capsule Taztia  mg capsule,extended release      docusate sodium (COLACE) 50 mg capsule Take 2 capsules by mouth 2 (two) times a day 10 capsule 0    DULoxetine (CYMBALTA) 60 mg delayed release capsule Take 60 mg by mouth daily        fluticasone (FLONASE) 50 mcg/act nasal spray fluticasone 50 mcg/actuation nasal spray,suspension      fluticasone-salmeterol (ADVAIR HFA) 230-21 MCG/ACT inhaler Advair  mcg-21 mcg/actuation aerosol inhaler      furosemide (LASIX) 20 mg tablet furosemide 20 mg tablet      furosemide (LASIX) 80 mg tablet furosemide 80 mg tablet      gabapentin (NEURONTIN) 300 mg capsule       gabapentin (NEURONTIN) 600 MG tablet       hydrochlorothiazide (HYDRODIURIL) 50 mg tablet hydrochlorothiazide 50 mg tablet      ibuprofen (MOTRIN) 400 mg tablet       isosorbide mononitrate (IMDUR) 30 mg 24 hr tablet Take 30 mg by mouth daily        levofloxacin (LEVAQUIN) 500 mg tablet levofloxacin 500 mg tablet      Lidocaine-Menthol 4-5 % PTCH Apply 1 patch topically        Linaclotide (LINZESS) 145 MCG CAPS Take 145 mcg by mouth daily        lisinopril (ZESTRIL) 20 mg tablet lisinopril 20 mg tablet      loperamide (IMODIUM A-D) 2 MG tablet Take 1 tablet by mouth daily as needed      LORazepam (ATIVAN) 0 5 mg tablet       LORazepam (ATIVAN) 1 mg tablet Take 1 mg by mouth every 8 (eight) hours as needed for anxiety        metolazone (ZAROXOLYN) 2 5 mg tablet metolazone 2 5 mg tablet      montelukast (SINGULAIR) 10 mg tablet montelukast 10 mg tablet      mupirocin (BACTROBAN) 2 % cream       naloxegol oxalate (MOVANTIK) 25 MG tablet Take 25 mg by mouth daily      nitroglycerin (NITROSTAT) 0 4 mg SL tablet Place 0 4 mg under the tongue every 5 (five) minutes as needed for chest pain        oseltamivir (TAMIFLU) 75 mg capsule       oxyCODONE (ROXICODONE) 15 mg immediate release tablet Take 5 mg by mouth every 6 (six) hours as needed for moderate pain        pantoprazole (PROTONIX) 40 mg tablet pantoprazole 40 mg tablet,delayed release      potassium chloride (K-DUR,KLOR-CON) 20 mEq tablet potassium chloride ER 20 mEq tablet,extended release(part/cryst)      predniSONE 10 mg tablet prednisone 10 mg tablet      QUEtiapine (SEROquel) 50 mg tablet Take 50 mg by mouth daily at bedtime        simethicone (MYLICON) 80 mg chewable tablet Chew 1 tablet every 6 (six) hours as needed for flatulence 30 tablet 0    SPIRIVA HANDIHALER 18 MCG inhalation capsule       tamsulosin (FLOMAX) 0 4 mg tamsulosin 0 4 mg capsule      temazepam (RESTORIL) 15 mg capsule       torsemide (DEMADEX) 20 mg tablet Take 40 mg by mouth 2 (two) times a day        traZODone (DESYREL) 50 mg tablet       zolpidem (AMBIEN) 10 mg tablet Take 10 mg by mouth daily at bedtime as needed for sleep        spironolactone (ALDACTONE) 50 mg tablet Take 1 tablet by mouth 2 (two) times a day for 30 days (Patient taking differently: Take 50 mg by mouth 2 (two) times a day  ) 60 tablet 0     No current facility-administered medications for this visit  Blood pressure 102/60, pulse 87, weight 86 8 kg (191 lb 6 oz)  PHYSICAL EXAM:     General Appearance:   Alert, cooperative, no distress, appears stated age    HEENT:   Normocephalic, atraumatic, anicteric      Neck:  Supple, symmetrical, trachea midline, no adenopathy;    thyroid: no enlargement/tenderness/nodules; no carotid  bruit or JVD    Lungs:   Clear to auscultation bilaterally; no rales, rhonchi or wheezing; respirations unlabored    Heart[de-identified]   S1 and S2 normal; regular rate and rhythm; no murmur, rub, or gallop     Abdomen:   Soft, non-tender, non-distended; normal bowel sounds; no masses, no organomegaly    Genitalia:   Deferred    Rectal:   Deferred    Extremities:  No cyanosis, clubbing or edema    Pulses:  2+ and symmetric all extremities    Skin:  Skin color, texture, turgor normal, no rashes or lesions    Lymph nodes:  No palpable cervical, axillary or inguinal lymphadenopathy        Lab Results   Component Value Date    WBC 5 41 09/10/2017    HGB 10 5 (L) 09/10/2017    HCT 31 8 (L) 09/10/2017    MCV 99 (H) 09/10/2017     (L) 09/10/2017     Lab Results   Component Value Date    GLUCOSE 111 09/10/2017    CALCIUM 8 8 09/10/2017     09/10/2017    K 3 4 (L) 09/10/2017    CO2 26 09/10/2017     09/10/2017    BUN 13 09/10/2017    CREATININE 0 84 09/10/2017     Lab Results   Component Value Date    ALT 23 08/27/2017    AST 17 08/27/2017    ALKPHOS 116 08/27/2017    BILITOT 0 70 08/27/2017     Lab Results   Component Value Date    INR 1 33 (H) 09/10/2017    INR 1 14 09/08/2017    INR 1 28 (H) 07/15/2017    PROTIME 16 8 (H) 09/10/2017    PROTIME 14 8 (H) 09/08/2017    PROTIME 16 3 (H) 07/15/2017

## 2018-06-05 NOTE — LETTER
June 5, 2018     Altagracia Ceja MD  [address]  [facility]  [address]    Patient: Sharon Solorzano   YOB: 1950   Date of Visit: 6/5/2018       Dear Dr Irais Villa: Thank you for referring Naomy Jackson to me for evaluation  Below are my notes for this consultation  If you have questions, please do not hesitate to call me  I look forward to following your patient along with you  Sincerely,        Chip Milan MD        CC: No Recipients  Chip Milan MD  6/5/2018 12:08 PM  Sign at close encounter  Follow-up Note - [address] [facility]  Sharon Solorzano 1950 75 y o  male     ASSESSMENT @ PLAN:   He is a 18-year-old male with chronic slow transit constipation with change in bowel habits over time  The patient does have a history of recurrent cardiac ascites but appears to have stabilized in terms of his cardiac function  We have seen him on numerous occasions in the hospital for this  1 will do colonoscopy to investigate    2 he will continue on his MiraLax 17 g p o  q day  I told him if he has a day where he has loose stool he should skip a dose of the MiraLax  3 he will continue on the Colace 100 mg p o  b i d  He is currently living at [facility]    Reason:  Constipation    HPI:    He is a 18-year-old male with chronic constipation and change in bowel habits who is here for evaluation  We have seen him on numerous occasions for cardiac ascites  His cardiac function has stabilized and he is living at [facility] and he is doing okay  He reports that he is periumbilical abdominal bloating and cramping with hardening of stools and incomplete evacuation  He takes MiraLax every day and he does have some days where he has diarrhea and looser stools  He is also on Colace  He has no melena or hematochezia  He reports his last colonoscopy was approximately 8 years ago  He is going for a foot procedure next week    He has a history of coronary artery disease with last stenting in 2016  He is on Plavix  He is on Eliquis  Nobody in the family had a history of inflammatory bowel disease or colon malignancy  REVIEW OF SYSTEMS:     CONSTITUTIONAL: Denies any fever, chills, or rigors  Good appetite, and no recent weight loss  HEENT: No earache or tinnitus  Denies hearing loss or visual disturbances  CARDIOVASCULAR: No chest pain or palpitations  RESPIRATORY: Denies any cough, hemoptysis, shortness of breath or dyspnea on exertion  GASTROINTESTINAL: As noted in the History of Present Illness  GENITOURINARY: No problems with urination  Denies any hematuria or dysuria  NEUROLOGIC: No dizziness or vertigo, denies headaches  MUSCULOSKELETAL: Denies any muscle or joint pain  SKIN: Denies skin rashes or itching  ENDOCRINE: Denies excessive thirst  Denies intolerance to heat or cold  PSYCHOSOCIAL: Denies depression or anxiety  Denies any recent memory loss       Past Medical History:   Diagnosis Date    Atrial fibrillation ([facility] [address] )     CAD (coronary artery disease)     Cardiac disease     Cervicalgia     Chronic diastolic CHF (congestive heart failure) ([facility])     Chronic pain     COPD (chronic obstructive pulmonary disease) (HCC)     Hyperlipidemia     Hypertension     Neuropathy       Past Surgical History:   Procedure Laterality Date    APPENDECTOMY      CARDIAC SURGERY      graft restented    CERVICAL FUSION  10/21/2015    CORONARY ARTERY BYPASS GRAFT  1983     Social History     Social History    Marital status:      Spouse name: N/A    Number of children: N/A    Years of education: N/A     Occupational History    [employer]     Social History Main Topics    Smoking status: Former Smoker     Quit date: 1982    Smokeless tobacco: Never Used    Alcohol use No    Drug use: No    Sexual activity: Not on file     Other Topics Concern    Not on file     Social History Narrative    No narrative on file     Family History   Problem Relation Age of Onset    Heart failure Mother     Sudden death Father      Patient has no known allergies    Current Outpatient Prescriptions   Medication Sig Dispense Refill    albuterol (2 5 mg/3 mL) 0 083 % nebulizer solution albuterol sulfate 2 5 mg/3 mL (0 083 %) solution for nebulization      ALPRAZolam (XANAX) 1 mg tablet Take 1 tablet (1 mg total) by mouth 30 min pre-procedure 1 tablet 0    ammonium lactate (LAC-HYDRIN) 12 % lotion       ANORO ELLIPTA 62 5-25 MCG/INH AEPB       apixaban (ELIQUIS) 5 mg Take 5 mg by mouth 2 (two) times a day        aspirin 81 mg chewable tablet Chew 1 tablet daily (Patient taking differently: Chew 81 mg daily  )  0    atorvastatin (LIPITOR) 40 mg tablet Take 40 mg by mouth daily        azithromycin (ZITHROMAX) 250 mg tablet azithromycin 250 mg tablet      benzonatate (TESSALON) 200 MG capsule take 1 capsule by mouth three times a day      budesonide-formoterol (SYMBICORT) 160-4 5 mcg/act inhaler Inhale 2 puffs 2 (two) times a day        busPIRone (BUSPAR) 10 mg tablet buspirone 10 mg tablet      carBAMazepine (TEGretol) 100 mg chewable tablet       Cephalexin 500 MG tablet Take 1 tablet by mouth 3 (three) times a day      ciprofloxacin (CIPRO) 500 mg tablet ciprofloxacin 500 mg tablet      clindamycin (CLEOCIN) 300 MG capsule clindamycin HCl 300 mg capsule      clopidogrel (PLAVIX) 75 mg tablet Take 75 mg by mouth daily        diazepam (VALIUM) 2 mg tablet diazepam 2 mg tablet      diltiazem (TAZTIA XT) 180 MG 24 hr capsule Taztia  mg capsule,extended release      docusate sodium (COLACE) 50 mg capsule Take 2 capsules by mouth 2 (two) times a day 10 capsule 0    DULoxetine (CYMBALTA) 60 mg delayed release capsule Take 60 mg by mouth daily        fluticasone (FLONASE) 50 mcg/act nasal spray fluticasone 50 mcg/actuation nasal spray,suspension      fluticasone-salmeterol (ADVAIR HFA) 230-21 MCG/ACT inhaler Advair  mcg-21 mcg/actuation aerosol inhaler      furosemide (LASIX) 20 mg tablet furosemide 20 mg tablet      furosemide (LASIX) 80 mg tablet furosemide 80 mg tablet      gabapentin (NEURONTIN) 300 mg capsule       gabapentin (NEURONTIN) 600 MG tablet       hydrochlorothiazide (HYDRODIURIL) 50 mg tablet hydrochlorothiazide 50 mg tablet      ibuprofen (MOTRIN) 400 mg tablet       isosorbide mononitrate (IMDUR) 30 mg 24 hr tablet Take 30 mg by mouth daily        levofloxacin (LEVAQUIN) 500 mg tablet levofloxacin 500 mg tablet      Lidocaine-Menthol 4-5 % PTCH Apply 1 patch topically        Linaclotide (LINZESS) 145 MCG CAPS Take 145 mcg by mouth daily        lisinopril (ZESTRIL) 20 mg tablet lisinopril 20 mg tablet      loperamide (IMODIUM A-D) 2 MG tablet Take 1 tablet by mouth daily as needed      LORazepam (ATIVAN) 0 5 mg tablet       LORazepam (ATIVAN) 1 mg tablet Take 1 mg by mouth every 8 (eight) hours as needed for anxiety        metolazone (ZAROXOLYN) 2 5 mg tablet metolazone 2 5 mg tablet      montelukast (SINGULAIR) 10 mg tablet montelukast 10 mg tablet      mupirocin (BACTROBAN) 2 % cream       naloxegol oxalate (MOVANTIK) 25 MG tablet Take 25 mg by mouth daily      nitroglycerin (NITROSTAT) 0 4 mg SL tablet Place 0 4 mg under the tongue every 5 (five) minutes as needed for chest pain        oseltamivir (TAMIFLU) 75 mg capsule       oxyCODONE (ROXICODONE) 15 mg immediate release tablet Take 5 mg by mouth every 6 (six) hours as needed for moderate pain        pantoprazole (PROTONIX) 40 mg tablet pantoprazole 40 mg tablet,delayed release      potassium chloride (K-DUR,KLOR-CON) 20 mEq tablet potassium chloride ER 20 mEq tablet,extended release(part/cryst)      predniSONE 10 mg tablet prednisone 10 mg tablet      QUEtiapine (SEROquel) 50 mg tablet Take 50 mg by mouth daily at bedtime        simethicone (MYLICON) 80 mg chewable tablet Chew 1 tablet every 6 (six) hours as needed for flatulence 30 tablet 0    SPIRIVA HANDIHALER 18 MCG inhalation capsule       tamsulosin (FLOMAX) 0 4 mg tamsulosin 0 4 mg capsule      temazepam (RESTORIL) 15 mg capsule       torsemide (DEMADEX) 20 mg tablet Take 40 mg by mouth 2 (two) times a day        traZODone (DESYREL) 50 mg tablet       zolpidem (AMBIEN) 10 mg tablet Take 10 mg by mouth daily at bedtime as needed for sleep        spironolactone (ALDACTONE) 50 mg tablet Take 1 tablet by mouth 2 (two) times a day for 30 days (Patient taking differently: Take 50 mg by mouth 2 (two) times a day  ) 60 tablet 0     No current facility-administered medications for this visit  Blood pressure 102/60, pulse 87, weight 86 8 kg (191 lb 6 oz)  PHYSICAL EXAM:     General Appearance:   Alert, cooperative, no distress, appears stated age    HEENT:   Normocephalic, atraumatic, anicteric      Neck:  Supple, symmetrical, trachea midline, no adenopathy;    thyroid: no enlargement/tenderness/nodules; no carotid  bruit or JVD    Lungs:   Clear to auscultation bilaterally; no rales, rhonchi or wheezing; respirations unlabored    Heart[de-identified]   S1 and S2 normal; regular rate and rhythm; no murmur, rub, or gallop     Abdomen:   Soft, non-tender, non-distended; normal bowel sounds; no masses, no organomegaly    Genitalia:   Deferred    Rectal:   Deferred    Extremities:  No cyanosis, clubbing or edema    Pulses:  2+ and symmetric all extremities    Skin:  Skin color, texture, turgor normal, no rashes or lesions    Lymph nodes:  No palpable cervical, axillary or inguinal lymphadenopathy        Lab Results   Component Value Date    WBC 5 41 09/10/2017    HGB 10 5 (L) 09/10/2017    HCT 31 8 (L) 09/10/2017    MCV 99 (H) 09/10/2017     (L) 09/10/2017     Lab Results   Component Value Date    GLUCOSE 111 09/10/2017    CALCIUM 8 8 09/10/2017     09/10/2017    K 3 4 (L) 09/10/2017    CO2 26 09/10/2017     09/10/2017    BUN 13 09/10/2017    CREATININE 0 84 09/10/2017     Lab Results   Component Value Date    ALT 23 08/27/2017    AST 17 08/27/2017    ALKPHOS 116 08/27/2017    BILITOT 0 70 08/27/2017     Lab Results   Component Value Date    INR 1 33 (H) 09/10/2017    INR 1 14 09/08/2017    INR 1 28 (H) 07/15/2017    PROTIME 16 8 (H) 09/10/2017    PROTIME 14 8 (H) 09/08/2017    PROTIME 16 3 (H) 07/15/2017

## 2018-06-12 ENCOUNTER — HOSPITAL ENCOUNTER (OUTPATIENT)
Facility: HOSPITAL | Age: 68
Setting detail: OUTPATIENT SURGERY
Discharge: NURSING, REHAB OR LONG-TERM CARE | End: 2018-06-12
Attending: PODIATRIST | Admitting: PODIATRIST
Payer: MEDICARE

## 2018-06-12 ENCOUNTER — ANESTHESIA (OUTPATIENT)
Dept: PERIOP | Facility: HOSPITAL | Age: 68
End: 2018-06-12
Payer: MEDICARE

## 2018-06-12 ENCOUNTER — ANESTHESIA EVENT (OUTPATIENT)
Dept: PERIOP | Facility: HOSPITAL | Age: 68
End: 2018-06-12
Payer: MEDICARE

## 2018-06-12 ENCOUNTER — APPOINTMENT (OUTPATIENT)
Dept: RADIOLOGY | Facility: HOSPITAL | Age: 68
End: 2018-06-12
Payer: MEDICARE

## 2018-06-12 VITALS
WEIGHT: 191.25 LBS | TEMPERATURE: 97.4 F | OXYGEN SATURATION: 96 % | HEART RATE: 71 BPM | RESPIRATION RATE: 20 BRPM | HEIGHT: 75 IN | DIASTOLIC BLOOD PRESSURE: 69 MMHG | BODY MASS INDEX: 23.78 KG/M2 | SYSTOLIC BLOOD PRESSURE: 122 MMHG

## 2018-06-12 LAB
ATRIAL RATE: 46 BPM
QRS AXIS: 81 DEGREES
QRSD INTERVAL: 92 MS
QT INTERVAL: 434 MS
QTC INTERVAL: 411 MS
T WAVE AXIS: 35 DEGREES
VENTRICULAR RATE: 54 BPM

## 2018-06-12 PROCEDURE — C1769 GUIDE WIRE: HCPCS | Performed by: PODIATRIST

## 2018-06-12 PROCEDURE — 73630 X-RAY EXAM OF FOOT: CPT

## 2018-06-12 PROCEDURE — 93005 ELECTROCARDIOGRAM TRACING: CPT

## 2018-06-12 PROCEDURE — 93010 ELECTROCARDIOGRAM REPORT: CPT | Performed by: INTERNAL MEDICINE

## 2018-06-12 RX ORDER — UREA 10 %
5 LOTION (ML) TOPICAL
COMMUNITY
End: 2018-07-11

## 2018-06-12 RX ORDER — PROPOFOL 10 MG/ML
INJECTION, EMULSION INTRAVENOUS CONTINUOUS PRN
Status: DISCONTINUED | OUTPATIENT
Start: 2018-06-12 | End: 2018-06-12 | Stop reason: SURG

## 2018-06-12 RX ORDER — MIDAZOLAM HYDROCHLORIDE 1 MG/ML
INJECTION INTRAMUSCULAR; INTRAVENOUS AS NEEDED
Status: DISCONTINUED | OUTPATIENT
Start: 2018-06-12 | End: 2018-06-12 | Stop reason: SURG

## 2018-06-12 RX ORDER — BUPIVACAINE HYDROCHLORIDE 2.5 MG/ML
INJECTION, SOLUTION EPIDURAL; INFILTRATION; INTRACAUDAL AS NEEDED
Status: DISCONTINUED | OUTPATIENT
Start: 2018-06-12 | End: 2018-06-12 | Stop reason: HOSPADM

## 2018-06-12 RX ORDER — CETIRIZINE HYDROCHLORIDE 10 MG/1
10 TABLET ORAL DAILY
Status: ON HOLD | COMMUNITY
End: 2018-06-21

## 2018-06-12 RX ORDER — DEXMEDETOMIDINE HYDROCHLORIDE 100 UG/ML
INJECTION, SOLUTION INTRAVENOUS AS NEEDED
Status: DISCONTINUED | OUTPATIENT
Start: 2018-06-12 | End: 2018-06-12 | Stop reason: SURG

## 2018-06-12 RX ORDER — GLYCOPYRROLATE 0.2 MG/ML
INJECTION INTRAMUSCULAR; INTRAVENOUS AS NEEDED
Status: DISCONTINUED | OUTPATIENT
Start: 2018-06-12 | End: 2018-06-12 | Stop reason: SURG

## 2018-06-12 RX ORDER — PROPOFOL 10 MG/ML
INJECTION, EMULSION INTRAVENOUS AS NEEDED
Status: DISCONTINUED | OUTPATIENT
Start: 2018-06-12 | End: 2018-06-12 | Stop reason: SURG

## 2018-06-12 RX ORDER — GABAPENTIN 300 MG/1
300 CAPSULE ORAL ONCE
Status: COMPLETED | OUTPATIENT
Start: 2018-06-12 | End: 2018-06-12

## 2018-06-12 RX ORDER — FENTANYL CITRATE/PF 50 MCG/ML
50 SYRINGE (ML) INJECTION
Status: DISCONTINUED | OUTPATIENT
Start: 2018-06-12 | End: 2018-06-12 | Stop reason: HOSPADM

## 2018-06-12 RX ORDER — LIDOCAINE HYDROCHLORIDE 10 MG/ML
INJECTION, SOLUTION INFILTRATION; PERINEURAL AS NEEDED
Status: DISCONTINUED | OUTPATIENT
Start: 2018-06-12 | End: 2018-06-12 | Stop reason: HOSPADM

## 2018-06-12 RX ORDER — SODIUM CHLORIDE, SODIUM LACTATE, POTASSIUM CHLORIDE, CALCIUM CHLORIDE 600; 310; 30; 20 MG/100ML; MG/100ML; MG/100ML; MG/100ML
100 INJECTION, SOLUTION INTRAVENOUS CONTINUOUS
Status: DISCONTINUED | OUTPATIENT
Start: 2018-06-12 | End: 2018-06-12 | Stop reason: HOSPADM

## 2018-06-12 RX ADMIN — PROPOFOL 50 MCG/KG/MIN: 10 INJECTION, EMULSION INTRAVENOUS at 07:45

## 2018-06-12 RX ADMIN — GABAPENTIN 300 MG: 300 CAPSULE ORAL at 09:57

## 2018-06-12 RX ADMIN — MIDAZOLAM HYDROCHLORIDE 1 MG: 1 INJECTION, SOLUTION INTRAMUSCULAR; INTRAVENOUS at 07:36

## 2018-06-12 RX ADMIN — GLYCOPYRROLATE 0.2 MG: 0.2 INJECTION, SOLUTION INTRAMUSCULAR; INTRAVENOUS at 07:53

## 2018-06-12 RX ADMIN — DEXMEDETOMIDINE 12 MCG: 100 INJECTION, SOLUTION, CONCENTRATE INTRAVENOUS at 07:41

## 2018-06-12 RX ADMIN — CEFAZOLIN SODIUM 2000 MG: 2 SOLUTION INTRAVENOUS at 07:36

## 2018-06-12 RX ADMIN — SODIUM CHLORIDE, SODIUM LACTATE, POTASSIUM CHLORIDE, AND CALCIUM CHLORIDE: .6; .31; .03; .02 INJECTION, SOLUTION INTRAVENOUS at 07:34

## 2018-06-12 RX ADMIN — FENTANYL CITRATE 50 MCG: 50 INJECTION, SOLUTION INTRAMUSCULAR; INTRAVENOUS at 08:49

## 2018-06-12 RX ADMIN — PROPOFOL 60 MG: 10 INJECTION, EMULSION INTRAVENOUS at 07:41

## 2018-06-12 NOTE — ANESTHESIA POSTPROCEDURE EVALUATION
Post-Op Assessment Note      CV Status:  Stable    Mental Status:  Alert and awake    Hydration Status:  Euvolemic    PONV Controlled:  Controlled    Airway Patency:  Patent    Post Op Vitals Reviewed: Yes          Staff: CRNA           BP   115/52   Temp  97 4   Pulse  73   Resp   18   SpO2   100%

## 2018-06-12 NOTE — H&P
Please see anesthesia preoperative evaluation note for history and physical exam on DOS  Plan for right 2nd toe osteotomy with pins and screws

## 2018-06-12 NOTE — ANESTHESIA PREPROCEDURE EVALUATION
Review of Systems/Medical History  Patient summary reviewed  Chart reviewed  No history of anesthetic complications     Cardiovascular  EKG reviewed, Exercise tolerance (METS): <4, Exercise comment: Reports can walk several blocks with cane before he gets short of breath Hyperlipidemia, Hypertension , Past MI , CAD , History of CABG (1983), Cardiac stents (multiple stents, patient estimates ~10?, most recently BMS to LAD 7/2017  Takes ASA and Plavix, last took 7 days ago)  Dysrhythmias (chronic atrial fibrillation) , CHF (combined systolic and diastolic  Admitted 6x in 2016 for CHF exacerbations, after adjustment of medications no admissions since 2016) , Orthopnea (2 pillow),    Pulmonary  Smoker (quit 1982) ex-smoker  , COPD moderate- medication dependent , Shortness of breath, Sleep apnea ,        GI/Hepatic  Negative GI/hepatic ROS     Comment: Confirmed NPO appropriate     Negative  ROS        Endo/Other  Negative endo/other ROS      GYN       Hematology    Coagulation disorder (last dose of eliquis 7 days ago) currently taking oral anticoagulants,    Musculoskeletal  Back pain , cervical pain,   Arthritis     Neurology      Comment: Cervical myelopathy Psychology   Anxiety, Depression ,   Chronic opioid dependence Chronic pain,            Physical Exam    Airway    Mallampati score: II  TM Distance: >3 FB  Neck ROM: limited     Dental       Cardiovascular  Rhythm: irregular, Rate: normal,     Pulmonary  Decreased breath sounds, No wheezes,     Other Findings        Anesthesia Plan  ASA Score- 3     Anesthesia Type- IV sedation with anesthesia with ASA Monitors  Additional Monitors:   Airway Plan:     Comment: I discussed the risks and benefits of IV sedation anesthesia including the possibility of the need to convert to general anesthesia and the potential risk of awareness  The patient was given the opportunity to ask questions, which were answered        Plan Factors-    Induction- intravenous  Postoperative Plan-     Informed Consent- Anesthetic plan and risks discussed with patient  NM Stress 8/22/17:  SUMMARY:  -  Stress results: There was no chest pain during stress  -  ECG conclusions: The stress ECG was negative for ischemia and normal   -  Perfusion imaging: There was a large, severe, fixed myocardial perfusion defect of the entire inferior wall probably due to old inferior infarct with no reversible ischemia   -  Gated SPECT: The calculated left ventricular ejection fraction was 43 %  There was severely reduced myocardial thickening and motion of the inferior wall of the left ventricle  IMPRESSIONS: Abnormal study  There was a moderate-sized infarct  There was in the inferior region  TTE 6/27/17:  SUMMARY  LEFT VENTRICLE:  Systolic function was normal  Ejection fraction was estimated to be 55 %  There were no regional wall motion abnormalities  Features were consistent with a pseudonormal left ventricular filling pattern, with concomitant abnormal relaxation and increased filling pressure (grade 2 diastolic dysfunction)  LEFT ATRIUM:  The atrium was mildly dilated  MITRAL VALVE:  There was mild regurgitation  TRICUSPID VALVE:  There was moderate regurgitation  PULMONIC VALVE:  There was mild regurgitation        Lab Results   Component Value Date    WBC 5 41 09/10/2017    HGB 10 5 (L) 09/10/2017    HCT 31 8 (L) 09/10/2017    MCV 99 (H) 09/10/2017     (L) 09/10/2017     Lab Results   Component Value Date    GLUCOSE 111 09/10/2017    CALCIUM 8 8 09/10/2017     09/10/2017    K 3 4 (L) 09/10/2017    CO2 26 09/10/2017     09/10/2017    BUN 13 09/10/2017    CREATININE 0 84 09/10/2017     Lab Results   Component Value Date    ALT 23 08/27/2017    AST 17 08/27/2017    ALKPHOS 116 08/27/2017    BILITOT 0 70 08/27/2017     Lab Results   Component Value Date    INR 1 33 (H) 09/10/2017    INR 1 14 09/08/2017    INR 1 28 (H) 07/15/2017    PROTIME 16 8 (H) 09/10/2017    PROTIME 14 8 (H) 09/08/2017    PROTIME 16 3 (H) 07/15/2017     Lab Results   Component Value Date    HGBA1C 5 5 08/19/2016

## 2018-06-12 NOTE — OP NOTE
OPERATIVE REPORT  PATIENT NAME: Remedios Scott    :  1950  MRN: [id number]  Pt Location: [facility] OR ROOM 04    SURGERY DATE: 2018    Surgeon(s) and Role:     * Lillie Gore DPM - Primary    Preop Diagnosis:  Other hammer toe(s) (acquired), right foot [M20 41]    Post-Op Diagnosis Codes:     * Other hammer toe(s) (acquired), right foot [M20 41]    Procedure(s) (LRB):  2ND TOE OSTEOTOMY, HAMMERTOE CORRECTION WITH PIN AND SCREWS (Right)    Specimen(s):  * No specimens in log *    Estimated Blood Loss:   Minimal    Drains:       Anesthesia Type:   IV Sedation with Anesthesia    Operative Indications: Other hammer toe(s) (acquired), right foot [M20 41]  Pain     Operative Findings:  Dystrophic bone, fused joint IPJ     Complications:   None    Procedure and Technique:  Patient is brought back to the OR and laid supine on the table  Once MAC sedation was achieved, a local injection of a 1:1 mixture of 1% lidocaine plain and 0 5% marcaine plain was injected into the right foot in a local digital block  An 18 inch ankle tourniquet was placed about the right ankle  The foot was then scrubbed prepped and draped in the usual manner  Attention was directed to the left foot second digit  The digit was noted to be locked and contracted in a lateral direction  Using an escmarch bandage the right foot was exsanguinated and the tourniquet was inflated to 250 mmHg  A skin line was drawn dorsally along the second digit right foot  A 15 blade was used to incise the skin, it was then deeped to capsule  The extensor tendon was cut at the joint space and retracted  The bone at the distal aspect of the proximal phalange was noted to be dystrophic  Using a sagittal saw the head of proximal phalange was debrided away en toto  It was then flushed with normal sterile saline  A pin was attempted to be placed, but it was noted to have too much of a drift so it was removed    The tendon was reapproximated using 3 0 vircryl and then 3 0 nylon for the skin in simple interupted sutures  The tourniquet was deflated and a hyperemic response was noted to the right foot and all 5 digits  He is dressed with DSD  He is then transferred to PACU where he will be allowed to recover a short time and then return home        Patient Disposition:  PACU     SIGNATURE: Faith Byers DPM  DATE: June 12, 2018  TIME: 9:12 AM

## 2018-06-13 ENCOUNTER — TELEPHONE (OUTPATIENT)
Dept: GASTROENTEROLOGY | Facility: CLINIC | Age: 68
End: 2018-06-13

## 2018-06-13 NOTE — TELEPHONE ENCOUNTER
Ankita Yi at [facility] is calling on behalf of the patient regarding his blood thinner medicaton and when to stop because he has a colonoscopy scheduled for 6/21/18    Her callback number:     [phone]

## 2018-06-13 NOTE — TELEPHONE ENCOUNTER
SPOKE WITH [facility] AND INSTRUCTED TO HOLD PLAVIX FOR 3 DAYS PRIOR HOLD ELIQUIS DAY BEFORE AND MORNING OF PROCEDURE CONTINUE ASA AND HOLD ADVIL

## 2018-06-18 ENCOUNTER — TELEPHONE (OUTPATIENT)
Dept: GASTROENTEROLOGY | Facility: CLINIC | Age: 68
End: 2018-06-18

## 2018-06-20 ENCOUNTER — ANESTHESIA EVENT (OUTPATIENT)
Dept: PERIOP | Facility: HOSPITAL | Age: 68
End: 2018-06-20
Payer: MEDICARE

## 2018-06-20 NOTE — ANESTHESIA PREPROCEDURE EVALUATION
Review of Systems/Medical History  Patient summary reviewed  Chart reviewed  No history of anesthetic complications     Cardiovascular  EKG reviewed, Hyperlipidemia, Hypertension , Past MI , CAD , CHF ,   Comment: Atrial fibrillation with slow ventricular response with premature ventricular or aberrantly conducted complexes  Incomplete right bundle branch block  Cannot rule out Anteroseptal infarct , age undetermined  Abnormal ECG    Confirmed by Clark Rosas ([id number]) on 6/12/2018 7:42:07 PM    Specimen Collected: 06/12/18 06:59  Last Resulted: 06/12/18 19:42      LEFT VENTRICLE: Size was normal  Systolic function was normal  Ejection fraction was estimated to be 55 %  There were no regional wall motion abnormalities  Wall thickness was normal  DOPPLER: Features were consistent with a pseudonormal  left ventricular filling pattern, with concomitant abnormal relaxation and increased filling pressure (grade 2 diastolic dysfunction)      RIGHT VENTRICLE: The size was normal  Systolic function was normal  Wall thickness was normal      LEFT ATRIUM: The atrium was mildly dilated      RIGHT ATRIUM: Size was normal      MITRAL VALVE: Valve structure was normal  There was normal leaflet separation  DOPPLER: The transmitral velocity was within the normal range  There was no evidence for stenosis  There was mild regurgitation      AORTIC VALVE: The valve was trileaflet  Leaflets exhibited normal thickness and normal cuspal separation  DOPPLER: Transaortic velocity was within the normal range  There was no evidence for stenosis  There was no regurgitation      TRICUSPID VALVE: The valve structure was normal  There was normal leaflet separation  DOPPLER: The transtricuspid velocity was within the normal range  There was no evidence for stenosis  There was moderate regurgitation  Estimated peak PA  pressure was 40 mmHg   The findings suggest moderate pulmonary hypertension      PULMONIC VALVE: Leaflets exhibited normal thickness, no calcification, and normal cuspal separation  DOPPLER: The transpulmonic velocity was within the normal range  There was mild regurgitation      PERICARDIUM: There was no pericardial effusion  The pericardium was normal in appearance      AORTA: The root exhibited normal size      SYSTEMIC VEINS: IVC: The inferior vena cava was normal in size and course  The inferior vena cava was mildly dilated  Respirophasic changes in dimension were absent , Pulmonary hypertension Pulmonary  COPD , Shortness of breath, Sleep apnea ,        GI/Hepatic            Endo/Other     GYN       Hematology   Musculoskeletal    Arthritis     Neurology   Psychology   Anxiety, Depression ,              Physical Exam    Airway    Mallampati score: II  TM Distance: >3 FB  Neck ROM: full     Dental   No notable dental hx     Cardiovascular  Cardiovascular exam normal    Pulmonary  Pulmonary exam normal Breath sounds clear to auscultation,     Other Findings        Anesthesia Plan  ASA Score- 3     Anesthesia Type- IV sedation with anesthesia with ASA Monitors  Additional Monitors:   Airway Plan:         Plan Factors- Patient instructed to abstain from smoking on day of procedure       Induction- intravenous  Postoperative Plan-     Informed Consent- Anesthetic plan and risks discussed with patient  I personally reviewed this patient with the CRNA  Discussed and agreed on the Anesthesia Plan with the CRNA             Lab Results   Component Value Date    WBC 5 41 09/10/2017    HGB 10 5 (L) 09/10/2017    HCT 31 8 (L) 09/10/2017    MCV 99 (H) 09/10/2017     (L) 09/10/2017     Lab Results   Component Value Date    GLUCOSE 111 09/10/2017    CALCIUM 8 8 09/10/2017     09/10/2017    K 3 4 (L) 09/10/2017    CO2 26 09/10/2017     09/10/2017    BUN 13 09/10/2017    CREATININE 0 84 09/10/2017     Lab Results   Component Value Date    INR 1 33 (H) 09/10/2017    INR 1 14 09/08/2017    INR 1 28 (H) 07/15/2017    PROTIME 16  8 (H) 09/10/2017    PROTIME 14 8 (H) 09/08/2017    PROTIME 16 3 (H) 07/15/2017     Lab Results   Component Value Date    PTT 32 09/08/2017     Type and Screen:  A      I, Dr Alla Murphy, the attending physician, have personally seen and evaluated the patient prior to anesthetic care  I have reviewed the pre-anesthetic record, and other medical records if appropriate to the anesthetic care  If a CRNA is involved in the case, I have reviewed the CRNA assessment, if present, and agree  The patient is in a suitable condition to proceed with my formulated anesthetic plan

## 2018-06-21 ENCOUNTER — HOSPITAL ENCOUNTER (OUTPATIENT)
Facility: HOSPITAL | Age: 68
Setting detail: OUTPATIENT SURGERY
Discharge: HOME | End: 2018-06-21
Attending: INTERNAL MEDICINE | Admitting: INTERNAL MEDICINE
Payer: MEDICARE

## 2018-06-21 ENCOUNTER — ANESTHESIA (OUTPATIENT)
Dept: PERIOP | Facility: HOSPITAL | Age: 68
End: 2018-06-21
Payer: MEDICARE

## 2018-06-21 VITALS
DIASTOLIC BLOOD PRESSURE: 73 MMHG | WEIGHT: 193.34 LBS | BODY MASS INDEX: 24.04 KG/M2 | HEART RATE: 62 BPM | OXYGEN SATURATION: 96 % | RESPIRATION RATE: 16 BRPM | SYSTOLIC BLOOD PRESSURE: 130 MMHG | TEMPERATURE: 97.6 F | HEIGHT: 75 IN

## 2018-06-21 DIAGNOSIS — Z12.11 SCREENING FOR COLON CANCER: ICD-10-CM

## 2018-06-21 PROCEDURE — 45385 COLONOSCOPY W/LESION REMOVAL: CPT | Performed by: INTERNAL MEDICINE

## 2018-06-21 PROCEDURE — 88305 TISSUE EXAM BY PATHOLOGIST: CPT | Performed by: PATHOLOGY

## 2018-06-21 PROCEDURE — 45380 COLONOSCOPY AND BIOPSY: CPT | Performed by: INTERNAL MEDICINE

## 2018-06-21 RX ORDER — EPHEDRINE SULFATE 50 MG/ML
INJECTION, SOLUTION INTRAVENOUS AS NEEDED
Status: DISCONTINUED | OUTPATIENT
Start: 2018-06-21 | End: 2018-06-21 | Stop reason: SURG

## 2018-06-21 RX ORDER — PROPOFOL 10 MG/ML
INJECTION, EMULSION INTRAVENOUS AS NEEDED
Status: DISCONTINUED | OUTPATIENT
Start: 2018-06-21 | End: 2018-06-21 | Stop reason: SURG

## 2018-06-21 RX ORDER — SODIUM CHLORIDE, SODIUM LACTATE, POTASSIUM CHLORIDE, CALCIUM CHLORIDE 600; 310; 30; 20 MG/100ML; MG/100ML; MG/100ML; MG/100ML
125 INJECTION, SOLUTION INTRAVENOUS CONTINUOUS
Status: DISCONTINUED | OUTPATIENT
Start: 2018-06-21 | End: 2018-06-21 | Stop reason: HOSPADM

## 2018-06-21 RX ADMIN — PROPOFOL 20 MG: 10 INJECTION, EMULSION INTRAVENOUS at 11:33

## 2018-06-21 RX ADMIN — PROPOFOL 30 MG: 10 INJECTION, EMULSION INTRAVENOUS at 11:37

## 2018-06-21 RX ADMIN — EPHEDRINE SULFATE 5 MG: 50 INJECTION, SOLUTION INTRAMUSCULAR; INTRAVENOUS; SUBCUTANEOUS at 11:37

## 2018-06-21 RX ADMIN — PROPOFOL 20 MG: 10 INJECTION, EMULSION INTRAVENOUS at 11:30

## 2018-06-21 RX ADMIN — SODIUM CHLORIDE, SODIUM LACTATE, POTASSIUM CHLORIDE, AND CALCIUM CHLORIDE: .6; .31; .03; .02 INJECTION, SOLUTION INTRAVENOUS at 11:10

## 2018-06-21 RX ADMIN — PROPOFOL 110 MG: 10 INJECTION, EMULSION INTRAVENOUS at 11:27

## 2018-06-21 NOTE — PERIOPERATIVE NURSING NOTE
Pt states most meds he is off temporarily due to being in nursing home  Asked to keep them in his chart  All marked unknown as last dose

## 2018-06-21 NOTE — OP NOTE
**** GI/ENDOSCOPY REPORT ****     PATIENT NAME: Joanne Wheatley ------ VISIT ID:  Patient ID:   [id number] YOB: 1950     INTRODUCTION: Colonoscopy - A 75 male patient presents for an outpatient   Colonoscopy at [address] [place] [address]  PREVIOUS COLONOSCOPY: This colonoscopy is being performed for diagnostic   indication     INDICATIONS: Abdominal pain  Constipation  CONSENT:  The benefits, risks, and alternatives to the procedure were   discussed and informed consent was obtained from the patient  PREPARATION: EKG, pulse, pulse oximetry and blood pressure were monitored   throughout the procedure  The patient was identified by myself both   verbally and by visual inspection of ID band  Airway Assessment   Classification: Airway class 2 - Visualization of the soft palate, fauces   and uvula  ASA Classification: Class 3 - Patient has severe systemic   disturbance that may or may not be related to the disorder requiring   surgery  The patient was kept NPO for eight hours prior to the procedure  The patient received Nulytely in preparation for the procedure  MEDICATIONS: Anesthesia-check records MAC anesthesia  PROCEDURE:  The endoscope was passed without difficulty through the anus   under direct visualization and advanced to the cecum, confirmed by   appendiceal orifice and ileocecal valve  The scope was withdrawn and the   mucosa was carefully examined  The quality of the preparation was good  Cecal Intubation Time: 7 minutes(s) Scope Withdrawal Time: 8 minutes(s)     RECTAL EXAM: Normal rectal exam      FINDINGS:  There was evidence of diverticulosis in the sigmoid colon,   descending colon, and splenic flexure  A single sessile polyp, measuring   between 5 and 10 mm in size, was found in the ascending colon and sigmoid   colon  The polyp was completely removed by cold snare polypectomy  The   polyp was retrieved   A single sessile polyp, measuring between 5 and 10 mm   in size, was found in the hepatic flexure  The polyp was completely   removed by cold biopsy polypectomy  The polyp was retrieved  COMPLICATIONS: There were no complications  IMPRESSIONS: Diverticulosis found in the sigmoid colon, descending colon,   and splenic flexure  A single sessile polyp found in the ascending colon   and sigmoid colon; removed by cold snare polypectomy  A single sessile   polyp found in the hepatic flexure; removed by cold biopsy polypectomy  RECOMMENDATIONS: Continue Miralax 17gm QD  Follow-up on the results of the   biopsy specimens  ESTIMATED BLOOD LOSS: None  PATHOLOGY SPECIMENS: Completely removed by cold snare polypectomy  Completely removed by cold biopsy polypectomy  Yes     PROCEDURE CODES: : Colonoscopy with snare polypectomy Colonoscopy with   biopsy     ICD-9 Codes: 789 00 Abdominal pain, unspecified site 564 00 Constipation,   unspecified 562 10 Diverticulosis of colon (without mention of hemorrhage)   211 3 Benign neoplasm of colon 211 3 Benign neoplasm of colon     ICD-10 Codes: R10 Abdominal and pelvic pain K59 00 Constipation,   unspecified K57 Diverticular disease of intestine K63 5 Polyp of colon   K63 5 Polyp of colon     PERFORMED BY: SETH Xavier  on 06/21/2018  Version 1, electronically signed by SETH Henry  on   06/21/2018 at 11:44

## 2018-06-21 NOTE — DISCHARGE INSTRUCTIONS
Colonoscopy   WHAT YOU NEED TO KNOW:   A colonoscopy is a procedure to examine the inside of your colon (intestine) with a scope  Polyps or tissue growths may have been removed during your colonoscopy  It is normal to feel bloated and to have some abdominal discomfort  You should be passing gas  If you have hemorrhoids or you had polyps removed, you may have a small amount of bleeding  DISCHARGE INSTRUCTIONS:   Seek care immediately if:   · You have a large amount of bright red blood in your bowel movements  · Your abdomen is hard and firm and you have severe pain  · You have sudden trouble breathing  Contact your healthcare provider if:   · You develop a rash or hives  · You have a fever within 24 hours of your procedure  · You have not had a bowel movement for 3 days after your procedure  · You have questions or concerns about your condition or care  Activity:   · Do not lift, strain, or run  for 3 days after your procedure  · Rest after your procedure  You have been given medicine to relax you  Do not  drive or make important decisions until the day after your procedure  Return to your normal activity as directed  · Relieve gas and discomfort from bloating  by lying on your right side with a heating pad on your abdomen  You may need to take short walks to help the gas move out  Eat small meals until bloating is relieved  If you had polyps removed: For 7 days after your procedure:    · Do not  go on long car rides  Help prevent constipation:   · Eat a variety of healthy foods  Healthy foods include fruit, vegetables, whole-grain breads, low-fat dairy products, beans, lean meat, and fish  Ask if you need to be on a special diet  Your healthcare provider may recommend that you eat high-fiber foods such as cooked beans  Fiber helps you have regular bowel movements  · Drink liquids as directed  Adults should drink between 9 and 13 eight-ounce cups of liquid every day   Ask what amount is best for you  For most people, good liquids to drink are water, juice, and milk  · Exercise as directed  Talk to your healthcare provider about the best exercise plan for you  Exercise can help prevent constipation, decrease your blood pressure and improve your health  Follow up with your healthcare provider as directed:  Write down your questions so you remember to ask them during your visits  © 2017 [address] [employer]  Information is for End User's use only and may not be sold, redistributed or otherwise used for commercial purposes  All illustrations and images included in CareNotes® are the copyrighted property of [employer] , [employer]  or Jovanni Perales  The above information is an  only  It is not intended as medical advice for individual conditions or treatments  Talk to your doctor, nurse or pharmacist before following any medical regimen to see if it is safe and effective for you

## 2018-06-21 NOTE — ANESTHESIA POSTPROCEDURE EVALUATION
Post-Op Assessment Note      CV Status:  Stable    Mental Status:  Alert and awake    Hydration Status:  Stable    PONV Controlled:  None    Airway Patency:  Patent    Post Op Vitals Reviewed: Yes          Staff: CRNA           BP   115/68   Temp      Pulse  82   Resp   13   SpO2   98% on 2LNC   Post procedure VS noted above, SV non obstructed

## 2018-06-25 ENCOUNTER — OFFICE VISIT (OUTPATIENT)
Dept: OBGYN CLINIC | Facility: CLINIC | Age: 68
End: 2018-06-25
Payer: MEDICARE

## 2018-06-25 VITALS
SYSTOLIC BLOOD PRESSURE: 102 MMHG | HEIGHT: 75 IN | DIASTOLIC BLOOD PRESSURE: 69 MMHG | HEART RATE: 62 BPM | WEIGHT: 192 LBS | BODY MASS INDEX: 23.87 KG/M2

## 2018-06-25 DIAGNOSIS — M19.012 GLENOHUMERAL ARTHRITIS, LEFT: Primary | ICD-10-CM

## 2018-06-25 DIAGNOSIS — S46.219A BICEPS TENDON TEAR: ICD-10-CM

## 2018-06-25 DIAGNOSIS — M75.52 SUBACROMIAL BURSITIS OF LEFT SHOULDER JOINT: ICD-10-CM

## 2018-06-25 DIAGNOSIS — M62.512 ATROPHY OF MUSCLE OF LEFT SHOULDER: ICD-10-CM

## 2018-06-25 PROCEDURE — 99213 OFFICE O/P EST LOW 20 MIN: CPT | Performed by: FAMILY MEDICINE

## 2018-06-25 RX ORDER — OXYCODONE HYDROCHLORIDE AND ACETAMINOPHEN 5; 325 MG/1; MG/1
TABLET ORAL
COMMUNITY
Start: 2018-06-14 | End: 2018-07-11

## 2018-06-25 RX ORDER — SPIRONOLACTONE 25 MG/1
25 TABLET ORAL DAILY
COMMUNITY
Start: 2018-06-18

## 2018-06-25 NOTE — PROGRESS NOTES
AAssessment/Plan:  Assessment/Plan   Diagnoses and all orders for this visit:    Glenohumeral arthritis, left  -     Ambulatory referral to Orthopedic Surgery; Future    Atrophy of muscle of left shoulder  -     Ambulatory referral to Orthopedic Surgery; Future    Subacromial bursitis of left shoulder joint    Biceps tendon tear    Other orders  -     oxyCODONE-acetaminophen (PERCOCET) 5-325 mg per tablet; Earliest Fill Date: 6/14/18   -     spironolactone (ALDACTONE) 25 mg tablet;           50-year-old right-hand-dominant male with left shoulder pain  Discussed with patient physical exam, MRI findings, impression, and plan  MRI is noted for significant glenohumeral arthritis, diffuse atrophy of rotator cuff muscle without tear, and complete tear of intracapsular long head biceps tendon  His physical exam is noted for tenderness upon palpation over the trapezius and subacromial areas  Range of motion is noted to be for flexion 45° and abduction 45° with internal rotation to the sacrum, with 4+/ 5 strength diffusely  I discussed with patient my recommendation of evaluation by orthopedic surgeon, as due to the severity of his arthritis with muscle atrophy, and has not improved after having had corticosteroid injection and more than 4 months of physical therapy, surgical intervention such as shoulder replacement may be an option  I will refer him to Dr YAÑEZ [facility] for further evaluation and recommendation of treatment options  Subjective:   Patient ID: Tina Fortune is a 75 y o  male  Chief Complaint   Patient presents with    Left Shoulder - Follow-up        50-year-old right-hand-dominant male following up for left shoulder pain  He was last seen 3 months ago at which point he was referred for MRI of the left shoulder  Prior to his last visit 3 months ago he has been doing physical therapy for 4 months and also received corticosteroid injection to the left shoulder without improvement   Today reports he still has pain that is described as localized to the shoulder, nonradiating, intermittent, worse with elevating the arm, and relieved with neutral position  He reports difficulty with tasks such as getting dressed as he is unable to elevate the arm even to shoulder level  He does report history of neuropathy for which he experiences numbness/ tingling of both upper extremities and lower extremities  Shoulder Pain   This is a chronic problem  The current episode started more than 1 month ago  The problem occurs constantly  The problem has been unchanged  Associated symptoms include arthralgias, numbness and weakness  Exacerbated by: Arm elevation  Treatments tried: Corticosteroid injection  The treatment provided no relief  Review of Systems   Musculoskeletal: Positive for arthralgias  Neurological: Positive for weakness and numbness  Objective:  Vitals:    06/25/18 1319   BP: 102/69   BP Location: Left arm   Patient Position: Sitting   Cuff Size: Standard   Pulse: 62   Weight: 87 1 kg (192 lb)   Height: 6' 3" (1 905 m)     Left Shoulder Exam     Tenderness   Left shoulder tenderness location: Trapezius, subacromial     Range of Motion   Left shoulder active abduction: 45°   Passive Abduction: 50   Left shoulder forward flexion: 45°   Internal Rotation 0 degrees: Sacrum     Comments:     4+/ 5 strength abduction, internal rotation, external rotation, supraspinatus, subscapularis            Physical Exam   Constitutional: He is oriented to person, place, and time  No distress  HENT:   Head: Normocephalic and atraumatic  Eyes: Conjunctivae are normal    Neck: No tracheal deviation present  Cardiovascular: Normal rate  Pulmonary/Chest: Effort normal  No respiratory distress  Abdominal: He exhibits no distension  Neurological: He is alert and oriented to person, place, and time  Skin: Skin is warm and dry  Psychiatric: He has a normal mood and affect   His behavior is normal  I have personally reviewed pertinent films in PACS

## 2018-07-02 ENCOUNTER — TELEPHONE (OUTPATIENT)
Dept: GASTROENTEROLOGY | Facility: CLINIC | Age: 68
End: 2018-07-02

## 2018-07-02 NOTE — TELEPHONE ENCOUNTER
----- Message from Trevon Turcios MD sent at 7/1/2018 12:32 PM EDT -----  PLS CALL PT AND TELL HIM POLYPS ARE PRECANCEROUS AND HE NEEDS COLONOSCOPY IN 3YRS; PUT IN RECALL

## 2018-07-10 ENCOUNTER — TELEPHONE (OUTPATIENT)
Dept: CARDIOLOGY CLINIC | Facility: CLINIC | Age: 68
End: 2018-07-10

## 2018-07-10 NOTE — TELEPHONE ENCOUNTER
Claudette Johnson called he is scheduled to have a left shoulder replacement in August and needs cardiac clearance  Pt transferred to nursing line due to no available appts  Please advise     C/b # [phone]

## 2018-07-11 ENCOUNTER — APPOINTMENT (OUTPATIENT)
Dept: RADIOLOGY | Facility: CLINIC | Age: 68
End: 2018-07-11
Payer: MEDICARE

## 2018-07-11 ENCOUNTER — OFFICE VISIT (OUTPATIENT)
Dept: OBGYN CLINIC | Facility: CLINIC | Age: 68
End: 2018-07-11
Payer: MEDICARE

## 2018-07-11 VITALS
DIASTOLIC BLOOD PRESSURE: 63 MMHG | HEIGHT: 75 IN | SYSTOLIC BLOOD PRESSURE: 120 MMHG | WEIGHT: 198.6 LBS | BODY MASS INDEX: 24.69 KG/M2 | HEART RATE: 63 BPM

## 2018-07-11 DIAGNOSIS — Z01.818 PRE-OPERATIVE EXAM: ICD-10-CM

## 2018-07-11 DIAGNOSIS — M25.512 LEFT SHOULDER PAIN, UNSPECIFIED CHRONICITY: ICD-10-CM

## 2018-07-11 DIAGNOSIS — M19.012 ARTHRITIS OF LEFT SHOULDER REGION: ICD-10-CM

## 2018-07-11 DIAGNOSIS — M47.12 CERVICAL SPONDYLOSIS WITH MYELOPATHY: ICD-10-CM

## 2018-07-11 DIAGNOSIS — M62.512 ATROPHY OF MUSCLE OF LEFT SHOULDER: Primary | ICD-10-CM

## 2018-07-11 DIAGNOSIS — M75.52 SUBACROMIAL BURSITIS OF LEFT SHOULDER JOINT: ICD-10-CM

## 2018-07-11 PROCEDURE — 99214 OFFICE O/P EST MOD 30 MIN: CPT | Performed by: ORTHOPAEDIC SURGERY

## 2018-07-11 PROCEDURE — 73030 X-RAY EXAM OF SHOULDER: CPT

## 2018-07-11 RX ORDER — BISACODYL 10 MG
10 SUPPOSITORY, RECTAL RECTAL DAILY
COMMUNITY
End: 2018-10-19

## 2018-07-11 RX ORDER — IBUPROFEN 400 MG/1
400 TABLET ORAL EVERY 6 HOURS PRN
COMMUNITY

## 2018-07-11 RX ORDER — ACETAMINOPHEN 325 MG/1
325 TABLET ORAL EVERY 6 HOURS PRN
COMMUNITY
End: 2019-01-28

## 2018-07-11 RX ORDER — POLYETHYLENE GLYCOL 3350 17 G/17G
17 POWDER, FOR SOLUTION ORAL DAILY
COMMUNITY

## 2018-07-11 RX ORDER — CETIRIZINE HYDROCHLORIDE 10 MG/1
10 TABLET ORAL DAILY
COMMUNITY
End: 2019-04-30 | Stop reason: ALTCHOICE

## 2018-07-11 RX ORDER — SODIUM PHOSPHATE, DIBASIC AND SODIUM PHOSPHATE, MONOBASIC 7; 19 G/133ML; G/133ML
1 ENEMA RECTAL
COMMUNITY
End: 2018-10-19

## 2018-07-11 NOTE — PROGRESS NOTES
HPI:  Patient is a 75y o  year old RHD retired male who presents with chief complaint of Shoulder Pain  Patient complains of left shoulder pain  The symptoms began several years ago  Aggravating factors: no known event  Pain is located between the neck and shoulder and diffusely throughout the shoulder  Discomfort is described as aching, sharp/stabbing and throbbing  Symptoms are exacerbated by   Any movement  Therapy to date includes: prescription NSAIDS which are not very effective, physical therapy which was not very effective and corticosteroid injection which was not very effective  Patient had an MRI scan of the left shoulder performed 05/31/2018  Reviewed the images in office today  My reading is positive effusion and synovitis  No rotator cuff tear  Some muscle atrophy  Positive subacromial bursitis  Positive biceps tendon tear  Severe osteoarthritis of the glenohumeral joint with full-thickness cartilage loss of humeral head and glenoid and large osteophyte  There is also subchondral marrow edema    AC joint normal           ROS:   General: No fever, no chills, no weight loss, no weight gain  HEENT:  No loss of hearing, no nose bleeds, no sore throat  Eyes:  No eye pain, no red eyes, no visual disturbance  Respiratory:  No cough, no shortness of breath, no wheezing  Cardiovascular:  No chest pain, no palpitations, POSITIVE  edema  GI: No abdominal pain, no nausea, no vomiting  Endocrine: No frequent urination, no excessive thirst  Urinary:  No dysuria, no hematuria, no incontinence  Musculoskeletal: see HPI and PE  Skin:  No rash, no wounds  Neurological:  No dizziness, no headache, POSITIVE  Numbness, POSITIVE balance problems  Psychiatric:  No difficulty concentrating, no depression, no suicide thoughts, no anxiety, POSITIVE history of mental illness  Review of all other systems is negative    PMH:  Past Medical History:   Diagnosis Date    Anxiety     Arthritis     Atrial fibrillation ([facility] [address] )  CAD (coronary artery disease)     Cardiac disease     Cervicalgia     Chronic diastolic CHF (congestive heart failure) (HCC)     Chronic pain     Chronic pain disorder     [facility] obstructive [facility] disease) ([facility])     Hyperlipidemia     Hypertension     Myocardial infarction ([facility] [address] ) 1982, 1983, 1988    Neuropathy     Shortness of breath     Sleep apnea        PSH:  Past Surgical History:   Procedure Laterality Date    APPENDECTOMY      CARDIAC SURGERY      graft restented    CERVICAL FUSION  10/21/2015    CORONARY ARTERY BYPASS GRAFT  1983    NH COLONOSCOPY FLX DX W/COLLJ SPEC WHEN PFRMD N/A 6/21/2018    Procedure: COLONOSCOPY;  Surgeon: Nehemiah Bui MD;  Location: [facility];   Service: Gastroenterology    NH REPAIR OF HAMMERTOE,ONE Right 6/12/2018    Procedure: 2ND TOE OSTEOTOMY, HAMMERTOE CORRECTION WITH PIN AND SCREWS;  Surgeon: Alea Snowden DPM;  Location: [facility];  Service: Podiatry       Medications:  Current Outpatient Prescriptions   Medication Sig Dispense Refill    acetaminophen (TYLENOL) 325 mg tablet Take 325 mg by mouth every 6 (six) hours as needed for mild pain      apixaban (ELIQUIS) 5 mg Take 2 5 mg by mouth 2 (two) times a day        aspirin 81 mg chewable tablet Chew 1 tablet daily (Patient taking differently: Chew 81 mg daily  )  0    atorvastatin (LIPITOR) 40 mg tablet Take 40 mg by mouth daily        bisacodyl (DULCOLAX) 10 mg suppository Insert 10 mg into the rectum daily      budesonide-formoterol (SYMBICORT) 160-4 5 mcg/act inhaler Inhale 2 puffs 2 (two) times a day        carBAMazepine (TEGretol) 100 mg chewable tablet       cetirizine (ZyrTEC) 10 mg tablet Take 10 mg by mouth daily      clopidogrel (PLAVIX) 75 mg tablet Take 75 mg by mouth daily        docusate sodium (COLACE) 50 mg capsule Take 2 capsules by mouth 2 (two) times a day 10 capsule 0    DULoxetine (CYMBALTA) 60 mg delayed release capsule Take 60 mg by mouth daily        gabapentin (NEURONTIN) 300 mg capsule       gabapentin (NEURONTIN) 600 MG tablet       guaiFENesin (ROBITUSSIN) 100 MG/5ML oral liquid Take 100 mg by mouth 3 (three) times a day as needed for cough      ibuprofen (MOTRIN) 400 mg tablet Take 400 mg by mouth every 6 (six) hours as needed for mild pain      isosorbide mononitrate (IMDUR) 30 mg 24 hr tablet Take 30 mg by mouth daily        magnesium hydroxide ([employer] MILK OF MAGNESIA) 400 mg/5 mL oral suspension Take by mouth daily as needed for constipation      MELATONIN PO Take by mouth      Menthol (BENGAY ULTRA STRENGTH) 5 % PTCH Apply topically      menthol-cetylpyridinium (CEPACOL) 3 MG lozenge Take 1 lozenge by mouth as needed for sore throat      nitroglycerin (NITROSTAT) 0 4 mg SL tablet Place 0 4 mg under the tongue every 5 (five) minutes as needed for chest pain   polyethylene glycol (MIRALAX) powder Take 17 g by mouth daily      polyethylene glycol-propylene glycol (SYSTANE) 0 4-0 3 % 1 drop every 3 (three) hours as needed      QUEtiapine (SEROquel) 50 mg tablet Take 50 mg by mouth daily at bedtime        sodium phosphate-biphosphate (FLEET) 7-19 g 118 mL enema Insert 1 enema into the rectum      SPIRIVA HANDIHALER 18 MCG inhalation capsule       spironolactone (ALDACTONE) 25 mg tablet       torsemide (DEMADEX) 20 mg tablet Take 40 mg by mouth 2 (two) times a day        traZODone (DESYREL) 50 mg tablet        No current facility-administered medications for this visit          Allergies:  No Known Allergies    Family History:  Family History   Problem Relation Age of Onset    Heart failure Mother     Sudden death Father        Social History:  Social History     Occupational History    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[employer]      [employer]     Social History Main Topics    Smoking status: Former Smoker     Quit date: 1982    Smokeless tobacco: Never Used    Alcohol use No    Drug use: No    Sexual activity: Not on file       Physical Exam:  General : Alert, cooperative, no distress, appears stated age  Blood pressure 120/63, pulse 63, height 6' 3" (1 905 m), weight 90 1 kg (198 lb 9 6 oz)  Head:  Normocephalic, without obvious abnormality, atraumatic   Eyes:  Conjunctiva/corneas clear, EOM's intact,   Ears: Both ears normal appearance, no hearing deficits  Nose: Nares normal, septum midline, no drainage    Neck: Supple,  trachea midline, no adenopathy, no tenderness, no mass   Back:   Symmetric, no curvature, ROM normal, no tenderness   Lungs:   Respirations unlabored,  Clear to auscultation   Cardiacl:  Regular rate and rhythm   Extremities: Extremities normal, atraumatic, no cyanosis or edema      Pulses: 2+ and symmetric   Skin: Skin color, texture, turgor normal, no rashes or lesions      Neurologic: Evidence of cervical myelopathy  Patient demonstrates poor balance,  The patient has an unsteady gait  Upper extremity hand sensation is decreased to light touch ulnar greater than medial cyst distribution shoulder motor exam described below  Patient has 3- over 5 elbow extension bilaterally  Wrist extension is 4 5  Wrist flexion is 3+ over 5  Patient has intrinsic minus deformity of the hands and obvious weakness of hand intrinsics  Left Shoulder Exam     Tenderness   The patient is experiencing tenderness in the diffuse tenderness      Range of Motion   Active Abduction:                       50  Passive Abduction:                    60  Forward Flexion:                        50  External Rotation:                      10  Internal Rotation 0 degrees:      Sacrum  Internal Rotation 90 degrees:    N/t    Muscle Strength   Abduction:            4/5  Internal Rotation:  4/5  External Rotation: 4/5  Supraspinatus:     4/5  Subscapularis:     4/5  Biceps:                 4/5    Tests   Impingement:   N/t  Prasad:          N/t  Cross Arm:      Positive  Drop Arm:        N/t  Apprehension: Negative  Sulcus:            Negative    Comments: Shoulder shrug test is 5/5 for deltoid function        Examination of the cervical spine revealed minimal range of motion  Well-healed incision posterior neck  Positive paraspinal tenderness  Imaging Studies: The following imaging studies were reviewed in office today  My findings are noted  left shoulder x-rays 07/11/2018:   Severe end-stage osteoarthritis of the glenohumeral joint  No fracture  No subluxation  Assessment  Encounter Diagnoses   Name Primary?  Atrophy of muscle of left shoulder Yes    Subacromial bursitis of left shoulder joint     Left shoulder pain, unspecified chronicity     Pre-operative exam     Cervical spondylosis with myelopathy     Arthritis of left shoulder region          Plan: With regard to patient's left shoulder he has still very severe end-stage arthritis  There may be an inflammatory component to this  However patient does not have any systemic inflammatory disease diagnosis  Patient's rotator cuff tendons are intact  There is some evidence of motor atrophy but he does have active abduction and forward flexion and internal and external rotation although because of his limitation in range of motion of the shoulder it is difficult to tell how much strength  He has a good shoulder shrug and evidence of good deltoid function  Therefore despite his cervical myelopathy he could benefit from a total shoulder replacement  I do not think that he needs a reverse total shoulder replacement although the atrophy in his rotator cuff musculature is of some concern  That being said patient does have multiple medical problems that are serious  In addition to his cervical myelopathy he has a history of severe heart disease both heart failure and Atrial Fib  He has a history of pulmonary issues  And he has a history of chronic pain disorder    He will need to see his primary care physician and cardiologist and pulmonologist in possibly pain management physician to be cleared for surgery  We did discuss risks and benefits of total shoulder arthroplasty in detail today  We also discussed patient's increased risk from his other medical issues  Patient would like to proceed with surgery  He does have very severe osteoarthritis of the shoulder  We obtained consent from the patient today  for total shoulder arthroplasty of the left shoulder and tentatively scheduled surgery for the end of August   Again that will require medical clearance by his other treating physicians  We will also need to arrange for  Patient to discontinue Plavix and Eliquis a week before surgery

## 2018-07-12 PROBLEM — M25.512 LEFT SHOULDER PAIN: Status: ACTIVE | Noted: 2018-07-12

## 2018-07-12 PROBLEM — M19.012 ARTHRITIS OF LEFT SHOULDER REGION: Status: ACTIVE | Noted: 2018-07-12

## 2018-07-25 ENCOUNTER — HOSPITAL ENCOUNTER (OUTPATIENT)
Dept: CT IMAGING | Facility: HOSPITAL | Age: 68
Discharge: HOME | End: 2018-07-25
Attending: ORTHOPAEDIC SURGERY
Payer: MEDICARE

## 2018-07-25 DIAGNOSIS — Z01.818 PRE-OPERATIVE EXAM: ICD-10-CM

## 2018-07-25 PROCEDURE — 73200 CT UPPER EXTREMITY W/O DYE: CPT

## 2018-08-02 DIAGNOSIS — Z01.818 ENCOUNTER FOR PREADMISSION TESTING: Primary | ICD-10-CM

## 2018-08-03 ENCOUNTER — TELEPHONE (OUTPATIENT)
Dept: OBGYN CLINIC | Facility: CLINIC | Age: 68
End: 2018-08-03

## 2018-08-03 DIAGNOSIS — M19.012 ARTHRITIS OF LEFT SHOULDER REGION: Primary | ICD-10-CM

## 2018-08-03 NOTE — TELEPHONE ENCOUNTER
I called patient today to discuss his CT scan of the left shoulder  Patient has very severe erosive arthritis  There is severe wear not only of the humeral head but of the glenoid as well  In fact the glenoid is worn back to the level of the base of the coracoid  I am concerned that the glenoid would be insufficient for placement of a glenoid component  At least bone graft might be necessary  Also concerning is that with such significant wear instability could be an issue after joint replacement  We discussed that a reverse shoulder arthroplasty might be better in this instance given that he also has atrophy in his rotator cuff musculature although no significant rotator cuff tear  We also discussed that after reviewing the CT scan I think his case is a very difficult arthroplasty case and I would like to refer it to Dr Giuseppe Muniz who is [facility] shoulder specialist    Patient agrees with me that we will cancel the scheduled surgery for August 20th and he will follow up with Dr Giuseppe Muniz

## 2018-08-06 RX ORDER — BUDESONIDE AND FORMOTEROL FUMARATE DIHYDRATE 160; 4.5 UG/1; UG/1
AEROSOL RESPIRATORY (INHALATION)
COMMUNITY
Start: 2018-07-07 | End: 2018-08-08 | Stop reason: SDUPTHER

## 2018-08-06 NOTE — TELEPHONE ENCOUNTER
Patient called to follow up on being able to transfer his care to Dr Torrie Starr per Dr Preeti Forman  Best contact #[phone]    Thank you

## 2018-08-08 ENCOUNTER — OFFICE VISIT (OUTPATIENT)
Dept: CARDIOLOGY CLINIC | Facility: CLINIC | Age: 68
End: 2018-08-08
Payer: MEDICARE

## 2018-08-08 VITALS
BODY MASS INDEX: 25.17 KG/M2 | HEART RATE: 62 BPM | WEIGHT: 202.4 LBS | HEIGHT: 75 IN | DIASTOLIC BLOOD PRESSURE: 60 MMHG | SYSTOLIC BLOOD PRESSURE: 100 MMHG

## 2018-08-08 DIAGNOSIS — I48.91 ATRIAL FIBRILLATION, UNSPECIFIED TYPE (HCC): Primary | ICD-10-CM

## 2018-08-08 DIAGNOSIS — I10 HYPERTENSION, ESSENTIAL, BENIGN: ICD-10-CM

## 2018-08-08 DIAGNOSIS — I25.10 ASCVD (ARTERIOSCLEROTIC CARDIOVASCULAR DISEASE): ICD-10-CM

## 2018-08-08 DIAGNOSIS — E78.5 DYSLIPIDEMIA: ICD-10-CM

## 2018-08-08 PROCEDURE — 93000 ELECTROCARDIOGRAM COMPLETE: CPT | Performed by: INTERNAL MEDICINE

## 2018-08-08 PROCEDURE — 99214 OFFICE O/P EST MOD 30 MIN: CPT | Performed by: INTERNAL MEDICINE

## 2018-08-08 NOTE — PROGRESS NOTES
Cardiology   Ya Sal 75 y o  male MRN: [id number]        Impression:  1  Coronary artery disease s/p CABG and multiple PCI - Underwent PCI of LIMA and RCA 7/17  2  Hypertension - now normotensive  3  Dyslipidemia - on statin  4  Paroxysmal atrial fibrillation - in atrial fibrillation, on anticoagulation  5  Diastolic heart failure - compensated  6  Edema - reducing  7  Dyspnea - stable  8  Shoulder surgery - patient at acceptable cardiovascular risk to proceed with surgery  Recommendations:  1  Continue current medications  2  Discontinue Plavix  3  Hold aspirin 7 days prior to surgery  4  Hold Eliquis 3 days prior to surgery  5  Proceed with surgery  6  Follow up in 6 months  HPI: Ya Sal is a 75y o  year old male with coronary artery disease s/p CABG in 1983 and multiple PCI who presents for follow up  Recently in the hospital with chest pain, and PCI of RCA x 3 in June 2017, followed by PCI of LIMA-LAD  Breating is stable  Chest pain has resolved  Edema has resolved  Major complaint is left shoulder pain - is getting shoulder surgery  Review of Systems   Constitutional: Negative  HENT: Negative  Eyes: Negative  Respiratory: Negative for chest tightness and shortness of breath  Cardiovascular: Negative for chest pain, palpitations and leg swelling  Gastrointestinal: Negative  Endocrine: Negative  Genitourinary: Negative  Musculoskeletal: Negative  L Shoulder surgery   Skin: Negative  Allergic/Immunologic: Negative  Neurological: Negative  Hematological: Negative  Psychiatric/Behavioral: Negative  All other systems reviewed and are negative          Past Medical History:   Diagnosis Date    Anxiety     Arthritis     Atrial fibrillation (HCC)     CAD (coronary artery disease)     Cardiac disease     Cervicalgia     [facility]     Chronic pain     Chronic pain disorder  COPD (chronic obstructive pulmonary disease) (HCC)     Hyperlipidemia     Hypertension     Myocardial infarction ([facility] [address] ) 1982, 1983, 1988    Neuropathy     Shortness of breath     Sleep apnea      Past Surgical History:   Procedure Laterality Date    APPENDECTOMY      CARDIAC SURGERY      graft restented    CERVICAL FUSION  10/21/2015    CORONARY ARTERY BYPASS GRAFT  1983    MN COLONOSCOPY FLX DX W/COLLJ SPEC WHEN PFRMD N/A 6/21/2018    Procedure: COLONOSCOPY;  Surgeon: Gloria Jo MD;  Location: [facility];   Service: Gastroenterology    MN REPAIR OF HAMMERTOE,ONE Right 6/12/2018    Procedure: 2ND TOE OSTEOTOMY, HAMMERTOE CORRECTION WITH PIN AND SCREWS;  Surgeon: Lj Kim DPM;  Location: [facility];  Service: Podiatry     History   Alcohol Use No     History   Drug Use No     History   Smoking Status    Former Smoker    Quit date: 1982   Smokeless Tobacco    Never Used     Family History   Problem Relation Age of Onset    Heart failure Mother     Sudden death Father        Allergies:  No Known Allergies    Medications:     Current Outpatient Prescriptions:     acetaminophen (TYLENOL) 325 mg tablet, Take 325 mg by mouth every 6 (six) hours as needed for mild pain, Disp: , Rfl:     apixaban (ELIQUIS) 5 mg, Take 2 5 mg by mouth 2 (two) times a day  , Disp: , Rfl:     aspirin 81 mg chewable tablet, Chew 1 tablet daily (Patient taking differently: Chew 81 mg daily  ), Disp: , Rfl: 0    atorvastatin (LIPITOR) 40 mg tablet, Take 40 mg by mouth daily  , Disp: , Rfl:     bisacodyl (DULCOLAX) 10 mg suppository, Insert 10 mg into the rectum daily, Disp: , Rfl:     budesonide-formoterol (SYMBICORT) 160-4 5 mcg/act inhaler, Inhale 2 puffs 2 (two) times a day  , Disp: , Rfl:     carBAMazepine (TEGretol) 100 mg chewable tablet, , Disp: , Rfl:     cetirizine (ZyrTEC) 10 mg tablet, Take 10 mg by mouth daily, Disp: , Rfl:     docusate sodium (COLACE) 50 mg capsule, Take 2 capsules by mouth 2 (two) times a day, Disp: 10 capsule, Rfl: 0    DULoxetine (CYMBALTA) 60 mg delayed release capsule, Take 60 mg by mouth daily  , Disp: , Rfl:     gabapentin (NEURONTIN) 300 mg capsule, 300 mg 2 (two) times a day  , Disp: , Rfl:     gabapentin (NEURONTIN) 600 MG tablet, , Disp: , Rfl:     isosorbide mononitrate (IMDUR) 30 mg 24 hr tablet, Take 30 mg by mouth daily  , Disp: , Rfl:     magnesium hydroxide ([employer] MILK OF MAGNESIA) 400 mg/5 mL oral suspension, Take by mouth daily as needed for constipation, Disp: , Rfl:     MELATONIN PO, Take by mouth, Disp: , Rfl:     Menthol (BENGAY ULTRA STRENGTH) 5 % PTCH, Apply topically, Disp: , Rfl:     nitroglycerin (NITROSTAT) 0 4 mg SL tablet, Place 0 4 mg under the tongue every 5 (five) minutes as needed for chest pain , Disp: , Rfl:     polyethylene glycol (MIRALAX) powder, Take 17 g by mouth daily, Disp: , Rfl:     QUEtiapine (SEROquel) 50 mg tablet, Take 50 mg by mouth daily at bedtime  , Disp: , Rfl:     sodium phosphate-biphosphate (FLEET) 7-19 g 118 mL enema, Insert 1 enema into the rectum, Disp: , Rfl:     SPIRIVA HANDIHALER 18 MCG inhalation capsule, , Disp: , Rfl:     spironolactone (ALDACTONE) 25 mg tablet, , Disp: , Rfl:     torsemide (DEMADEX) 20 mg tablet, Take 40 mg by mouth 2 (two) times a day  , Disp: , Rfl:     traZODone (DESYREL) 50 mg tablet, , Disp: , Rfl:     guaiFENesin (ROBITUSSIN) 100 MG/5ML oral liquid, Take 100 mg by mouth 3 (three) times a day as needed for cough, Disp: , Rfl:     ibuprofen (MOTRIN) 400 mg tablet, Take 400 mg by mouth every 6 (six) hours as needed for mild pain, Disp: , Rfl:     menthol-cetylpyridinium (CEPACOL) 3 MG lozenge, Take 1 lozenge by mouth as needed for sore throat, Disp: , Rfl:     polyethylene glycol-propylene glycol (SYSTANE) 0 4-0 3 %, 1 drop every 3 (three) hours as needed, Disp: , Rfl:       Wt Readings from Last 3 Encounters:   08/08/18 91 8 kg (202 lb 6 4 oz)   07/11/18 90 1 kg (198 lb 9 6 oz) 06/25/18 87 1 kg (192 lb)     Temp Readings from Last 3 Encounters:   06/21/18 97 6 °F (36 4 °C) (Temporal)   06/12/18 (!) 97 4 °F (36 3 °C) (Temporal)   02/02/18 98 3 °F (36 8 °C)     BP Readings from Last 3 Encounters:   08/08/18 100/60   07/11/18 120/63   06/25/18 102/69     Pulse Readings from Last 3 Encounters:   08/08/18 62   07/11/18 63   06/25/18 62         Physical Exam   Constitutional: He is oriented to person, place, and time  He appears well-developed  HENT:   Head: Atraumatic  Eyes: EOM are normal    Neck: Normal range of motion  Cardiovascular: Normal rate  An irregularly irregular rhythm present  Exam reveals no gallop  No murmur heard  Trace LLE edema   Pulmonary/Chest: Effort normal and breath sounds normal  No respiratory distress  He has no wheezes  He has no rales  Abdominal: Soft  Musculoskeletal: Normal range of motion  Neurological: He is alert and oriented to person, place, and time  Skin: Skin is warm and dry  Psychiatric: He has a normal mood and affect           Laboratory Studies:  CMP:  Lab Results   Component Value Date     09/10/2017    K 3 4 (L) 09/10/2017     09/10/2017    CO2 26 09/10/2017    ANIONGAP 11 09/10/2017    BUN 13 09/10/2017    CREATININE 0 84 09/10/2017    GLUCOSE 111 09/10/2017    AST 17 08/27/2017    ALT 23 08/27/2017    BILITOT 0 70 08/27/2017    EGFR 90 09/10/2017       Lipid Profile:   Lab Results   Component Value Date    CHOL 128 07/16/2017     Lab Results   Component Value Date    HDL 58 07/16/2017     Lab Results   Component Value Date    LDLCALC 64 07/16/2017     Lab Results   Component Value Date    TRIG 30 07/16/2017       Cardiac testing:   EKG reviewed personally: Afib 62 [id number]  Results for orders placed during the hospital encounter of 06/25/17   Echo complete with contrast if indicated    [address]  [phone]    Transthoracic Echocardiogram  2D, M-mode, Doppler, and Color Doppler    Study date:  2017    Patient: Preeti Cote  MR number: [id number]  Account number: [de-identified]  : 1950  Age: 78 years  Gender: Male  Status: Inpatient  Location: Bedside  Height: 75 in  Weight: 194 lb  BP: 143/ 76 mmHg    Indications: Heart Failure    Diagnoses: I50 9 - Heart failure, unspecified    Sonographer:  YENY Alberto,RDCS  Interpreting Physician:  Obi Griffin MD  Referring Physician:  Leslie White DO  Group:  [facility]    SUMMARY    LEFT VENTRICLE:  Systolic function was normal  Ejection fraction was estimated to be 55 %  There were no regional wall motion abnormalities  Features were consistent with a pseudonormal left ventricular filling pattern, with concomitant abnormal relaxation and increased filling pressure (grade 2 diastolic dysfunction)  LEFT ATRIUM:  The atrium was mildly dilated  MITRAL VALVE:  There was mild regurgitation  TRICUSPID VALVE:  There was moderate regurgitation  PULMONIC VALVE:  There was mild regurgitation  IVC, HEPATIC VEINS:  The inferior vena cava was mildly dilated  Respirophasic changes in dimension were absent  HISTORY: PRIOR HISTORY: Congestive Heart Failure, Atrial Fibrillation, Coronary Artery Disease, Chest Pain, Edema, Hypertension, Pleural Effusion    PROCEDURE: The procedure was performed at the bedside  This was a routine study  The transthoracic approach was used  The study included complete 2D imaging, M-mode, complete spectral Doppler, and color Doppler  The heart rate was 66 bpm,  at the start of the study  Images were obtained from the parasternal, apical, subcostal, and suprasternal notch acoustic windows  Image quality was adequate  LEFT VENTRICLE: Size was normal  Systolic function was normal  Ejection fraction was estimated to be 55 %  There were no regional wall motion abnormalities   Wall thickness was normal  DOPPLER: Features were consistent with a pseudonormal  left ventricular filling pattern, with concomitant abnormal relaxation and increased filling pressure (grade 2 diastolic dysfunction)  RIGHT VENTRICLE: The size was normal  Systolic function was normal  Wall thickness was normal     LEFT ATRIUM: The atrium was mildly dilated  RIGHT ATRIUM: Size was normal     MITRAL VALVE: Valve structure was normal  There was normal leaflet separation  DOPPLER: The transmitral velocity was within the normal range  There was no evidence for stenosis  There was mild regurgitation  AORTIC VALVE: The valve was trileaflet  Leaflets exhibited normal thickness and normal cuspal separation  DOPPLER: Transaortic velocity was within the normal range  There was no evidence for stenosis  There was no regurgitation  TRICUSPID VALVE: The valve structure was normal  There was normal leaflet separation  DOPPLER: The transtricuspid velocity was within the normal range  There was no evidence for stenosis  There was moderate regurgitation  Estimated peak PA  pressure was 40 mmHg  The findings suggest moderate pulmonary hypertension  PULMONIC VALVE: Leaflets exhibited normal thickness, no calcification, and normal cuspal separation  DOPPLER: The transpulmonic velocity was within the normal range  There was mild regurgitation  PERICARDIUM: There was no pericardial effusion  The pericardium was normal in appearance  AORTA: The root exhibited normal size  SYSTEMIC VEINS: IVC: The inferior vena cava was normal in size and course  The inferior vena cava was mildly dilated  Respirophasic changes in dimension were absent      SYSTEM MEASUREMENT TABLES    2D  %FS: 25 6 %  Ao Diam: 3 5 cm  EDV(Teich): 134 5 ml  EF(Teich): 50 %  ESV(Teich): 67 3 ml  IVSd: 1 3 cm  LA Area: 22 1 cm2  LA Diam: 4 4 cm  LVEDV MOD A4C: 130 2 ml  LVEF MOD A4C: 60 5 %  LVESV MOD A4C: 51 4 ml  LVIDd: 5 3 cm  LVIDs: 3 9 cm  LVLd A4C: 9 3 cm  LVLs A4C: 8 cm  LVPWd: 1 1 cm  RA Area: 21 8 cm2  RVIDd: 3 8 cm  SV MOD A4C: 78 8 ml  SV(Teich): 67 2 ml    CW  TR Vmax: 3 m/s  TR maxP 5 mmHg    MM  TAPSE: 1 6 cm    PW  E': 0 m/s  E/E': 31 3  MV A Dar: 0 3 m/s  MV Dec Glynn: 7 2 m/s2  MV DecT: 177 8 ms  MV E Dar: 1 3 m/s  MV E/A Ratio: 4 6  MV PHT: 51 6 ms  MVA By PHT: 4 3 cm2    [employer] Accredited Echocardiography Laboratory    Prepared and electronically signed by    Anum Aden MD  Signed 2017 12:55:07       No results found for this or any previous visit  Results for orders placed during the hospital encounter of 17   Cardiac catheterization    Narrative [address]  [phone]    [facility]    Invasive Cardiovascular Lab Complete Report    Patient: Joanne Wheatley  MR number: [id number]  Account number: [de-identified]  Study date: 2017  Gender: Male  : 1950  Height: 75 2 in  Weight: 186 6 lb  BSA: 2 14 m squared    Allergies: NO ACTIVE ALLERGIES    Diagnostic Cardiologist:  Anum Aden MD  Interventional Cardiologist:  Anum Aden MD  Primary Physician:  Deepkia Almodovar    SUMMARY    CORONARY CIRCULATION:  Proximal LAD: There was a 100 % stenosis  Graft to the mid LAD: The graft was a LIMA from the aorta  There was a discrete 90 % stenosis at the distal anastomosis  There was BOLIVAR grade 3 flow through the graft (brisk flow)  This lesion is a likely culprit for the patient's anginal  symptoms  An intervention was performed  1ST LESION INTERVENTIONS:  A successful stent procedure was performed on the 90 % lesion in the distal anastomosis of the left internal mammary graft from the aorta to the mid LAD  Following intervention there was an excellent angiographic appearance with a 0 %  residual stenosis    A Vision Rx 3 0 X 15mm bare-metal stent was placed across the lesion and deployed at a maximum inflation pressure of 14 tabatha     INDICATIONS:  --  Possible CAD: unstable angina  PROCEDURES PERFORMED    --  Left coronary angiography  --  Right coronary angiography  --  LIMA graft angiography  --  Outpatient  --  Coronary Catheterization (w/o LHC, w/ Grafts  --  Mod Sedation Same Physician Initial 15min  --  Mod Sedation Same Physician Add 15min  --  Mod Sedation Same Physician Add 15min  --  CABG PCI (GERARDO, PTCRA, PTCA) Single  --  Intervention on LIMA (distal anastomosis) from the aorta to mid LAD: stent  PROCEDURE: The risks and alternatives of the procedures and conscious sedation were explained to the patient and informed consent was obtained  The patient was brought to the cath lab and placed on the table  The planned puncture sites  were prepped and draped in the usual sterile fashion  --  Right femoral artery access  The puncture site was infiltrated with local anesthetic  The vessel was accessed using the modified Seldinger technique, a wire was advanced into the vessel, and a sheath was advanced over the wire into the  vessel  --  Left coronary artery angiography  A catheter was advanced over a guidewire into the aorta and positioned in the left coronary artery ostium under fluoroscopic guidance  Angiography was performed  --  Right coronary artery angiography  A catheter was advanced over a guidewire into the aorta and positioned in the right coronary artery ostium under fluoroscopic guidance  Angiography was performed  --  Left internal mammary graft angiography  A catheter was advanced to the aorta and positioned in the left subclavian artery over a guidewire under fluoroscopic guidance  Angiography was performed  --  Outpatient  --  Coronary Catheterization (w/o LHC, w/ Grafts  --  Mod Sedation Same Physician Initial 15min  --  Mod Sedation Same Physician Add 15min  --  Mod Sedation Same Physician Add 15min      LESION INTERVENTION: A successful stent procedure was performed on the 90 % lesion in the distal anastomosis of the left internal mammary graft from the aorta to the mid LAD  Following intervention there was an excellent angiographic  appearance with a 0 % residual stenosis  This was an ACC/AHA type B "moderate risk" lesion for intervention  There was BOLIVAR 3 flow before the procedure and BOLIVAR 3 flow after the procedure  --  Vessel setup was performed  A 6Fr  Runway IM 85CM guiding catheter was used to cannulate the vessel  --  Vessel setup was performed  A Runthrough NS 180cm wire was used to cross the lesion  --  Balloon angioplasty was performed, using a Trek Rx 2 5 x 15mm balloon, with 2 inflations and a maximum inflation pressure of 12 tabatha  --  A Vision Rx 3 0 X 15mm bare-metal stent was placed across the lesion and deployed at a maximum inflation pressure of 14 tabatha  --  Balloon angioplasty was performed, using a NC Trek Rx 3 0 x 15mm balloon, with 1 inflations and a maximum inflation pressure of 20 tabatha  INTERVENTIONS:  --  CABG PCI (GERARDO, PTCRA, PTCA) Single  PROCEDURE COMPLETION: The patient tolerated the procedure well and was discharged from the cath lab  TIMING: Test started at 11:41  Test concluded at 12:12  HEMOSTASIS: The sheath was removed over a wire and the Angioseal delivery sheath  was inserted into the femoral artery  Hemostasis was obtained using a closure device ( Angioseal) deployed through the delivery sheath  MEDICATIONS GIVEN: Plavix 75mg, 75 mg, PO, at 11:00  Nitroglycerine (1/150), 0 4 mcg, SL, at 11:03  Nitroglycerine (1/150), 0 4 mcg, SL, at 11:08  Versed (2mg/2ml), 2 mg, IV, at 11:37  Fentanyl (1OOmcg/2 ml), 50 mcg, IV, at 11:37  1% Lidocaine, 10 ml, subcutaneously, at 11:42  Angiomax Bolus(250mg/50ml), 12 8 ml, IV, at 11:50  Angiomax  Drip (250mg/50ml), infusion rate of 29 7 ml/hr, IV, at 12:09  Fentanyl (1OOmcg/2 ml), 25 mcg, IV, at 11:53  Versed (2mg/2ml), 1 mg, IV, at 11:53  Versed (2mg/2ml), 1 mg, IV, at 12:03   Fentanyl (1OOmcg/2 ml), 25 mcg, IV, at 12:03  Plavix  75mg, 225 mg, PO, at 12:08  CONTRAST GIVEN: 140 ml Omnipaque (350 mg I /ml)  RADIATION EXPOSURE: Fluoroscopy time: 7 93 min  HEMODYNAMICS: Hemodynamic assessment demonstrated normal LVEDP  VALVES:  AORTIC VALVE:   --  There was no aortic stenosis  CORONARY VESSELS:   --  Proximal left main: There was a 30 % stenosis  --  Proximal LAD: There was a 100 % stenosis  --  Circumflex: The vessel was normal sized  Angiography showed moderate atherosclerosis  --  Mid RCA: stent patent  --  Distal RCA: stents patent  --  Right PDA: The vessel was normal sized  Angiography showed minor luminal irregularities  --  Right posterolateral segment: The vessel was normal sized  Angiography showed minor luminal irregularities  --  Graft to the mid LAD: The graft was a LIMA from the aorta  There was a discrete 90 % stenosis at the distal anastomosis  There was BOLIVAR grade 3 flow through the graft (brisk flow)  This lesion is a likely culprit for the patient's  anginal symptoms  An intervention was performed  RECOMMENDATIONS  Add aspirin, 81 mg  Add clopidogrel (Plavix), 75 mg  Prepared and signed by    Juana George MD  Signed 07/13/2017 12:21:12    Study diagram    Angiographic findings  Native coronary lesions:  ·Proximal left main: Lesion 1: 30 % stenosis  ·Proximal LAD: Lesion 1: 100 % stenosis  Coronary graft lesions:  ·Graft to mid LAD: LIMA  · 90 % stenosis at distal anastomosis, discrete, culprit lesion  Intervention results  Coronary graft lesions:  ·Successful stent of the 90 % stenosis in LIMA (distal anastomosis) from the aorta to mid LAD  Appearance excellent with 0 % residual stenosis   Stent: Vision Rx 3 0 X 15mm bare-metal     Hemodynamic tables    Pressures:  Baseline  Pressures:  - HR: 64  Pressures:  - Rhythm:  Pressures:  -- Aortic Pressure (S/D/M): 116/61/82  Pressures:  -- Left Ventricle (s/edp): 117/9/--    Outputs:  Baseline  Outputs:  -- CALCULATIONS[age]  Outputs:  -- CALCULATIONS: Body Surface Area: 2 14  Outputs:  -- CALCULATIONS: Height in cm: 191 00  Outputs:  -- CALCULATIONS: Sex: Male  Outputs:  -- CALCULATIONS: Weight in k 80

## 2018-08-08 NOTE — PATIENT INSTRUCTIONS
Recommendations:  1  Continue current medications  2  Discontinue Plavix  3  Hold aspirin 7 days prior to surgery  4  Hold Eliquis 3 days prior to surgery  5  Proceed with surgery  6  Follow up in 6 months

## 2018-08-10 ENCOUNTER — OFFICE VISIT (OUTPATIENT)
Dept: FAMILY MEDICINE CLINIC | Facility: CLINIC | Age: 68
End: 2018-08-10
Payer: MEDICARE

## 2018-08-10 VITALS
BODY MASS INDEX: 25.12 KG/M2 | HEIGHT: 75 IN | OXYGEN SATURATION: 95 % | SYSTOLIC BLOOD PRESSURE: 104 MMHG | WEIGHT: 202 LBS | HEART RATE: 78 BPM | DIASTOLIC BLOOD PRESSURE: 60 MMHG | TEMPERATURE: 97.9 F

## 2018-08-10 DIAGNOSIS — I25.10 ASCVD (ARTERIOSCLEROTIC CARDIOVASCULAR DISEASE): ICD-10-CM

## 2018-08-10 DIAGNOSIS — I48.0 PAROXYSMAL ATRIAL FIBRILLATION (HCC): ICD-10-CM

## 2018-08-10 DIAGNOSIS — Z01.818 PREOP EXAMINATION: Primary | ICD-10-CM

## 2018-08-10 DIAGNOSIS — M19.012 PRIMARY OSTEOARTHRITIS OF LEFT SHOULDER: ICD-10-CM

## 2018-08-10 PROCEDURE — 99214 OFFICE O/P EST MOD 30 MIN: CPT | Performed by: FAMILY MEDICINE

## 2018-08-10 NOTE — PROGRESS NOTES
Subjective:     Suzi Alegria is a 75 y o  male who presents to the office today for a preoperative consultation at the request of surgeon Dr Yamilet Chester who plans on performing Shoulder replacement on - not scheduled yet  Planned anesthesia: general  The patient has the following known anesthesia issues: NONE  Patients bleeding risk: no recent abnormal bleeding  Patient does not have objections to receiving blood products if needed  The following portions of the patient's history were reviewed and updated as appropriate: He  has a past medical history of Anxiety; Arthritis; Atrial fibrillation ([facility] [address] ); CAD (coronary artery disease); Cardiac disease; Cervicalgia; Chronic diastolic CHF (congestive heart failure) ([address] ); Chronic pain; Chronic pain disorder; COPD (chronic obstructive pulmonary disease) ([address] ); Hyperlipidemia; Hypertension; Myocardial infarction ([facility] [address] ) (1982, 1983, 1988); Neuropathy; Shortness of breath; and Sleep apnea    He   Patient Active Problem List    Diagnosis Date Noted    Primary osteoarthritis of left shoulder 08/10/2018    Arthritis of left shoulder region 07/12/2018    Left shoulder pain 07/12/2018    Screening for colon cancer 06/05/2018    Olecranon bursitis of right elbow 02/02/2018    Pressure ulcer of left elbow 11/13/2017    COPD (chronic obstructive pulmonary disease) ([facility])     ASCVD (arteriosclerotic cardiovascular disease)     Chronic combined systolic and diastolic CHF (congestive heart failure) ([facility])     Paroxysmal atrial fibrillation ([address] )     Coronary artery disease involving native coronary artery     Chronic pain disorder 11/07/2016    Gait instability 10/20/2016    Chronic atrial fibrillation ([facility] [address] ) 08/18/2016    Hypertension, essential, benign 08/18/2016    Insomnia 01/29/2016    Anxiety 08/03/2015    Cervical spondylosis with myelopathy 06/16/2015    Dyslipidemia 03/17/2015    Depression 05/14/2012     He  has a past surgical history that includes Cardiac surgery; Coronary artery bypass graft (6482); Cervical fusion (10/21/2015); Appendectomy; pr repair of beth,one (Right, 6/12/2018); and pr colonoscopy flx dx w/collj spec when pfrmd (N/A, 6/21/2018)  His family history includes Heart failure in his mother; Sudden death in his father  He  reports that he quit smoking about 36 years ago  He has never used smokeless tobacco  He reports that he does not drink alcohol or use drugs    Current Outpatient Prescriptions   Medication Sig Dispense Refill    acetaminophen (TYLENOL) 325 mg tablet Take 325 mg by mouth every 6 (six) hours as needed for mild pain      apixaban (ELIQUIS) 5 mg Take 2 5 mg by mouth 2 (two) times a day        aspirin 81 mg chewable tablet Chew 1 tablet daily (Patient taking differently: Chew 81 mg daily  )  0    atorvastatin (LIPITOR) 40 mg tablet Take 40 mg by mouth daily        bisacodyl (DULCOLAX) 10 mg suppository Insert 10 mg into the rectum daily      budesonide-formoterol (SYMBICORT) 160-4 5 mcg/act inhaler Inhale 2 puffs 2 (two) times a day        carBAMazepine (TEGretol) 100 mg chewable tablet       cetirizine (ZyrTEC) 10 mg tablet Take 10 mg by mouth daily      docusate sodium (COLACE) 50 mg capsule Take 2 capsules by mouth 2 (two) times a day 10 capsule 0    DULoxetine (CYMBALTA) 60 mg delayed release capsule Take 60 mg by mouth daily        gabapentin (NEURONTIN) 300 mg capsule 300 mg 2 (two) times a day        gabapentin (NEURONTIN) 600 MG tablet       guaiFENesin (ROBITUSSIN) 100 MG/5ML oral liquid Take 100 mg by mouth 3 (three) times a day as needed for cough      ibuprofen (MOTRIN) 400 mg tablet Take 400 mg by mouth every 6 (six) hours as needed for mild pain      isosorbide mononitrate (IMDUR) 30 mg 24 hr tablet Take 30 mg by mouth daily        magnesium hydroxide ([employer] MILK OF MAGNESIA) 400 mg/5 mL oral suspension Take by mouth daily as needed for constipation      MELATONIN PO Take by mouth      Menthol (BENGAY ULTRA STRENGTH) 5 % PTCH Apply topically      menthol-cetylpyridinium (CEPACOL) 3 MG lozenge Take 1 lozenge by mouth as needed for sore throat      nitroglycerin (NITROSTAT) 0 4 mg SL tablet Place 0 4 mg under the tongue every 5 (five) minutes as needed for chest pain   polyethylene glycol (MIRALAX) powder Take 17 g by mouth daily      polyethylene glycol-propylene glycol (SYSTANE) 0 4-0 3 % 1 drop every 3 (three) hours as needed      QUEtiapine (SEROquel) 50 mg tablet Take 50 mg by mouth daily at bedtime        sodium phosphate-biphosphate (FLEET) 7-19 g 118 mL enema Insert 1 enema into the rectum      SPIRIVA HANDIHALER 18 MCG inhalation capsule       spironolactone (ALDACTONE) 25 mg tablet       torsemide (DEMADEX) 20 mg tablet Take 40 mg by mouth 2 (two) times a day        traZODone (DESYREL) 50 mg tablet        No current facility-administered medications for this visit        Current Outpatient Prescriptions on File Prior to Visit   Medication Sig    acetaminophen (TYLENOL) 325 mg tablet Take 325 mg by mouth every 6 (six) hours as needed for mild pain    apixaban (ELIQUIS) 5 mg Take 2 5 mg by mouth 2 (two) times a day      aspirin 81 mg chewable tablet Chew 1 tablet daily (Patient taking differently: Chew 81 mg daily  )    atorvastatin (LIPITOR) 40 mg tablet Take 40 mg by mouth daily      bisacodyl (DULCOLAX) 10 mg suppository Insert 10 mg into the rectum daily    budesonide-formoterol (SYMBICORT) 160-4 5 mcg/act inhaler Inhale 2 puffs 2 (two) times a day      carBAMazepine (TEGretol) 100 mg chewable tablet     cetirizine (ZyrTEC) 10 mg tablet Take 10 mg by mouth daily    docusate sodium (COLACE) 50 mg capsule Take 2 capsules by mouth 2 (two) times a day    DULoxetine (CYMBALTA) 60 mg delayed release capsule Take 60 mg by mouth daily      gabapentin (NEURONTIN) 300 mg capsule 300 mg 2 (two) times a day      gabapentin (NEURONTIN) 600 MG tablet     guaiFENesin (ROBITUSSIN) 100 MG/5ML oral liquid Take 100 mg by mouth 3 (three) times a day as needed for cough    ibuprofen (MOTRIN) 400 mg tablet Take 400 mg by mouth every 6 (six) hours as needed for mild pain    isosorbide mononitrate (IMDUR) 30 mg 24 hr tablet Take 30 mg by mouth daily      magnesium hydroxide ([employer] MILK OF MAGNESIA) 400 mg/5 mL oral suspension Take by mouth daily as needed for constipation    MELATONIN PO Take by mouth    Menthol (BENGAY ULTRA STRENGTH) 5 % PTCH Apply topically    menthol-cetylpyridinium (CEPACOL) 3 MG lozenge Take 1 lozenge by mouth as needed for sore throat    nitroglycerin (NITROSTAT) 0 4 mg SL tablet Place 0 4 mg under the tongue every 5 (five) minutes as needed for chest pain   polyethylene glycol (MIRALAX) powder Take 17 g by mouth daily    polyethylene glycol-propylene glycol (SYSTANE) 0 4-0 3 % 1 drop every 3 (three) hours as needed    QUEtiapine (SEROquel) 50 mg tablet Take 50 mg by mouth daily at bedtime      sodium phosphate-biphosphate (FLEET) 7-19 g 118 mL enema Insert 1 enema into the rectum    SPIRIVA HANDIHALER 18 MCG inhalation capsule     spironolactone (ALDACTONE) 25 mg tablet     torsemide (DEMADEX) 20 mg tablet Take 40 mg by mouth 2 (two) times a day      traZODone (DESYREL) 50 mg tablet      No current facility-administered medications on file prior to visit  He has No Known Allergies       Review of Systems  Constitutional: negative  Eyes: negative  Ears, nose, mouth, throat, and face: negative  Respiratory: negative  Cardiovascular: negative for chest pain  Gastrointestinal: negative  Genitourinary:negative  Integument/breast: negative  Hematologic/lymphatic: negative  Musculoskeletal:positive for arthralgias and shoulder pain and decreased ROM  Neurological: negative  Behavioral/Psych: negative  Endocrine: negative  Allergic/Immunologic: negative     Objective:     /60   Pulse 78   Temp 97 9 °F (36 6 °C)   Ht 6' 3" (1 905 m)   Wt 91 6 kg (202 lb) SpO2 95%   BMI 25 25 kg/m²   General appearance: alert and oriented, in no acute distress  Eyes: conjunctivae/corneas clear  PERRL, EOM's intact  Fundi benign  Throat: lips, mucosa, and tongue normal; teeth and gums normal  Lungs: clear to auscultation bilaterally  Heart: Irregularly irregular  Extremities: edema +2 bilateral feet/ankles  L shoulder reduced ROM and pain  Neurologic: Grossly normal      Cardiographics  ECG: A Fib - no changes  Dr Nayana Siddiqi saw him on 8/8/18    Imaging  Chest x-ray: Pending on 8/15/18 per Dr Hailey Vigil    Lab Review   Pending - waiting on surgery date  Assessment:     75 y o  male with planned surgery as above  Known risk factors for perioperative complications: Coronary disease  Chronic pulmonary disease    Difficulty with intubation is not anticipated  Cardiac Risk Estimation: per Dr Nayana Siddiqi  Plan:     1  Preoperative workup as follows: labs are ordered and he will get these done when he has a surgery date  CXR will also be done  2  Change in medication regimen before surgery: hold anticoagulants per Dr Jenny Franco recommendations  3  Medically stable for surgery - labs and CXR pending

## 2018-09-06 ENCOUNTER — OFFICE VISIT (OUTPATIENT)
Dept: OBGYN CLINIC | Facility: OTHER | Age: 68
End: 2018-09-06
Payer: MEDICARE

## 2018-09-06 VITALS
DIASTOLIC BLOOD PRESSURE: 60 MMHG | BODY MASS INDEX: 25.37 KG/M2 | WEIGHT: 203 LBS | HEART RATE: 73 BPM | SYSTOLIC BLOOD PRESSURE: 105 MMHG

## 2018-09-06 DIAGNOSIS — M19.012 ARTHRITIS OF LEFT SHOULDER REGION: Primary | ICD-10-CM

## 2018-09-06 DIAGNOSIS — M75.41 SUBACROMIAL IMPINGEMENT OF RIGHT SHOULDER: ICD-10-CM

## 2018-09-06 DIAGNOSIS — M47.12 CERVICAL SPONDYLOSIS WITH MYELOPATHY: ICD-10-CM

## 2018-09-06 PROCEDURE — 20610 DRAIN/INJ JOINT/BURSA W/O US: CPT | Performed by: ORTHOPAEDIC SURGERY

## 2018-09-06 PROCEDURE — 99214 OFFICE O/P EST MOD 30 MIN: CPT | Performed by: ORTHOPAEDIC SURGERY

## 2018-09-06 RX ORDER — QUETIAPINE FUMARATE 25 MG/1
25 TABLET, FILM COATED ORAL
COMMUNITY
Start: 2018-08-23 | End: 2019-01-28 | Stop reason: SDUPTHER

## 2018-09-06 RX ORDER — BETAMETHASONE SODIUM PHOSPHATE AND BETAMETHASONE ACETATE 3; 3 MG/ML; MG/ML
6 INJECTION, SUSPENSION INTRA-ARTICULAR; INTRALESIONAL; INTRAMUSCULAR; SOFT TISSUE
Status: COMPLETED | OUTPATIENT
Start: 2018-09-06 | End: 2018-09-06

## 2018-09-06 RX ORDER — BUPIVACAINE HYDROCHLORIDE 2.5 MG/ML
2 INJECTION, SOLUTION INFILTRATION; PERINEURAL
Status: COMPLETED | OUTPATIENT
Start: 2018-09-06 | End: 2018-09-06

## 2018-09-06 RX ORDER — APIXABAN 2.5 MG/1
TABLET, FILM COATED ORAL
Status: ON HOLD | COMMUNITY
Start: 2018-08-27 | End: 2018-11-06

## 2018-09-06 RX ADMIN — BUPIVACAINE HYDROCHLORIDE 2 ML: 2.5 INJECTION, SOLUTION INFILTRATION; PERINEURAL at 09:46

## 2018-09-06 RX ADMIN — BETAMETHASONE SODIUM PHOSPHATE AND BETAMETHASONE ACETATE 6 MG: 3; 3 INJECTION, SUSPENSION INTRA-ARTICULAR; INTRALESIONAL; INTRAMUSCULAR; SOFT TISSUE at 09:46

## 2018-09-06 NOTE — PROGRESS NOTES
Assessment  Diagnoses and all orders for this visit:    Arthritis of left shoulder region  -     Ambulatory referral to Orthopedic Surgery  -     CT shoulder left wo contrast; Future    Cervical spondylosis with myelopathy    Subacromial impingement of right shoulder  -     Large joint arthrocentesis    Discussion and Plan:    - Right Subacromial CS injection performed at time of visit  - CT Scan w/ [facility] Blueprint Protocol of left shoulder  - Will consider for Reverse Total Shoulder Arthroplasty pending results of diagnostic imaging  -  Return 1-2 weeks after CT Scan is complete, for Diagnostic Test Review and Discuss surgical plan  Subjective:   Patient ID: Niall Stallworth is a 70 y o  RHD male      Patient presents today for evaluation of advanced osteoarthritis of the left shoulder under referral from Dr Donna Kim  He reports that he has previously been treated conservatively with cortisone injections, physical therapy, and oral medications which have been unable to restore his range of motion or resolve his pain  He reports in the history of ongoing, bilateral, upper extremity neuropathy which is a result of complications from previous C-spine surgery  He reports an inability to lift his arm above shoulder height, or to reach out to his side or behind his back  He describes his pain is mostly anterior, achy/intense, and is exacerbated by increased activity  He also reports grinding in the shoulder joint with motion        The following portions of the patient's history were reviewed and updated as appropriate: allergies, current medications, past family history, past medical history, past social history, past surgical history and problem list     Review of Systems    Objective:    Vitals:    09/06/18 0922   BP: 105/60   BP Location: Right arm   Patient Position: Sitting   Cuff Size: Adult   Pulse: 73   Weight: 92 1 kg (203 lb)       Left Shoulder Exam     Muscle Strength   External Rotation: 4/5  Supraspinatus:     4/5  Subscapularis:     4/5    Right Shoulder Exam     Range of Motion   Active Abduction:                       100  Passive Abduction:                    160  Forward Flexion:                        100  External Rotation:                      50  Internal Rotation 0 degrees:      Lumbar    Muscle Strength   External Rotation: 4/5  Supraspinatus:     4/5  Subscapularis:     4/5    Tests   Impingement:   Positive  Prasad:          Positive  Cross Arm:      Positive  Drop Arm:        Positive  Apprehension: Negative  Sulcus:            Negative    Comments:  Patient presents with apparent muscle wasting of bilateral upper extremities that includes the upper trapezius, deltoids, periscapular musculature and muscles of the upper arms, forearms, and hands      + Shrug sign  + resisted external rotation  + resisted shoulder elevation          Physical Exam     Large joint arthrocentesis  Date/Time: 9/6/2018 9:46 AM  Consent given by: patient  Site marked: site marked  Timeout: Immediately prior to procedure a time out was called to verify the correct patient, procedure, equipment, support staff and site/side marked as required   Supporting Documentation  Indications: pain   Procedure Details  Location: shoulder - R subacromial bursa  Preparation: Patient was prepped and draped in the usual sterile fashion  Needle size: 22 G  Ultrasound guidance: no  Approach: lateral  Medications administered: 2 mL bupivacaine 0 25 %; 6 mg betamethasone acetate-betamethasone sodium phosphate 6 (3-3) mg/mL    Patient tolerance: patient tolerated the procedure well with no immediate complications  Dressing:  Sterile dressing applied      Attending Physician has personally reviewed pertinent films in PACS, interpretation is as follows:    CT SCAN LEFT SHOULDER SHOWS ADVANCED GLENOHUMERAL OSTEOARTHRITIS WITH ROTATOR CUFF ATROPHY     THERE IS SEVERE MEDIAL WEAR WHICH MAY PRECLUDE IMPLANTATION OF A GLENOID COMPONENT OF A REVERSE TOTAL SHOULDER ARTHROPLASTY        Scribe Attestation    I,:   Jennie Blackmarciaflorian am acting as a scribe while in the presence of the attending physician :        I,:   Grace Manriquez MD personally performed the services described in this documentation    as scribed in my presence :          Patient has severe wear of his left shoulder and reverse total shoulder arthroplasty is the only surgical option available, a CT scan with blue print protocol is required for me to plan whether not implantation of a glenoid is possible, bone graft likely be required from the humeral head  He does understand that treatment with reverse total shoulder will not help his neurologic condition which is likely related to his cervical myelopathy

## 2018-09-06 NOTE — PATIENT INSTRUCTIONS

## 2018-09-13 ENCOUNTER — HOSPITAL ENCOUNTER (OUTPATIENT)
Dept: CT IMAGING | Facility: HOSPITAL | Age: 68
Discharge: HOME | End: 2018-09-13
Attending: ORTHOPAEDIC SURGERY
Payer: MEDICARE

## 2018-09-13 DIAGNOSIS — M19.012 ARTHRITIS OF LEFT SHOULDER REGION: ICD-10-CM

## 2018-09-13 PROCEDURE — 73200 CT UPPER EXTREMITY W/O DYE: CPT

## 2018-10-02 ENCOUNTER — TELEPHONE (OUTPATIENT)
Dept: PREADMISSION TESTING | Facility: HOSPITAL | Age: 68
End: 2018-10-02

## 2018-10-02 ENCOUNTER — OFFICE VISIT (OUTPATIENT)
Dept: OBGYN CLINIC | Facility: OTHER | Age: 68
End: 2018-10-02
Payer: MEDICARE

## 2018-10-02 VITALS
SYSTOLIC BLOOD PRESSURE: 117 MMHG | DIASTOLIC BLOOD PRESSURE: 67 MMHG | BODY MASS INDEX: 26.1 KG/M2 | HEART RATE: 64 BPM | WEIGHT: 208.8 LBS

## 2018-10-02 DIAGNOSIS — M19.012 PRIMARY OSTEOARTHRITIS OF LEFT SHOULDER: Primary | ICD-10-CM

## 2018-10-02 PROCEDURE — 99214 OFFICE O/P EST MOD 30 MIN: CPT | Performed by: ORTHOPAEDIC SURGERY

## 2018-10-02 PROCEDURE — 1123F ACP DISCUSS/DSCN MKR DOCD: CPT | Performed by: PHYSICIAN ASSISTANT

## 2018-10-02 NOTE — PATIENT INSTRUCTIONS
What to Expect Before and After Shoulder Replacement Surgery  You are being scheduled for a shoulder replacement by Dr Pina Baldwin to treat your shoulder condition  Here is some information which may help to answer questions that you may have  Please do not hesitate to reach out to our team to answer questions not addressed here  Before Surgery  You will be contacted the evening prior to your surgery to confirm the scheduled time of the procedure and when to arrive at the hospital    Do not eat or drink anything after midnight the night before your surgery so that the anesthesia can be performed safely  If you have been fitted for a sling in the office prior to the surgery please remember to bring it to the hospital   You will meet the anesthesiologist the morning of the surgery  The surgery is performed under a general anesthetic but they will also offer you a regional block (shot to numb the arm) to help control your post-operative pain as well as with a catheter that is left in place after the block  The catheter is connected to a small pump which will continue to provide numbing medicine and help prolong the pain control from the block  Unfortunately this catheter is not as effective as the initial block, but can still be very helpful in managing the pain  After Surgery and in the Hospital  The shoulder replacement surgery typically takes 60-90 minutes  When surgery is completed, your surgeon will update your family and friends on your condition and progress  You will remain in the recovery room for at least an hour or until the anesthesia has worn off and your blood pressure and pulse are stable  If you have pain, the nurses will give you medication  Once out of surgery, your surgeon will decide on how long you will be using a sling in order to protect and position your shoulder  However, this won't keep you from starting physical therapy    Exercises typically begin on the day after surgery with emphasis on moving the shoulder, wrist, and hand  The physical therapist will be provided with a detailed protocol but typically the first 6 weeks are used to regain range of motion and then strengthening is initiated  Starting strengthening exercises too early may lead to complications  When You Are Discharged from the [address] [place] [address] can expect to be released from the hospital the day after surgery (this may change if you have special needs or medical conditions)  Before you are released, the treatment team (Orthopaedic surgery residents, physician assistants and physical therapists) will talk with you about the importance of limiting any sudden or stressful movements to the arm for several weeks or longer  Activities that involve pushing, pulling, and lifting should not be done until you are given permission from your surgeon  Your First Day at [address] [place] [address] may need help with your daily activities, so it is a good idea to have family and friends prepared to help you  It is okay to remove the sling and let the arm hang at the side so that you can get cleaned and change your clothes  To put on a shirt, place the bad arm in first and then the good arm  Reverse to take it off  Don't forget to wear the sling every night for at least the first month after surgery, and never use your arm to push yourself up in bed or from a chair  The added weight on your shoulder may cause you to re-injure the joint  How to [place] in the First Week  You are encouraged to return to your normal eating and sleeping patterns as soon as possible  It is important for you to be active in order to control your weight and muscle tone  It is ok to increase your activity level and even perform light aerobic exercise (like walking or riding a stationary bike) within the first week or so if you are feeling up to it    If there is concern about these activates best to wait until the first post-operative visit and discuss this with the team    Mabel Wyman might be able to return to work within several days if you can perform your job while wearing a sling  Consult with your doctor, as this differs from patient to patient  However, if your job requires heavy lifting or climbing, there may be a delay for several months  Until you are seen for your first follow-up visit, please try and keep wound dry  It is okay to shower but try your best to keep the incision out of direct contact with the water (or consider using a waterproof bandage)  If it does gets wet please dry as best as possible afterwards  If you notice any drainage or a foul odor from your incision or your temperature goes above 101 5 degrees, please contact the office  What You Can Expect in the First Month  Your first post-operative appointment will be with Dr Agustin Mack physician assistant (PA) around 2 weeks after the surgery  You skin staples will be removed and X-rays will be obtained at that visit  Please understand that it is quite common to still experience pain at that time but the pain should be steadily improving  Hopefully physical therapy has already begun but if not it will be initiated at this visit and will continue for the 8-12 weeks  At about 12 weeks after surgery you will start a progressive strengthening program  Physical therapy is a deliberate process of not only strengthening your shoulder but also altering how you use your arm  It may be many months before your desired results are achieved, so do not get discouraged  Your shoulder will generally continue to improve steadily up to 6-8 months after surgery  After that point further improvement is very slow; although it has been shown that even after a year or more, activity can increase as muscle strength continues to improve  After the First Month at Home   Because each person heals differently, there are different recovery timelines  An average recovery period typically lasts about between 3-6 months  Talk with your surgeon about which activities will be appropriate for you once you have recovered

## 2018-10-02 NOTE — PROGRESS NOTES
Assessment  Diagnoses and all orders for this visit:    Primary osteoarthritis of left shoulder        Discussion and Plan:    The patient does appear to have a glenoid which I can implant and reconstructed with a reverse base plate and does wish to proceed forward with reverse total shoulder arthroplasty of his left shoulder  A thorough discussion was performed with the patient reviewing all operative and nonoperative options as well as the risks of the procedure  Risks discussed include but not limited to persistent pain, dislocation, loosening of the prosthesis, infection, need for further surgery including revision, any remaining rotator cuff rupture , neurovascular injury, as well as the risk of anesthesia  After this discussion all questions were answered and informed consent was obtained for Reverse Total Shoulder Arthroplasty of the left shoulder  He does understand that his shoulder is significantly worn and that there is a chance of failure of his base plate at some point but his best opportunity to improved function and pain control is to undergo that procedure at this time and he is willing to do so  Subjective:   Patient ID: Helen Pierre is a 70 y o  male      Patient returns to review his left shoulder CT scan  We had been contemplating reverse total shoulder arthroplasty but given his severe glenoid deformity on it to get a better CT to understand if I can implant a glenoid  He still has a very limited function and pain with left shoulder            The following portions of the patient's history were reviewed and updated as appropriate: allergies, current medications, past family history, past medical history, past social history, past surgical history and problem list     Review of Systems   Constitutional: Negative for chills and fever  HENT: Negative for hearing loss  Eyes: Negative for visual disturbance  Respiratory: Negative for shortness of breath  Cardiovascular: Negative for chest pain  Gastrointestinal: Negative for abdominal pain  Musculoskeletal:        As reviewed in the HPI   Skin: Negative for rash  Neurological:        As reviewed in the HPI   Psychiatric/Behavioral: Negative for agitation  Objective:  Left Shoulder Exam     Tenderness   None    Range of Motion   Passive Abduction:                    70  Forward Flexion:                        70  External Rotation:                      10    Muscle Strength   Abduction:            4/5  Internal Rotation:  4/5  External Rotation: 4/5    Tests   Impingement:   Positive  Prasad:          Positive  Cross Arm:      Negative  Drop Arm:        Positive  Apprehension: Negative    Comments:  Crepitation and pain with range of motion of the shoulder  Firing deltoid          Physical Exam   Constitutional: He is oriented to person, place, and time  He appears well-developed and well-nourished  HENT:   Head: Normocephalic and atraumatic  Neck: Normal range of motion  Neck supple  Cardiovascular: Normal rate, regular rhythm and normal heart sounds  Pulmonary/Chest: Effort normal and breath sounds normal  No respiratory distress  Abdominal: Soft  He exhibits no distension  Neurological: He is alert and oriented to person, place, and time  Skin: Skin is warm and dry  Psychiatric: He has a normal mood and affect  His behavior is normal    Nursing note and vitals reviewed  I have personally reviewed pertinent films in PACS and my interpretation is as follows      CT scan left shoulder with thinner cuts shows severe glenoid wear but what appears to be an implantable glenoid for a reverse prosthesis

## 2018-10-05 ENCOUNTER — TELEPHONE (OUTPATIENT)
Dept: PREADMISSION TESTING | Facility: HOSPITAL | Age: 68
End: 2018-10-05

## 2018-10-09 ENCOUNTER — TELEPHONE (OUTPATIENT)
Dept: PREADMISSION TESTING | Facility: HOSPITAL | Age: 68
End: 2018-10-09

## 2018-10-10 ENCOUNTER — HOSPITAL ENCOUNTER (OUTPATIENT)
Dept: CT IMAGING | Facility: HOSPITAL | Age: 68
Discharge: HOME | End: 2018-10-10
Attending: ORTHOPAEDIC SURGERY

## 2018-10-10 DIAGNOSIS — M19.90 ARTHRITIS: ICD-10-CM

## 2018-10-19 ENCOUNTER — HOSPITAL ENCOUNTER (OUTPATIENT)
Dept: RADIOLOGY | Facility: HOSPITAL | Age: 68
Discharge: HOME | End: 2018-10-19
Payer: MEDICARE

## 2018-10-19 ENCOUNTER — APPOINTMENT (OUTPATIENT)
Dept: LAB | Facility: HOSPITAL | Age: 68
End: 2018-10-19
Attending: ORTHOPAEDIC SURGERY
Payer: MEDICARE

## 2018-10-19 DIAGNOSIS — Z01.818 ENCOUNTER FOR PREADMISSION TESTING: ICD-10-CM

## 2018-10-19 DIAGNOSIS — M19.012 OSTEOARTHROSIS, LOCALIZED, PRIMARY, SHOULDER REGION, LEFT: ICD-10-CM

## 2018-10-19 LAB
ABO GROUP BLD: NORMAL
ALBUMIN SERPL BCP-MCNC: 3.4 G/DL (ref 3.5–5)
ALP SERPL-CCNC: 132 U/L (ref 46–116)
ALT SERPL W P-5'-P-CCNC: 15 U/L (ref 12–78)
ANION GAP SERPL CALCULATED.3IONS-SCNC: 5 MMOL/L (ref 4–13)
APTT PPP: 30 SECONDS (ref 24–36)
AST SERPL W P-5'-P-CCNC: 16 U/L (ref 5–45)
BASOPHILS # BLD AUTO: 0.03 THOUSANDS/ΜL (ref 0–0.1)
BASOPHILS NFR BLD AUTO: 1 % (ref 0–1)
BILIRUB SERPL-MCNC: 0.4 MG/DL (ref 0.2–1)
BLD GP AB SCN SERPL QL: NEGATIVE
BUN SERPL-MCNC: 15 MG/DL (ref 5–25)
CALCIUM SERPL-MCNC: 8.2 MG/DL (ref 8.3–10.1)
CHLORIDE SERPL-SCNC: 102 MMOL/L (ref 100–108)
CO2 SERPL-SCNC: 31 MMOL/L (ref 21–32)
CREAT SERPL-MCNC: 0.96 MG/DL (ref 0.6–1.3)
EOSINOPHIL # BLD AUTO: 0.11 THOUSAND/ΜL (ref 0–0.61)
EOSINOPHIL NFR BLD AUTO: 2 % (ref 0–6)
ERYTHROCYTE [DISTWIDTH] IN BLOOD BY AUTOMATED COUNT: 13.2 % (ref 11.6–15.1)
EST. AVERAGE GLUCOSE BLD GHB EST-MCNC: 114 MG/DL
GFR SERPL CREATININE-BSD FRML MDRD: 80 ML/MIN/1.73SQ M
GLUCOSE P FAST SERPL-MCNC: 88 MG/DL (ref 65–99)
HBA1C MFR BLD: 5.6 % (ref 4.2–6.3)
HCT VFR BLD AUTO: 36 % (ref 36.5–49.3)
HGB BLD-MCNC: 11.5 G/DL (ref 12–17)
IMM GRANULOCYTES # BLD AUTO: 0.01 THOUSAND/UL (ref 0–0.2)
IMM GRANULOCYTES NFR BLD AUTO: 0 % (ref 0–2)
INR PPP: 1.15 (ref 0.86–1.17)
LYMPHOCYTES # BLD AUTO: 0.88 THOUSANDS/ΜL (ref 0.6–4.47)
LYMPHOCYTES NFR BLD AUTO: 19 % (ref 14–44)
MCH RBC QN AUTO: 33.1 PG (ref 26.8–34.3)
MCHC RBC AUTO-ENTMCNC: 31.9 G/DL (ref 31.4–37.4)
MCV RBC AUTO: 104 FL (ref 82–98)
MONOCYTES # BLD AUTO: 0.44 THOUSAND/ΜL (ref 0.17–1.22)
MONOCYTES NFR BLD AUTO: 10 % (ref 4–12)
NEUTROPHILS # BLD AUTO: 3.09 THOUSANDS/ΜL (ref 1.85–7.62)
NEUTS SEG NFR BLD AUTO: 68 % (ref 43–75)
NRBC BLD AUTO-RTO: 0 /100 WBCS
PLATELET # BLD AUTO: 180 THOUSANDS/UL (ref 149–390)
PMV BLD AUTO: 9.2 FL (ref 8.9–12.7)
POTASSIUM SERPL-SCNC: 3.7 MMOL/L (ref 3.5–5.3)
PROT SERPL-MCNC: 6.6 G/DL (ref 6.4–8.2)
PROTHROMBIN TIME: 14.8 SECONDS (ref 11.8–14.2)
RBC # BLD AUTO: 3.47 MILLION/UL (ref 3.88–5.62)
RH BLD: POSITIVE
SODIUM SERPL-SCNC: 138 MMOL/L (ref 136–145)
SPECIMEN EXPIRATION DATE: NORMAL
WBC # BLD AUTO: 4.56 THOUSAND/UL (ref 4.31–10.16)

## 2018-10-19 PROCEDURE — 86901 BLOOD TYPING SEROLOGIC RH(D): CPT

## 2018-10-19 PROCEDURE — 86900 BLOOD TYPING SEROLOGIC ABO: CPT

## 2018-10-19 PROCEDURE — 80053 COMPREHEN METABOLIC PANEL: CPT

## 2018-10-19 PROCEDURE — 83036 HEMOGLOBIN GLYCOSYLATED A1C: CPT

## 2018-10-19 PROCEDURE — 86850 RBC ANTIBODY SCREEN: CPT

## 2018-10-19 PROCEDURE — 85610 PROTHROMBIN TIME: CPT

## 2018-10-19 PROCEDURE — 71046 X-RAY EXAM CHEST 2 VIEWS: CPT

## 2018-10-19 PROCEDURE — 85025 COMPLETE CBC W/AUTO DIFF WBC: CPT

## 2018-10-19 PROCEDURE — 85730 THROMBOPLASTIN TIME PARTIAL: CPT

## 2018-10-19 PROCEDURE — 36415 COLL VENOUS BLD VENIPUNCTURE: CPT

## 2018-10-19 NOTE — PRE-PROCEDURE INSTRUCTIONS
Pre-Surgery Instructions:   Medication Instructions    acetaminophen (TYLENOL) 325 mg tablet Instructed patient per Anesthesia Guidelines   apixaban (ELIQUIS) 5 mg Instructed patient per Anesthesia Guidelines   aspirin 81 mg chewable tablet Instructed patient per Anesthesia Guidelines   atorvastatin (LIPITOR) 40 mg tablet Instructed patient per Anesthesia Guidelines   budesonide-formoterol (SYMBICORT) 160-4 5 mcg/act inhaler Instructed patient per Anesthesia Guidelines   carBAMazepine (TEGretol) 100 mg chewable tablet Instructed patient per Anesthesia Guidelines   cetirizine (ZyrTEC) 10 mg tablet Instructed patient per Anesthesia Guidelines   docusate sodium (COLACE) 50 mg capsule Instructed patient per Anesthesia Guidelines   DULoxetine (CYMBALTA) 60 mg delayed release capsule Instructed patient per Anesthesia Guidelines   ELIQUIS 2 5 MG Instructed patient per Anesthesia Guidelines   gabapentin (NEURONTIN) 300 mg capsule Instructed patient per Anesthesia Guidelines   gabapentin (NEURONTIN) 600 MG tablet Instructed patient per Anesthesia Guidelines   guaiFENesin (ROBITUSSIN) 100 MG/5ML oral liquid Instructed patient per Anesthesia Guidelines   ibuprofen (MOTRIN) 400 mg tablet Instructed patient per Anesthesia Guidelines   isosorbide mononitrate (IMDUR) 30 mg 24 hr tablet Instructed patient per Anesthesia Guidelines   magnesium hydroxide (CVS MILK OF MAGNESIA) 400 mg/5 mL oral suspension Instructed patient per Anesthesia Guidelines   MELATONIN PO Instructed patient per Anesthesia Guidelines   Menthol (BENGAY ULTRA STRENGTH) 5 % PTCH Instructed patient per Anesthesia Guidelines   menthol-cetylpyridinium (CEPACOL) 3 MG lozenge Instructed patient per Anesthesia Guidelines   nitroglycerin (NITROSTAT) 0 4 mg SL tablet Instructed patient per Anesthesia Guidelines   polyethylene glycol (MIRALAX) powder Instructed patient per Anesthesia Guidelines      polyethylene glycol-propylene glycol (SYSTANE) 0 4-0 3 % Instructed patient per Anesthesia Guidelines   QUEtiapine (SEROquel) 25 mg tablet Instructed patient per Anesthesia Guidelines   QUEtiapine (SEROquel) 50 mg tablet Instructed patient per Anesthesia Guidelines   SPIRIVA HANDIHALER 18 MCG inhalation capsule Instructed patient per Anesthesia Guidelines   spironolactone (ALDACTONE) 25 mg tablet Instructed patient per Anesthesia Guidelines   torsemide (DEMADEX) 20 mg tablet Instructed patient per Anesthesia Guidelines   traZODone (DESYREL) 50 mg tablet Instructed patient per Anesthesia Guidelines  REVIEWED  PRINTED SURGICAL INSTRUCTIONS WITH PATIENT , PATIENT VERBALIZED UNDERSTANDING  MEDICATIONS REVIEWED  PER DOCTORS ORDERS WILL STOP ELIQUIS 3 DAYS BEFORE AND ASPIRIN ONE WEEK BEFORE  Alpha adrenergic antagonist Med Class     Continue to take this medication on your normal schedule  If this is an oral medication and you take it in the morning, then you may take this medicine with a sip of water  Antidepressant Med Class     Continue to take this medication on your normal schedule  If this is an oral medication and you take it in the morning, then you may take this medicine with a sip of water  Antiepileptic Med Class     Continue to take this medication on your normal schedule  If this is an oral medication and you take it in the morning, then you may take this medicine with a sip of water  Antipsychotic Med Class     Continue to take this medication on your normal schedule  If this is an oral medication and you take it in the morning, then you may take this medicine with a sip of water  Direct Xa Inhibitor Med Class     Stop taking this medication at least 3 days prior to surgery/procedure with prescribing Physician and Surgeon consultation  Herbal Med Class     Stop taking this herbal medications at least one week prior to surgery/procedure    Inhalational Med Class     Continue to take these inhaler medications on your normal schedule up to and including the day of surgery  Nitroglycerin Med Class     Continue to take your nitroglyerin as directed by your prescribing Physician and notify your Physician and Anesthesiologist if you have needed to increase the frequency or dosage  NSAID Med Class     Stop taking this medication at least 3 days prior to surgery/procedure  Statin Med Class     Continue to take this medication on your normal schedule  If this is an oral medication and you take it in the morning, then you may take this medicine with a sip of water

## 2018-11-06 ENCOUNTER — HOSPITAL ENCOUNTER (INPATIENT)
Facility: HOSPITAL | Age: 68
LOS: 1 days | DRG: 483 | End: 2018-11-07
Attending: ORTHOPAEDIC SURGERY | Admitting: ORTHOPAEDIC SURGERY
Payer: MEDICARE

## 2018-11-06 ENCOUNTER — ANESTHESIA (OUTPATIENT)
Dept: PERIOP | Facility: HOSPITAL | Age: 68
DRG: 483 | End: 2018-11-06
Payer: MEDICARE

## 2018-11-06 ENCOUNTER — APPOINTMENT (INPATIENT)
Dept: RADIOLOGY | Facility: HOSPITAL | Age: 68
DRG: 483 | End: 2018-11-06
Payer: MEDICARE

## 2018-11-06 ENCOUNTER — ANESTHESIA EVENT (OUTPATIENT)
Dept: PERIOP | Facility: HOSPITAL | Age: 68
DRG: 483 | End: 2018-11-06
Payer: MEDICARE

## 2018-11-06 DIAGNOSIS — I10 HYPERTENSION, ESSENTIAL, BENIGN: ICD-10-CM

## 2018-11-06 DIAGNOSIS — M19.012 PRIMARY OSTEOARTHRITIS OF LEFT SHOULDER: Primary | ICD-10-CM

## 2018-11-06 DIAGNOSIS — I48.0 PAROXYSMAL ATRIAL FIBRILLATION (HCC): ICD-10-CM

## 2018-11-06 DIAGNOSIS — J41.0 SIMPLE CHRONIC BRONCHITIS (HCC): ICD-10-CM

## 2018-11-06 DIAGNOSIS — I50.42 CHRONIC COMBINED SYSTOLIC AND DIASTOLIC CHF (CONGESTIVE HEART FAILURE) (HCC): ICD-10-CM

## 2018-11-06 LAB
ABO GROUP BLD: NORMAL
BLD GP AB SCN SERPL QL: NEGATIVE
RH BLD: POSITIVE
SPECIMEN EXPIRATION DATE: NORMAL

## 2018-11-06 PROCEDURE — 0LS40ZZ REPOSITION LEFT UPPER ARM TENDON, OPEN APPROACH: ICD-10-PCS | Performed by: ORTHOPAEDIC SURGERY

## 2018-11-06 PROCEDURE — C1776 JOINT DEVICE (IMPLANTABLE): HCPCS | Performed by: ORTHOPAEDIC SURGERY

## 2018-11-06 PROCEDURE — 86900 BLOOD TYPING SEROLOGIC ABO: CPT | Performed by: ORTHOPAEDIC SURGERY

## 2018-11-06 PROCEDURE — C9290 INJ, BUPIVACAINE LIPOSOME: HCPCS | Performed by: ORTHOPAEDIC SURGERY

## 2018-11-06 PROCEDURE — 0RRK00Z REPLACEMENT OF LEFT SHOULDER JOINT WITH REVERSE BALL AND SOCKET SYNTHETIC SUBSTITUTE, OPEN APPROACH: ICD-10-PCS | Performed by: ORTHOPAEDIC SURGERY

## 2018-11-06 PROCEDURE — 23472 RECONSTRUCT SHOULDER JOINT: CPT | Performed by: ORTHOPAEDIC SURGERY

## 2018-11-06 PROCEDURE — C1713 ANCHOR/SCREW BN/BN,TIS/BN: HCPCS | Performed by: ORTHOPAEDIC SURGERY

## 2018-11-06 PROCEDURE — 23472 RECONSTRUCT SHOULDER JOINT: CPT | Performed by: PHYSICIAN ASSISTANT

## 2018-11-06 PROCEDURE — 73020 X-RAY EXAM OF SHOULDER: CPT

## 2018-11-06 PROCEDURE — 86901 BLOOD TYPING SEROLOGIC RH(D): CPT | Performed by: ORTHOPAEDIC SURGERY

## 2018-11-06 PROCEDURE — 86850 RBC ANTIBODY SCREEN: CPT | Performed by: ORTHOPAEDIC SURGERY

## 2018-11-06 DEVICE — GLENOSPHERE STD 42MM: Type: IMPLANTABLE DEVICE | Site: SHOULDER | Status: FUNCTIONAL

## 2018-11-06 DEVICE — IMPLANTABLE DEVICE: Type: IMPLANTABLE DEVICE | Site: SHOULDER | Status: FUNCTIONAL

## 2018-11-06 DEVICE — SCREW PERIPHERAL 5 X 30MM: Type: IMPLANTABLE DEVICE | Site: SHOULDER | Status: FUNCTIONAL

## 2018-11-06 DEVICE — IMPLANTABLE DEVICE
Type: IMPLANTABLE DEVICE | Site: SHOULDER | Status: FUNCTIONAL
Brand: AEQUALIS™ ASCEND™ FLEX

## 2018-11-06 DEVICE — IMPLANTABLE DEVICE
Type: IMPLANTABLE DEVICE | Site: SHOULDER | Status: FUNCTIONAL
Brand: FLEX SHOULDER SYSTEM

## 2018-11-06 DEVICE — SCREW PERIPHERAL 5 X 26MM: Type: IMPLANTABLE DEVICE | Site: SHOULDER | Status: FUNCTIONAL

## 2018-11-06 DEVICE — SCREW PERIPHERAL 5 X 22MM: Type: IMPLANTABLE DEVICE | Site: SHOULDER | Status: FUNCTIONAL

## 2018-11-06 DEVICE — AUGMENT BASEPLATE 15DEG 29MM FULL WEDGE: Type: IMPLANTABLE DEVICE | Site: SHOULDER | Status: FUNCTIONAL

## 2018-11-06 DEVICE — SCREW PERIPHERAL 5 X 14MM: Type: IMPLANTABLE DEVICE | Site: SHOULDER | Status: FUNCTIONAL

## 2018-11-06 RX ORDER — DOCUSATE SODIUM 100 MG/1
100 CAPSULE, LIQUID FILLED ORAL 2 TIMES DAILY
Status: DISCONTINUED | OUTPATIENT
Start: 2018-11-06 | End: 2018-11-07 | Stop reason: HOSPADM

## 2018-11-06 RX ORDER — ASPIRIN 81 MG/1
81 TABLET, CHEWABLE ORAL DAILY
Status: DISCONTINUED | OUTPATIENT
Start: 2018-11-06 | End: 2018-11-07 | Stop reason: HOSPADM

## 2018-11-06 RX ORDER — DOCUSATE SODIUM 100 MG/1
100 CAPSULE, LIQUID FILLED ORAL 2 TIMES DAILY
Status: DISCONTINUED | OUTPATIENT
Start: 2018-11-06 | End: 2018-11-06 | Stop reason: SDUPTHER

## 2018-11-06 RX ORDER — QUETIAPINE FUMARATE 25 MG/1
25 TABLET, FILM COATED ORAL
Status: DISCONTINUED | OUTPATIENT
Start: 2018-11-06 | End: 2018-11-07 | Stop reason: HOSPADM

## 2018-11-06 RX ORDER — SODIUM CHLORIDE, SODIUM LACTATE, POTASSIUM CHLORIDE, CALCIUM CHLORIDE 600; 310; 30; 20 MG/100ML; MG/100ML; MG/100ML; MG/100ML
125 INJECTION, SOLUTION INTRAVENOUS CONTINUOUS
Status: DISCONTINUED | OUTPATIENT
Start: 2018-11-06 | End: 2018-11-06

## 2018-11-06 RX ORDER — LANOLIN ALCOHOL/MO/W.PET/CERES
3 CREAM (GRAM) TOPICAL
Status: DISCONTINUED | OUTPATIENT
Start: 2018-11-06 | End: 2018-11-07 | Stop reason: HOSPADM

## 2018-11-06 RX ORDER — NITROGLYCERIN 0.4 MG/1
0.4 TABLET SUBLINGUAL
Status: DISCONTINUED | OUTPATIENT
Start: 2018-11-06 | End: 2018-11-07 | Stop reason: HOSPADM

## 2018-11-06 RX ORDER — TRAZODONE HYDROCHLORIDE 50 MG/1
50 TABLET ORAL
Status: DISCONTINUED | OUTPATIENT
Start: 2018-11-06 | End: 2018-11-07 | Stop reason: HOSPADM

## 2018-11-06 RX ORDER — GABAPENTIN 300 MG/1
300 CAPSULE ORAL 2 TIMES DAILY
Status: DISCONTINUED | OUTPATIENT
Start: 2018-11-06 | End: 2018-11-07 | Stop reason: HOSPADM

## 2018-11-06 RX ORDER — TORSEMIDE 20 MG/1
40 TABLET ORAL DAILY
Status: DISCONTINUED | OUTPATIENT
Start: 2018-11-06 | End: 2018-11-06

## 2018-11-06 RX ORDER — MIDAZOLAM HYDROCHLORIDE 1 MG/ML
INJECTION INTRAMUSCULAR; INTRAVENOUS AS NEEDED
Status: DISCONTINUED | OUTPATIENT
Start: 2018-11-06 | End: 2018-11-06 | Stop reason: SURG

## 2018-11-06 RX ORDER — OXYCODONE HYDROCHLORIDE 5 MG/1
10 TABLET ORAL EVERY 4 HOURS PRN
Status: DISCONTINUED | OUTPATIENT
Start: 2018-11-06 | End: 2018-11-07 | Stop reason: HOSPADM

## 2018-11-06 RX ORDER — CARBAMAZEPINE 100 MG/1
100 TABLET, CHEWABLE ORAL 2 TIMES DAILY
Status: DISCONTINUED | OUTPATIENT
Start: 2018-11-06 | End: 2018-11-07 | Stop reason: HOSPADM

## 2018-11-06 RX ORDER — OXYCODONE HYDROCHLORIDE 5 MG/1
TABLET ORAL
Qty: 30 TABLET | Refills: 0 | Status: ON HOLD | OUTPATIENT
Start: 2018-11-06 | End: 2018-11-13

## 2018-11-06 RX ORDER — IBUPROFEN 400 MG/1
400 TABLET ORAL EVERY 6 HOURS PRN
Status: DISCONTINUED | OUTPATIENT
Start: 2018-11-06 | End: 2018-11-07 | Stop reason: HOSPADM

## 2018-11-06 RX ORDER — FENTANYL CITRATE 50 UG/ML
INJECTION, SOLUTION INTRAMUSCULAR; INTRAVENOUS
Status: DISCONTINUED
Start: 2018-11-06 | End: 2018-11-06 | Stop reason: WASHOUT

## 2018-11-06 RX ORDER — ONDANSETRON 2 MG/ML
INJECTION INTRAMUSCULAR; INTRAVENOUS AS NEEDED
Status: DISCONTINUED | OUTPATIENT
Start: 2018-11-06 | End: 2018-11-06 | Stop reason: SURG

## 2018-11-06 RX ORDER — BUDESONIDE AND FORMOTEROL FUMARATE DIHYDRATE 160; 4.5 UG/1; UG/1
2 AEROSOL RESPIRATORY (INHALATION) 2 TIMES DAILY
Status: DISCONTINUED | OUTPATIENT
Start: 2018-11-06 | End: 2018-11-07 | Stop reason: HOSPADM

## 2018-11-06 RX ORDER — BUPIVACAINE HYDROCHLORIDE 2.5 MG/ML
INJECTION, SOLUTION INFILTRATION; PERINEURAL AS NEEDED
Status: DISCONTINUED | OUTPATIENT
Start: 2018-11-06 | End: 2018-11-06 | Stop reason: SURG

## 2018-11-06 RX ORDER — ONDANSETRON 2 MG/ML
4 INJECTION INTRAMUSCULAR; INTRAVENOUS ONCE AS NEEDED
Status: DISCONTINUED | OUTPATIENT
Start: 2018-11-06 | End: 2018-11-06 | Stop reason: HOSPADM

## 2018-11-06 RX ORDER — ISOSORBIDE MONONITRATE 30 MG/1
30 TABLET, EXTENDED RELEASE ORAL DAILY
Status: DISCONTINUED | OUTPATIENT
Start: 2018-11-07 | End: 2018-11-07 | Stop reason: HOSPADM

## 2018-11-06 RX ORDER — DULOXETIN HYDROCHLORIDE 60 MG/1
60 CAPSULE, DELAYED RELEASE ORAL DAILY
Status: DISCONTINUED | OUTPATIENT
Start: 2018-11-06 | End: 2018-11-07 | Stop reason: HOSPADM

## 2018-11-06 RX ORDER — HYDROMORPHONE HCL/PF 1 MG/ML
0.5 SYRINGE (ML) INJECTION
Status: COMPLETED | OUTPATIENT
Start: 2018-11-06 | End: 2018-11-06

## 2018-11-06 RX ORDER — ONDANSETRON 2 MG/ML
4 INJECTION INTRAMUSCULAR; INTRAVENOUS EVERY 6 HOURS PRN
Status: DISCONTINUED | OUTPATIENT
Start: 2018-11-06 | End: 2018-11-07 | Stop reason: HOSPADM

## 2018-11-06 RX ORDER — MINERAL OIL AND PETROLATUM 150; 830 MG/G; MG/G
OINTMENT OPHTHALMIC 3 TIMES DAILY PRN
Status: DISCONTINUED | OUTPATIENT
Start: 2018-11-06 | End: 2018-11-07 | Stop reason: HOSPADM

## 2018-11-06 RX ORDER — GLYCOPYRROLATE 0.2 MG/ML
INJECTION INTRAMUSCULAR; INTRAVENOUS AS NEEDED
Status: DISCONTINUED | OUTPATIENT
Start: 2018-11-06 | End: 2018-11-06 | Stop reason: SURG

## 2018-11-06 RX ORDER — SODIUM CHLORIDE, SODIUM LACTATE, POTASSIUM CHLORIDE, CALCIUM CHLORIDE 600; 310; 30; 20 MG/100ML; MG/100ML; MG/100ML; MG/100ML
100 INJECTION, SOLUTION INTRAVENOUS CONTINUOUS
Status: DISPENSED | OUTPATIENT
Start: 2018-11-06 | End: 2018-11-07

## 2018-11-06 RX ORDER — ROCURONIUM BROMIDE 10 MG/ML
INJECTION, SOLUTION INTRAVENOUS AS NEEDED
Status: DISCONTINUED | OUTPATIENT
Start: 2018-11-06 | End: 2018-11-06 | Stop reason: SURG

## 2018-11-06 RX ORDER — MIDAZOLAM HYDROCHLORIDE 1 MG/ML
INJECTION INTRAMUSCULAR; INTRAVENOUS
Status: COMPLETED
Start: 2018-11-06 | End: 2018-11-06

## 2018-11-06 RX ORDER — GABAPENTIN 300 MG/1
600 CAPSULE ORAL
Status: DISCONTINUED | OUTPATIENT
Start: 2018-11-06 | End: 2018-11-07 | Stop reason: HOSPADM

## 2018-11-06 RX ORDER — ACETAMINOPHEN 325 MG/1
650 TABLET ORAL EVERY 6 HOURS PRN
Status: DISCONTINUED | OUTPATIENT
Start: 2018-11-06 | End: 2018-11-07 | Stop reason: HOSPADM

## 2018-11-06 RX ORDER — LORATADINE 10 MG/1
10 TABLET ORAL DAILY
Status: DISCONTINUED | OUTPATIENT
Start: 2018-11-06 | End: 2018-11-07 | Stop reason: HOSPADM

## 2018-11-06 RX ORDER — PROPOFOL 10 MG/ML
INJECTION, EMULSION INTRAVENOUS AS NEEDED
Status: DISCONTINUED | OUTPATIENT
Start: 2018-11-06 | End: 2018-11-06 | Stop reason: SURG

## 2018-11-06 RX ORDER — MAGNESIUM HYDROXIDE 1200 MG/15ML
LIQUID ORAL AS NEEDED
Status: DISCONTINUED | OUTPATIENT
Start: 2018-11-06 | End: 2018-11-06 | Stop reason: HOSPADM

## 2018-11-06 RX ORDER — OXYCODONE HYDROCHLORIDE 5 MG/1
5 TABLET ORAL EVERY 4 HOURS PRN
Status: DISCONTINUED | OUTPATIENT
Start: 2018-11-06 | End: 2018-11-07 | Stop reason: HOSPADM

## 2018-11-06 RX ORDER — POLYETHYLENE GLYCOL 3350 17 G/17G
17 POWDER, FOR SOLUTION ORAL DAILY PRN
Status: DISCONTINUED | OUTPATIENT
Start: 2018-11-06 | End: 2018-11-07 | Stop reason: HOSPADM

## 2018-11-06 RX ORDER — ATORVASTATIN CALCIUM 40 MG/1
40 TABLET, FILM COATED ORAL DAILY
Status: DISCONTINUED | OUTPATIENT
Start: 2018-11-06 | End: 2018-11-07 | Stop reason: HOSPADM

## 2018-11-06 RX ORDER — SPIRONOLACTONE 25 MG/1
25 TABLET ORAL DAILY
Status: DISCONTINUED | OUTPATIENT
Start: 2018-11-06 | End: 2018-11-06

## 2018-11-06 RX ADMIN — TORSEMIDE 40 MG: 20 TABLET ORAL at 13:12

## 2018-11-06 RX ADMIN — ATORVASTATIN CALCIUM 40 MG: 40 TABLET, FILM COATED ORAL at 13:13

## 2018-11-06 RX ADMIN — OXYCODONE HYDROCHLORIDE 5 MG: 5 TABLET ORAL at 21:19

## 2018-11-06 RX ADMIN — PHENYLEPHRINE HYDROCHLORIDE 50 MCG/MIN: 10 INJECTION INTRAVENOUS at 09:09

## 2018-11-06 RX ADMIN — DOCUSATE SODIUM 100 MG: 100 CAPSULE, LIQUID FILLED ORAL at 13:12

## 2018-11-06 RX ADMIN — DOCUSATE SODIUM 100 MG: 100 CAPSULE, LIQUID FILLED ORAL at 17:05

## 2018-11-06 RX ADMIN — CEFAZOLIN SODIUM 2000 MG: 2 SOLUTION INTRAVENOUS at 09:16

## 2018-11-06 RX ADMIN — GABAPENTIN 300 MG: 300 CAPSULE ORAL at 17:04

## 2018-11-06 RX ADMIN — OXYCODONE HYDROCHLORIDE 5 MG: 5 TABLET ORAL at 17:04

## 2018-11-06 RX ADMIN — PROPOFOL 150 MG: 10 INJECTION, EMULSION INTRAVENOUS at 09:14

## 2018-11-06 RX ADMIN — OXYCODONE HYDROCHLORIDE 10 MG: 10 TABLET ORAL at 13:13

## 2018-11-06 RX ADMIN — ROCURONIUM BROMIDE 20 MG: 10 INJECTION INTRAVENOUS at 09:37

## 2018-11-06 RX ADMIN — GLYCOPYRROLATE 0.4 MG: 0.2 INJECTION, SOLUTION INTRAMUSCULAR; INTRAVENOUS at 11:13

## 2018-11-06 RX ADMIN — SODIUM CHLORIDE, SODIUM LACTATE, POTASSIUM CHLORIDE, AND CALCIUM CHLORIDE 125 ML/HR: .6; .31; .03; .02 INJECTION, SOLUTION INTRAVENOUS at 11:35

## 2018-11-06 RX ADMIN — MIDAZOLAM 2 MG: 1 INJECTION INTRAMUSCULAR; INTRAVENOUS at 08:59

## 2018-11-06 RX ADMIN — SODIUM CHLORIDE, SODIUM LACTATE, POTASSIUM CHLORIDE, AND CALCIUM CHLORIDE: .6; .31; .03; .02 INJECTION, SOLUTION INTRAVENOUS at 11:21

## 2018-11-06 RX ADMIN — Medication 81 MG: at 13:12

## 2018-11-06 RX ADMIN — TRAZODONE HYDROCHLORIDE 50 MG: 50 TABLET ORAL at 21:19

## 2018-11-06 RX ADMIN — CARBAMAZEPINE 100 MG: 100 TABLET, CHEWABLE ORAL at 17:04

## 2018-11-06 RX ADMIN — HYDROMORPHONE HYDROCHLORIDE 0.5 MG: 1 INJECTION, SOLUTION INTRAMUSCULAR; INTRAVENOUS; SUBCUTANEOUS at 11:32

## 2018-11-06 RX ADMIN — LORATADINE 10 MG: 10 TABLET ORAL at 13:12

## 2018-11-06 RX ADMIN — CEFAZOLIN SODIUM 2000 MG: 2 SOLUTION INTRAVENOUS at 17:06

## 2018-11-06 RX ADMIN — ONDANSETRON 4 MG: 2 INJECTION INTRAMUSCULAR; INTRAVENOUS at 11:05

## 2018-11-06 RX ADMIN — BUDESONIDE AND FORMOTEROL FUMARATE DIHYDRATE 2 PUFF: 160; 4.5 AEROSOL RESPIRATORY (INHALATION) at 17:05

## 2018-11-06 RX ADMIN — SODIUM CHLORIDE, SODIUM LACTATE, POTASSIUM CHLORIDE, AND CALCIUM CHLORIDE: .6; .31; .03; .02 INJECTION, SOLUTION INTRAVENOUS at 08:58

## 2018-11-06 RX ADMIN — QUETIAPINE FUMARATE 25 MG: 25 TABLET ORAL at 21:22

## 2018-11-06 RX ADMIN — DULOXETINE HYDROCHLORIDE 60 MG: 60 CAPSULE, DELAYED RELEASE ORAL at 13:12

## 2018-11-06 RX ADMIN — NEOSTIGMINE METHYLSULFATE 3 MG: 1 INJECTION, SOLUTION INTRAMUSCULAR; INTRAVENOUS; SUBCUTANEOUS at 11:13

## 2018-11-06 RX ADMIN — BUPIVACAINE 10 ML: 13.3 INJECTION, SUSPENSION, LIPOSOMAL INFILTRATION at 09:05

## 2018-11-06 RX ADMIN — MELATONIN 3 MG: at 21:22

## 2018-11-06 RX ADMIN — SODIUM CHLORIDE, SODIUM LACTATE, POTASSIUM CHLORIDE, AND CALCIUM CHLORIDE 100 ML/HR: .6; .31; .03; .02 INJECTION, SOLUTION INTRAVENOUS at 21:26

## 2018-11-06 RX ADMIN — GABAPENTIN 600 MG: 300 CAPSULE ORAL at 21:19

## 2018-11-06 RX ADMIN — HYDROMORPHONE HYDROCHLORIDE 0.5 MG: 1 INJECTION, SOLUTION INTRAMUSCULAR; INTRAVENOUS; SUBCUTANEOUS at 11:51

## 2018-11-06 RX ADMIN — CARBAMAZEPINE 100 MG: 100 TABLET, CHEWABLE ORAL at 13:16

## 2018-11-06 RX ADMIN — HYDROMORPHONE HYDROCHLORIDE 0.5 MG: 1 INJECTION, SOLUTION INTRAMUSCULAR; INTRAVENOUS; SUBCUTANEOUS at 11:45

## 2018-11-06 RX ADMIN — GABAPENTIN 300 MG: 300 CAPSULE ORAL at 13:16

## 2018-11-06 RX ADMIN — HYDROMORPHONE HYDROCHLORIDE 0.5 MG: 1 INJECTION, SOLUTION INTRAMUSCULAR; INTRAVENOUS; SUBCUTANEOUS at 11:37

## 2018-11-06 RX ADMIN — ROCURONIUM BROMIDE 30 MG: 10 INJECTION INTRAVENOUS at 09:14

## 2018-11-06 RX ADMIN — BUPIVACAINE HYDROCHLORIDE 10 ML: 2.5 INJECTION, SOLUTION INFILTRATION; PERINEURAL at 09:05

## 2018-11-06 RX ADMIN — DEXAMETHASONE SODIUM PHOSPHATE 10 MG: 10 INJECTION INTRAMUSCULAR; INTRAVENOUS at 11:05

## 2018-11-06 RX ADMIN — ROCURONIUM BROMIDE 20 MG: 10 INJECTION INTRAVENOUS at 10:39
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
## 2018-11-06 NOTE — OP NOTE
OPERATIVE REPORT  PATIENT NAME: Huntley Sever    :  1950  MRN: [id number]  Pt Location: [facility] OR ROOM 15    SURGERY DATE: 2018     SURGEON: Mariposa Heck MD     ASSISTANT: Meena Ponce PA-C     NOTE: Meena Ponce PA-C was present throughout the entire procedure and performed essential assistance with patient prepping, draping, positioning, suture management, wound closure, sterile dressing application and sling application, all under my direct supervision  NOTE: No qualified resident physician was available for assistance    PREOPERATIVE DIAGNOSIS: Left Shoulder Glenohumeral Osteoarthritis With Severe Glenoid Bone Loss    POSTOPERATIVE DIAGNOSIS:  Same    PROCEDURES: Left Shoulder with Reverse Total Shoulder Arthroplasty with Long Head Biceps Tenodesis    ANESTHESIA STAFF: Tino Weiss MD     ANESTHESIA TYPE: General with endotracheal tube with interscalene block (Exparel)    COMPLICATIONS: None    FINDINGS:  Advanced glenohumeral osteoarthritis with severe glenoid bone loss    SPECIMEN(S): none    ESTIMATED BLOOD LOSS: Minimal    INDICATIONS FOR PROCEDURE:  The patient is a 70 y o  male presenting with pain and lack of function secondary to advanced glenohumeral osteoarthritis with severe posterior and superior bone loss of the left shoulder  Preoperative measurements revealed almost 26° of superior inclination and severe medialization of the center of rotation  After a thorough discussion of the risks and benefits of operative and nonoperative care the patient elected for left reverse total shoulder arthroplasty as the only reasonable option to restore his center of rotation and deal with the severe bone loss of the glenoid  Informed consent was obtained in the office  OPERATIVE TECHNIQUE:  The day of surgery I identified the patient's left shoulder and marked it with my initials    The patient was taken back to the operating room where endotracheal tube with interscalene block (Exparel)  was placed by the anesthesia staff without complication  The patient was placed in the beachchair position with all bony prominences padded  The left shoulder was prepped and draped in routine sterile fashion and after a time-out for safety and confirming 2 grams of IV Cefazolin were given, a standard deltopectoral approach was performed  The dissection was carried down to the subscapularis which was intact  It was released and tagged for repair to the anterior humerus later in the case    The long head of biceps had severe tenosynovitis and degeneration, so it was released and tagged for later tenodesis to the anterior humerus at the end of the case  The humeral head was exposed and found to have severe degenerative change with an anterior suprapsinatus rupture with an intact infraspinatus  The humerus was prepared keeping with the surgical technique for a Tornier Flex reverse prosthesis  After preparing the humerus the glenoid was exposed and the in keeping with the preoperative plan had severe medialization and superior bone loss  As the preoperative template showed the base plate had to be placed as inferior as possible in order to achieve fixation in the best bone and still be able to correct the deformity for implantation of a glenoid  The glenoid prepared for a 29 mm Tornier Perform Plus full wedge baseplate with the wedge placed superiorly at around the 1 o'clock position on the left glenoid  This was able to correct superior inclination closer to neutral as well as maintain almost neutral version  The baseplate was placed inferiorly and a central post was utilized as there was not enough medial bone for central screw fixation    Additional fixation was achieved with a superior 26 mm non-locking screw followed by an anterior 26 mm, a posterior 30 mm and an inferior 14 mm locking screws (this most inferior screw went through the inferior part the glenoid and was palpable medially but did achieve good fixation for rotational control, this was secondary to the requirement of placing the base plate as inferior as possible)  These achieved excellent fixation of the baseplate to the glenoid  A 42 mm glenosphere was then impacted  The humerus was finished and a size 1B Torneir Flex Stem with a low offset tray and a +6 mm polyethylene was trialed and found to have excellent stability with full range of motion and appropriate soft tissue tension  Final implants were placed and the area was irrigated with pulse lavage  The subscapularis was repaired to the anterior humerus and the long head biceps was tenodesed to the anterior humerus with Orthocord sutures  The deltopectoral interval was loosely closed with 0 Vicryl and the subdermal layer with 2-0 Vicryl with staples for skin  Sterile dressings and a sling with abduction pillow was placed and the patient was awoken  The patient was transported to the recovery room in good condition and will be admitted for post-operative care and physical therapy will be initiated following the standard reverse total shoulder arthroplasty protocol      PATIENT DISPOSITION:  Stable to PACU      SIGNATURE: Liam Mcduffie MD  DATE: November 6, 2018  TIME: 11:24 AM

## 2018-11-06 NOTE — ANESTHESIA POSTPROCEDURE EVALUATION
Post-Op Assessment Note      CV Status:  Stable    Mental Status:  Alert and awake    Hydration Status:  Euvolemic    PONV Controlled:  Controlled    Airway Patency:  Patent    Post Op Vitals Reviewed: Yes          Staff: CRNA           /71 (11/06/18 1127)    Temp 97 5 °F (36 4 °C) (11/06/18 1127)    Pulse 78 (11/06/18 1127)   Resp 20 (11/06/18 1127)    SpO2 93 % (11/06/18 1127)

## 2018-11-06 NOTE — DISCHARGE INSTRUCTIONS
What to Expect Before and After Shoulder Replacement Surgery  You are being scheduled for a shoulder replacement by Dr Eula Ny to treat your shoulder condition  Here is some information which may help to answer questions that you may have  Please do not hesitate to reach out to our team to answer questions not addressed here  Before Surgery  You will be contacted the evening prior to your surgery to confirm the scheduled time of the procedure and when to arrive at the hospital    Do not eat or drink anything after midnight the night before your surgery so that the anesthesia can be performed safely  If you have been fitted for a sling in the office prior to the surgery please remember to bring it to the hospital   You will meet the anesthesiologist the morning of the surgery  The surgery is performed under a general anesthetic but they will also offer you a regional block (shot to numb the arm) to help control your post-operative pain as well as with a catheter that is left in place after the block  The catheter is connected to a small pump which will continue to provide numbing medicine and help prolong the pain control from the block  Unfortunately this catheter is not as effective as the initial block, but can still be very helpful in managing the pain  After Surgery and in the Hospital  The shoulder replacement surgery typically takes 60-90 minutes  When surgery is completed, your surgeon will update your family and friends on your condition and progress  You will remain in the recovery room for at least an hour or until the anesthesia has worn off and your blood pressure and pulse are stable  If you have pain, the nurses will give you medication  Once out of surgery, your surgeon will decide on how long you will be using a sling in order to protect and position your shoulder  However, this won't keep you from starting physical therapy    Exercises typically begin on the day after surgery with emphasis on moving the shoulder, wrist, and hand  The physical therapist will be provided with a detailed protocol but typically the first 6 weeks are used to regain range of motion and then strengthening is initiated  Starting strengthening exercises too early may lead to complications  When You Are Discharged from the [address] [place] [address] can expect to be released from the hospital the day after surgery (this may change if you have special needs or medical conditions)  Before you are released, the treatment team (Orthopaedic surgery residents, physician assistants and physical therapists) will talk with you about the importance of limiting any sudden or stressful movements to the arm for several weeks or longer  Activities that involve pushing, pulling, and lifting should not be done until you are given permission from your surgeon  Your First Day at [address] [place] [address] may need help with your daily activities, so it is a good idea to have family and friends prepared to help you  It is okay to remove the sling and let the arm hang at the side so that you can get cleaned and change your clothes  To put on a shirt, place the bad arm in first and then the good arm  Reverse to take it off  Don't forget to wear the sling every night for at least the first month after surgery, and never use your arm to push yourself up in bed or from a chair  The added weight on your shoulder may cause you to re-injure the joint  How to [place] in the First Week  You are encouraged to return to your normal eating and sleeping patterns as soon as possible  It is important for you to be active in order to control your weight and muscle tone  It is ok to increase your activity level and even perform light aerobic exercise (like walking or riding a stationary bike) within the first week or so if you are feeling up to it    If there is concern about these activates best to wait until the first post-operative visit and discuss this with the ulysses Douglas might be able to return to work within several days if you can perform your job while wearing a sling  Consult with your doctor, as this differs from patient to patient  However, if your job requires heavy lifting or climbing, there may be a delay for several months  Until you are seen for your first follow-up visit, please try and keep wound dry  It is okay to shower but try your best to keep the incision out of direct contact with the water (or consider using a waterproof bandage)  If it does gets wet please dry as best as possible afterwards  If you notice any drainage or a foul odor from your incision or your temperature goes above 101 5 degrees, please contact the office  What You Can Expect in the First Month  Your first post-operative appointment will be with Dr Surjit Beck physician assistant (PA) around 2 weeks after the surgery  You skin staples will be removed and X-rays will be obtained at that visit  Please understand that it is quite common to still experience pain at that time but the pain should be steadily improving  Hopefully physical therapy has already begun but if not it will be initiated at this visit and will continue for the 8-12 weeks  At about 12 weeks after surgery you will start a progressive strengthening program  Physical therapy is a deliberate process of not only strengthening your shoulder but also altering how you use your arm  It may be many months before your desired results are achieved, so do not get discouraged  Your shoulder will generally continue to improve steadily up to 6-8 months after surgery  After that point further improvement is very slow; although it has been shown that even after a year or more, activity can increase as muscle strength continues to improve  After the First Month at Home   Because each person heals differently, there are different recovery timelines  An average recovery period typically lasts about between 3-6 months  Talk with your surgeon about which activities will be appropriate for you once you have recovered

## 2018-11-06 NOTE — ANESTHESIA PREPROCEDURE EVALUATION
Review of Systems/Medical History  Patient summary reviewed  Chart reviewed  No history of anesthetic complications     Cardiovascular  EKG reviewed, Exercise tolerance (METS): >4,  Hyperlipidemia, Hypertension , Past MI , CAD , Cardiac stents  CHF ,    Pulmonary  Smoker ex-smoker  , COPD , Sleep apnea ,        GI/Hepatic  Negative GI/hepatic ROS          Negative  ROS        Endo/Other  Negative endo/other ROS      GYN       Hematology  Negative hematology ROS      Musculoskeletal    Arthritis     Neurology  Negative neurology ROS      Psychology   Anxiety, Depression ,              Physical Exam    Airway    Mallampati score: II  TM Distance: >3 FB  Neck ROM: full     Dental   No notable dental hx     Cardiovascular  Rhythm: irregular, Rate: normal,     Pulmonary  Pulmonary exam normal     Other Findings      Lab Results   Component Value Date    WBC 4 56 10/19/2018    HGB 11 5 (L) 10/19/2018    HCT 36 0 (L) 10/19/2018     (H) 10/19/2018     10/19/2018     Lab Results   Component Value Date     (L) 10/23/2015    K 3 7 10/19/2018    CO2 31 10/19/2018     10/19/2018    BUN 15 10/19/2018    CREATININE 0 96 10/19/2018     Lab Results   Component Value Date    INR 1 15 10/19/2018    INR 1 33 (H) 09/10/2017    INR 1 14 09/08/2017    PROTIME 14 8 (H) 10/19/2018    PROTIME 16 8 (H) 09/10/2017    PROTIME 14 8 (H) 09/08/2017     Lab Results   Component Value Date    PTT 30 10/19/2018       Lab Results   Component Value Date    GLUCOSE 104 10/23/2015    GLUCOSE 107 10/22/2015    GLUCOSE 120 10/21/2015       Lab Results   Component Value Date    HGBA1C 5 6 10/19/2018       Type and Screen:  A          Anesthesia Plan  ASA Score- 3     Anesthesia Type- general and regional with ASA Monitors  Additional Monitors:   Airway Plan: ETT  Comment: IS Block  Plan Factors-    Induction- intravenous  Postoperative Plan- Plan for postoperative opioid use       Informed Consent- Anesthetic plan and risks discussed with patient  I personally reviewed this patient with the CRNA  Discussed and agreed on the Anesthesia Plan with the CRNA  Janis Jones

## 2018-11-06 NOTE — PLAN OF CARE
Discharge to home or other facility with appropriate resources Progressing      Maintains or returns to baseline urinary function Progressing      Absence of urinary retention Progressing      Maintains hematologic stability Progressing      Absence or prevention of progression during hospitalization Progressing      Absence of fever/infection during neutropenic period Progressing      Patient/family/caregiver demonstrates understanding of disease process, treatment plan, medications, and discharge instructions Progressing      Maintain or return mobility to safest level of function Progressing      Maintain proper alignment of affected body part Progressing      Verbalizes/displays adequate comfort level or baseline comfort level Progressing      Patient will remain free of falls Progressing      Maintain or return to baseline ADL function Progressing      Maintain or return mobility status to optimal level Progressing      Incision(s), wounds(s) or drain site(s) healing without S/S of infection Progressing      Oral mucous membranes remain intact Progressing

## 2018-11-06 NOTE — H&P
I identified and marked the patient in the pre-op holding area after confirming the surgical consent  No changes to medical health since the H&P was preformed  The patient's [facility] to ensure compliance with applicable state laws

## 2018-11-06 NOTE — ANESTHESIA PROCEDURE NOTES
Peripheral Block    Patient location during procedure: pre-op  Start time: 11/6/2018 8:40 AM  Removal time: 11/6/2018 9:05 AM  Reason for block: at surgeon's request and post-op pain management  Staffing  Anesthesiologist: Erich Vides  Performed: anesthesiologist   Preanesthetic Checklist  Completed: patient identified, site marked, surgical consent, pre-op evaluation, timeout performed, IV checked, risks and benefits discussed and monitors and equipment checked  Peripheral Block  Patient position: supine  Prep: ChloraPrep  Patient monitoring: continuous pulse ox and frequent blood pressure checks  Block type: interscalene  Laterality: left  Injection technique: single-shot  Procedures: ultrasound guided  Ultrasound permanent image saved  Local infiltration: lidocaine  Infiltration strength: 1 %  Dose: 3 mL  Needle  Needle type: pencil-tip   Needle gauge: 21 G  Needle length: 5 cm  Needle localization: ultrasound guidance  Assessment  Injection assessment: incremental injection, local visualized surrounding nerve on ultrasound and no paresthesia on injection  Post-procedure:  site cleaned  patient tolerated the procedure well with no immediate complications  Additional Notes  Sterile prep and drape in accordance with protocol

## 2018-11-07 ENCOUNTER — APPOINTMENT (EMERGENCY)
Dept: CT IMAGING | Facility: HOSPITAL | Age: 68
DRG: 178 | End: 2018-11-07
Payer: MEDICARE

## 2018-11-07 ENCOUNTER — HOSPITAL ENCOUNTER (INPATIENT)
Facility: HOSPITAL | Age: 68
LOS: 6 days | Discharge: NURSING, REHAB OR LONG-TERM CARE | DRG: 178 | End: 2018-11-13
Attending: EMERGENCY MEDICINE | Admitting: HOSPITALIST
Payer: MEDICARE

## 2018-11-07 VITALS
HEIGHT: 75 IN | DIASTOLIC BLOOD PRESSURE: 65 MMHG | WEIGHT: 206 LBS | TEMPERATURE: 99.1 F | BODY MASS INDEX: 25.61 KG/M2 | HEART RATE: 77 BPM | SYSTOLIC BLOOD PRESSURE: 118 MMHG | OXYGEN SATURATION: 95 % | RESPIRATION RATE: 18 BRPM

## 2018-11-07 DIAGNOSIS — M19.012 PRIMARY OSTEOARTHRITIS OF LEFT SHOULDER: ICD-10-CM

## 2018-11-07 DIAGNOSIS — J44.9 COPD (CHRONIC OBSTRUCTIVE PULMONARY DISEASE) (HCC): ICD-10-CM

## 2018-11-07 DIAGNOSIS — J41.0 SIMPLE CHRONIC BRONCHITIS (HCC): ICD-10-CM

## 2018-11-07 DIAGNOSIS — J90 PLEURAL EFFUSION: ICD-10-CM

## 2018-11-07 DIAGNOSIS — J18.9 PNEUMONIA: Primary | ICD-10-CM

## 2018-11-07 PROBLEM — Z96.612 S/P REVERSE TOTAL SHOULDER ARTHROPLASTY, LEFT: Status: ACTIVE | Noted: 2018-11-07

## 2018-11-07 LAB
ALBUMIN SERPL BCP-MCNC: 3.1 G/DL (ref 3.5–5)
ALP SERPL-CCNC: 108 U/L (ref 46–116)
ALT SERPL W P-5'-P-CCNC: 13 U/L (ref 12–78)
ANION GAP SERPL CALCULATED.3IONS-SCNC: 4 MMOL/L (ref 4–13)
ANION GAP SERPL CALCULATED.3IONS-SCNC: 6 MMOL/L (ref 4–13)
APTT PPP: 35 SECONDS (ref 24–36)
AST SERPL W P-5'-P-CCNC: 18 U/L (ref 5–45)
BACTERIA UR QL AUTO: ABNORMAL /HPF
BASOPHILS # BLD AUTO: 0.02 THOUSANDS/ΜL (ref 0–0.1)
BASOPHILS NFR BLD AUTO: 0 % (ref 0–1)
BILIRUB DIRECT SERPL-MCNC: 0.17 MG/DL (ref 0–0.2)
BILIRUB SERPL-MCNC: 0.5 MG/DL (ref 0.2–1)
BILIRUB UR QL STRIP: NEGATIVE
BUN SERPL-MCNC: 19 MG/DL (ref 5–25)
BUN SERPL-MCNC: 20 MG/DL (ref 5–25)
CALCIUM SERPL-MCNC: 8.3 MG/DL (ref 8.3–10.1)
CALCIUM SERPL-MCNC: 8.8 MG/DL (ref 8.3–10.1)
CHLORIDE SERPL-SCNC: 95 MMOL/L (ref 100–108)
CHLORIDE SERPL-SCNC: 97 MMOL/L (ref 100–108)
CLARITY UR: CLEAR
CO2 SERPL-SCNC: 32 MMOL/L (ref 21–32)
CO2 SERPL-SCNC: 33 MMOL/L (ref 21–32)
COLOR UR: YELLOW
CREAT SERPL-MCNC: 1.02 MG/DL (ref 0.6–1.3)
CREAT SERPL-MCNC: 1.03 MG/DL (ref 0.6–1.3)
EOSINOPHIL # BLD AUTO: 0 THOUSAND/ΜL (ref 0–0.61)
EOSINOPHIL NFR BLD AUTO: 0 % (ref 0–6)
ERYTHROCYTE [DISTWIDTH] IN BLOOD BY AUTOMATED COUNT: 13.2 % (ref 11.6–15.1)
ERYTHROCYTE [DISTWIDTH] IN BLOOD BY AUTOMATED COUNT: 13.3 % (ref 11.6–15.1)
GFR SERPL CREATININE-BSD FRML MDRD: 74 ML/MIN/1.73SQ M
GFR SERPL CREATININE-BSD FRML MDRD: 75 ML/MIN/1.73SQ M
GLUCOSE SERPL-MCNC: 112 MG/DL (ref 65–140)
GLUCOSE SERPL-MCNC: 115 MG/DL (ref 65–140)
GLUCOSE UR STRIP-MCNC: NEGATIVE MG/DL
HCT VFR BLD AUTO: 30.4 % (ref 36.5–49.3)
HCT VFR BLD AUTO: 32.3 % (ref 36.5–49.3)
HGB BLD-MCNC: 10 G/DL (ref 12–17)
HGB BLD-MCNC: 10.2 G/DL (ref 12–17)
HGB UR QL STRIP.AUTO: ABNORMAL
IMM GRANULOCYTES # BLD AUTO: 0.03 THOUSAND/UL (ref 0–0.2)
IMM GRANULOCYTES NFR BLD AUTO: 0 % (ref 0–2)
INR PPP: 1.29 (ref 0.86–1.17)
KETONES UR STRIP-MCNC: NEGATIVE MG/DL
LACTATE SERPL-SCNC: 0.8 MMOL/L (ref 0.5–2)
LEUKOCYTE ESTERASE UR QL STRIP: NEGATIVE
LYMPHOCYTES # BLD AUTO: 0.64 THOUSANDS/ΜL (ref 0.6–4.47)
LYMPHOCYTES NFR BLD AUTO: 7 % (ref 14–44)
MCH RBC QN AUTO: 33 PG (ref 26.8–34.3)
MCH RBC QN AUTO: 33.7 PG (ref 26.8–34.3)
MCHC RBC AUTO-ENTMCNC: 31.6 G/DL (ref 31.4–37.4)
MCHC RBC AUTO-ENTMCNC: 32.9 G/DL (ref 31.4–37.4)
MCV RBC AUTO: 102 FL (ref 82–98)
MCV RBC AUTO: 105 FL (ref 82–98)
MONOCYTES # BLD AUTO: 0.95 THOUSAND/ΜL (ref 0.17–1.22)
MONOCYTES NFR BLD AUTO: 11 % (ref 4–12)
NEUTROPHILS # BLD AUTO: 7.26 THOUSANDS/ΜL (ref 1.85–7.62)
NEUTS SEG NFR BLD AUTO: 82 % (ref 43–75)
NITRITE UR QL STRIP: NEGATIVE
NON-SQ EPI CELLS URNS QL MICRO: ABNORMAL /HPF
NRBC BLD AUTO-RTO: 0 /100 WBCS
PH UR STRIP.AUTO: 7 [PH] (ref 4.5–8)
PLATELET # BLD AUTO: 124 THOUSANDS/UL (ref 149–390)
PLATELET # BLD AUTO: 125 THOUSANDS/UL (ref 149–390)
PMV BLD AUTO: 9.1 FL (ref 8.9–12.7)
PMV BLD AUTO: 9.2 FL (ref 8.9–12.7)
POTASSIUM SERPL-SCNC: 3.9 MMOL/L (ref 3.5–5.3)
POTASSIUM SERPL-SCNC: 4.2 MMOL/L (ref 3.5–5.3)
PROT SERPL-MCNC: 6.6 G/DL (ref 6.4–8.2)
PROT UR STRIP-MCNC: NEGATIVE MG/DL
PROTHROMBIN TIME: 15.9 SECONDS (ref 11.8–14.2)
RBC # BLD AUTO: 2.97 MILLION/UL (ref 3.88–5.62)
RBC # BLD AUTO: 3.09 MILLION/UL (ref 3.88–5.62)
RBC #/AREA URNS AUTO: ABNORMAL /HPF
SODIUM SERPL-SCNC: 132 MMOL/L (ref 136–145)
SODIUM SERPL-SCNC: 135 MMOL/L (ref 136–145)
SP GR UR STRIP.AUTO: 1.01 (ref 1–1.03)
TROPONIN I SERPL-MCNC: <0.02 NG/ML
UROBILINOGEN UR QL STRIP.AUTO: 0.2 E.U./DL
WBC # BLD AUTO: 7.86 THOUSAND/UL (ref 4.31–10.16)
WBC # BLD AUTO: 8.9 THOUSAND/UL (ref 4.31–10.16)
WBC #/AREA URNS AUTO: ABNORMAL /HPF

## 2018-11-07 PROCEDURE — 96374 THER/PROPH/DIAG INJ IV PUSH: CPT

## 2018-11-07 PROCEDURE — 80048 BASIC METABOLIC PNL TOTAL CA: CPT | Performed by: PHYSICIAN ASSISTANT

## 2018-11-07 PROCEDURE — 93005 ELECTROCARDIOGRAM TRACING: CPT

## 2018-11-07 PROCEDURE — 36415 COLL VENOUS BLD VENIPUNCTURE: CPT | Performed by: PHYSICIAN ASSISTANT

## 2018-11-07 PROCEDURE — G8978 MOBILITY CURRENT STATUS: HCPCS

## 2018-11-07 PROCEDURE — 97163 PT EVAL HIGH COMPLEX 45 MIN: CPT

## 2018-11-07 PROCEDURE — 85610 PROTHROMBIN TIME: CPT | Performed by: PHYSICIAN ASSISTANT

## 2018-11-07 PROCEDURE — 96361 HYDRATE IV INFUSION ADD-ON: CPT

## 2018-11-07 PROCEDURE — 80076 HEPATIC FUNCTION PANEL: CPT | Performed by: PHYSICIAN ASSISTANT

## 2018-11-07 PROCEDURE — 83605 ASSAY OF LACTIC ACID: CPT | Performed by: PHYSICIAN ASSISTANT

## 2018-11-07 PROCEDURE — 85730 THROMBOPLASTIN TIME PARTIAL: CPT | Performed by: PHYSICIAN ASSISTANT

## 2018-11-07 PROCEDURE — 99285 EMERGENCY DEPT VISIT HI MDM: CPT

## 2018-11-07 PROCEDURE — 81001 URINALYSIS AUTO W/SCOPE: CPT | Performed by: PHYSICIAN ASSISTANT

## 2018-11-07 PROCEDURE — G8979 MOBILITY GOAL STATUS: HCPCS

## 2018-11-07 PROCEDURE — 71275 CT ANGIOGRAPHY CHEST: CPT

## 2018-11-07 PROCEDURE — 87040 BLOOD CULTURE FOR BACTERIA: CPT | Performed by: PHYSICIAN ASSISTANT

## 2018-11-07 PROCEDURE — 96375 TX/PRO/DX INJ NEW DRUG ADDON: CPT

## 2018-11-07 PROCEDURE — 85025 COMPLETE CBC W/AUTO DIFF WBC: CPT | Performed by: PHYSICIAN ASSISTANT

## 2018-11-07 PROCEDURE — 84484 ASSAY OF TROPONIN QUANT: CPT | Performed by: PHYSICIAN ASSISTANT

## 2018-11-07 PROCEDURE — 85027 COMPLETE CBC AUTOMATED: CPT | Performed by: PHYSICIAN ASSISTANT

## 2018-11-07 RX ORDER — ONDANSETRON 2 MG/ML
4 INJECTION INTRAMUSCULAR; INTRAVENOUS ONCE
Status: COMPLETED | OUTPATIENT
Start: 2018-11-07 | End: 2018-11-07

## 2018-11-07 RX ORDER — CALCIUM CARBONATE 200(500)MG
500 TABLET,CHEWABLE ORAL DAILY PRN
Status: DISCONTINUED | OUTPATIENT
Start: 2018-11-07 | End: 2018-11-07 | Stop reason: HOSPADM

## 2018-11-07 RX ORDER — ACETAMINOPHEN 325 MG/1
650 TABLET ORAL ONCE
Status: COMPLETED | OUTPATIENT
Start: 2018-11-07 | End: 2018-11-07

## 2018-11-07 RX ADMIN — ONDANSETRON HYDROCHLORIDE 4 MG: 2 SOLUTION INTRAMUSCULAR; INTRAVENOUS at 22:22

## 2018-11-07 RX ADMIN — ANTACID TABLETS 500 MG: 500 TABLET, CHEWABLE ORAL at 04:17

## 2018-11-07 RX ADMIN — DOCUSATE SODIUM 100 MG: 100 CAPSULE, LIQUID FILLED ORAL at 08:45

## 2018-11-07 RX ADMIN — ACETAMINOPHEN 650 MG: 325 TABLET, FILM COATED ORAL at 22:22

## 2018-11-07 RX ADMIN — TIOTROPIUM BROMIDE 18 MCG: 18 CAPSULE ORAL; RESPIRATORY (INHALATION) at 09:59

## 2018-11-07 RX ADMIN — ATORVASTATIN CALCIUM 40 MG: 40 TABLET, FILM COATED ORAL at 08:45

## 2018-11-07 RX ADMIN — OXYCODONE HYDROCHLORIDE 5 MG: 5 TABLET ORAL at 04:13

## 2018-11-07 RX ADMIN — CEFAZOLIN SODIUM 2000 MG: 2 SOLUTION INTRAVENOUS at 00:48

## 2018-11-07 RX ADMIN — DULOXETINE HYDROCHLORIDE 60 MG: 60 CAPSULE, DELAYED RELEASE ORAL at 08:46

## 2018-11-07 RX ADMIN — APIXABAN 2.5 MG: 2.5 TABLET, FILM COATED ORAL at 08:46

## 2018-11-07 RX ADMIN — GABAPENTIN 300 MG: 300 CAPSULE ORAL at 08:46

## 2018-11-07 RX ADMIN — LORATADINE 10 MG: 10 TABLET ORAL at 08:46

## 2018-11-07 RX ADMIN — Medication 81 MG: at 08:46

## 2018-11-07 RX ADMIN — ISOSORBIDE MONONITRATE 30 MG: 30 TABLET, EXTENDED RELEASE ORAL at 08:45

## 2018-11-07 RX ADMIN — BUDESONIDE AND FORMOTEROL FUMARATE DIHYDRATE 2 PUFF: 160; 4.5 AEROSOL RESPIRATORY (INHALATION) at 08:46

## 2018-11-07 RX ADMIN — CEFEPIME HYDROCHLORIDE 2000 MG: 2 INJECTION, POWDER, FOR SOLUTION INTRAVENOUS at 23:49

## 2018-11-07 RX ADMIN — CARBAMAZEPINE 100 MG: 100 TABLET, CHEWABLE ORAL at 08:46

## 2018-11-07 RX ADMIN — IOHEXOL 85 ML: 350 INJECTION, SOLUTION INTRAVENOUS at 22:47

## 2018-11-07 RX ADMIN — SODIUM CHLORIDE 1000 ML: 0.9 INJECTION, SOLUTION INTRAVENOUS at 22:03

## 2018-11-07 RX ADMIN — OXYCODONE HYDROCHLORIDE 5 MG: 5 TABLET ORAL at 10:05

## 2018-11-07 NOTE — UTILIZATION REVIEW
Initial Clinical Review    Age/Sex: 70 y o  male admitted on 11/6 for elective surgery - OR    Surgery Date: 11/6     Procedure: S/P ARTHROPLASTY SHOULDER REVERSE (Left Shoulder)    Anesthesia: General, Regional    Admission Orders: Date/Time/Statement: Inpatient 11/6/18 @ 1133 Med Surg    Orders Placed This Encounter   Procedures    Inpatient Admission     Standing Status:   Standing     Number of Occurrences:   1     Order Specific Question:   Admitting Physician     Answer:   Hal Enriquez [[id number]]     Order Specific Question:   Level of Care     Answer:   Med Surg [[id number]]     Order Specific Question:   Estimated length of stay     Answer:   More than 2 Midnights     Order Specific Question:   Certification     Answer:   I certify that inpatient services are medically necessary for this patient for a duration of greater than two midnights  See H&P and MD Progress Notes for additional information about the patient's course of treatment  Vital Signs: /65 (BP Location: Right arm)   Pulse 77   Temp 99 1 °F (37 3 °C) (Oral)   Resp 18   Ht 6' 3" (1 905 m)   Wt 93 4 kg (206 lb)   SpO2 95%   BMI 25 75 kg/m²     Diet:        Diet Orders            Start     Ordered    11/06/18 1143  Diet Regular; Regular House  Diet effective now     Question Answer Comment   Diet Type Regular    Regular Regular House    RD to adjust diet per protocol?  Yes        11/06/18 1142          Mobility: OOB  PT/OT eval and treat    DVT Prophylaxis: Sequential compression device    Scheduled Meds:   Current Facility-Administered Medications:  Cefazolin  Intravenous x3   apixaban 2 5 mg Oral BID   aspirin 81 mg Oral Daily   atorvastatin 40 mg Oral Daily   budesonide-formoterol 2 puff Inhalation BID   carBAMazepine 100 mg Oral BID   docusate sodium 100 mg Oral BID   DULoxetine 60 mg Oral Daily   gabapentin 300 mg Oral BID   gabapentin 600 mg Oral HS   isosorbide mononitrate 30 mg Oral Daily   loratadine 10 mg Oral Daily   melatonin 3 mg Oral HS   QUEtiapine 25 mg Oral HS   tiotropium 18 mcg Inhalation Daily   traZODone 50 mg Oral HS     Continuous Infusions:  lactated ringers 100 mL/hr Last Rate: 100 mL/hr (11/06/18 2126)     PRN Meds:     acetaminophen    calcium carbonate    ibuprofen    magnesium hydroxide    morphine injection    nitroglycerin    ondansetron    oxyCODONE po x5

## 2018-11-07 NOTE — DISCHARGE SUMMARY
ORTHOPEDICS DISCHARGE SUMMARY  Bisi Magdaleno 70 y o  male MRN: [id number]  Unit/Bed#: CW3 335-01    Attending Physician: Gretchen Talley    Admitting diagnosis: Primary osteoarthritis of left shoulder [M19 012]    Discharge diagnosis: Primary osteoarthritis of left shoulder [M19 012]    Date of admission: 11/6/2018    Date of discharge: 11/07/18         Procedure: L reverse total shoulder arthroplasty    HPI:  This is a 70y o  year old male that presented to the office with signs and symptoms of left Shoulder osteoarthritis and/or other pathology  They tried and failed conservative treatment measures and wished to proceed with surgical intervention  The risks, benefits, and complications of the procedure were discussed with the patient and informed consent was obtained  Hospital Course: The patient was admitted to the hospital on 11/6/2018 and underwent an uncomplicated left reverse total shoulder arthroplasty  They were transferred to the floor after a brief stay in the post-anesthesia care unit  Their pain was well managed with IV and oral pain medications   On discharge date pt was cleared by PT and the medicine team and determined to be safe for discharge      0  Lab Value Date/Time   HGB 10 2 (L) 11/07/2018 0558   HGB 11 5 (L) 10/19/2018 1404   HGB 10 5 (L) 09/10/2017 1657   HGB 11 3 (L) 09/08/2017 1433   HGB 12 0 08/27/2017 1556   HGB 10 9 (L) 08/21/2017 0451   HGB 11 2 (L) 08/20/2017 0511   HGB 11 5 (L) 08/18/2017 1104   HGB 12 8 07/18/2017 1735   HGB 11 3 (L) 07/16/2017 0459   HGB 10 5 (L) 07/15/2017 1529   HGB 11 1 (L) 07/14/2017 0446   HGB 12 2 07/13/2017 0822   HGB 11 1 (L) 06/28/2017 0550   HGB 11 1 (L) 06/27/2017 0430   HGB 10 7 (L) 06/26/2017 0445   HGB 11 7 (L) 06/25/2017 1322   HGB 10 9 (L) 11/30/2016 1331   HGB 10 0 (L) 11/15/2016 1152   HGB 10 5 (L) 11/09/2016 1119   HGB 8 9 (L) 08/23/2016 0403   HGB 8 9 (L) 08/21/2016 0506   HGB 9 1 (L) 08/20/2016 0507   HGB 9 4 (L) 08/19/2016 0413   HGB 10 3 (L) 08/18/2016 1934   HGB 10 1 (L) 07/06/2016 1315   HGB 10 1 (L) 10/22/2015 1045   HGB 11 1 (L) 10/21/2015 0445   HGB 12 1 08/28/2015 1332       Vital signs remained stable and pt was resuscitated with IVF as needed     Discharge Instructions: The patient was discharged nonweight bearing to the left upper extremity  Refrain from PT/OT until cleared by Surgeon  Take pain medications as instructed  Discharge Medications: For the complete list of discharge medications, please refer to the patient's medication reconciliation

## 2018-11-07 NOTE — PROGRESS NOTES
Progress Note  Dimitri Hernandez 70 y o  male MRN: [id number]  Unit/Bed#: CW3 335-01          ASSESSMENT: 69M pod1 L rTSA      PLAN:   -NWB LUE in abduction pillow  -DVTppx  -Pain Mgmt  -PT/OT  -Dispo  -Will continue to assess for acute blood loss anemia      PE:   LUE: Dressings C/D/I, SILT R/U/M/Ax, +AIN/PIN/Ulnar/Ax motor, fingertips x5 warm and pink       SUBJECTIVE: No acute events, no complaints    Data:     _*_*_*_*_*_*_*_*_*_*_*_*_*_*_*_*_*_*_*_*_*_*_*_*_*_*_*_*_*_*_*_*_*_*_*_*_*_*_*_*_*    Vitals:    11/07/18 0430   BP: 95/56   Pulse: 74   Resp: 18   Temp: 99 °F (37 2 °C)   SpO2: 93%         0  Lab Value Date/Time   HCT 32 3 (L) 11/07/2018 0558   HCT 31 3 (L) 10/22/2015 1045   HGB 10 2 (L) 11/07/2018 0558   HGB 10 1 (L) 10/22/2015 1045   INR 1 15 10/19/2018 1404   INR 1 07 08/28/2015 1332   WBC 7 86 11/07/2018 0558   WBC 11 38 (H) 10/22/2015 1045   ESR 14 (H) 08/28/2015 1332         Current Facility-Administered Medications:  acetaminophen 650 mg Oral Q6H PRN Chuyita Grange, PA-C    apixaban 2 5 mg Oral BID Chuyita Grange, PA-C    artificial tear  Both Eyes TID PRN Chuyita Grange, PA-C    aspirin 81 mg Oral Daily Chuyita Grange, PA-C    atorvastatin 40 mg Oral Daily Chuyita Grange, PA-C    budesonide-formoterol 2 puff Inhalation BID Chuyita Grange, PA-C    calcium carbonate 500 mg Oral Daily PRN Stevens Ip, PA-C    carBAMazepine 100 mg Oral BID Chuyita Grange, PA-C    docusate sodium 100 mg Oral BID Chuyita Grange, PA-C    DULoxetine 60 mg Oral Daily Chuyita Grange, PA-C    gabapentin 300 mg Oral BID Chuyita Grange, PA-C    gabapentin 600 mg Oral HS Chuyita Grange, PA-C    ibuprofen 400 mg Oral Q6H PRN Chuyita Grange, PA-C    isosorbide mononitrate 30 mg Oral Daily Chuyita Grange, PA-C    lactated ringers 100 mL/hr Intravenous Continuous Chuyita Granjesus, PA-C Last Rate: 100 mL/hr (11/06/18 2126)   loratadine 10 mg Oral Daily Chuyita Grange, PA-C    magnesium hydroxide 5 mL Oral Daily PRN Yves Lindo Robert, NEY    melatonin 3 mg Oral HS Mukesh Shines, NEY    morphine injection 2 mg Intravenous Q3H PRN Mukesh Shines, NEY    nitroglycerin 0 4 mg Sublingual Q5 Min PRN Mukesh Shines, NEY    ondansetron 4 mg Intravenous Q6H PRN Mukesh Shines, NEY    oxyCODONE 10 mg Oral Q4H PRN Mukesh Shines, NEY    oxyCODONE 5 mg Oral Q4H PRN Mukesh Shines, NEY    polyethylene glycol 17 g Oral Daily PRN Mukesh Shincasie, NEY    QUEtiapine 25 mg Oral HS Mukesh Shincasie, NEY    tiotropium 18 mcg Inhalation Daily Mukesh Ruth, NEY    traZODone 50 mg Oral HS Mukesh Ruth, NEY Calderon MD

## 2018-11-07 NOTE — PHYSICAL THERAPY NOTE
PT Initial Evaluation     11/07/18 0833   Note Type   Note type Eval only   Pain Assessment   Pain Assessment 0-10   Pain Score 5   Pain Type Acute pain;Surgical pain   Pain Location Shoulder   Pain Orientation Left   Hospital Pain Intervention(s) Repositioned; Ambulation/increased activity   Response to Interventions pt noted no change in pain scale following amb but that his shoulder felt better with respositioning   Home Living   Type of Home Other (Comment)   Home Layout One level   Additional Comments pt states he currently resides in a short term rehab facility but plans to move into a first floor apt at the end of the month   Prior Function   Level of [place] Independent with ADLs and functional mobility   Lives With Facility staff   Receives Help From Family  (dtr)   Falls in the last 6 months 0   Vocational Retired   Comments pt states facility staff is available to help with all needs, dtr lives local   Restrictions/Precautions   [place] Bearing Precautions Per Order Yes   RUE Weight Bearing Per Order FWB   LUE Weight Bearing Per Order NWB   RLE Weight Bearing Per Order FWB   LLE Weight Bearing Per Order FWB   Braces or Orthoses Sling  (abd pillow)   Other Precautions WBS; Fall Risk;Pain   General   Additional Pertinent History pt states he has COPD but is not on O2 at home   Family/Caregiver Present No   Cognition   Overall Cognitive Status WFL   Arousal/Participation Alert   Orientation Level Oriented X4   Memory Within functional limits   Following Commands Follows all commands and directions without difficulty   RUE Assessment   RUE Assessment WFL  (able to assist with transfers)   LUE Assessment   LUE Assessment X  (NWB in abd sling)   RLE Assessment   RLE Assessment WFL   Strength RLE   RLE Overall Strength 3/5  (hip flex, knee flex/ext)   LLE Assessment   LLE Assessment WFL   Strength LLE   LLE Overall Strength 3/5  (hip flex, knee flex/ext)   Coordination   Movements are Fluid and Coordinated 0   Coordination and Movement Description instability 2* to orthopaedic restrictions and fatigue   Bed Mobility   Additional Comments NT- pt sitting in recliner upon PT arrival   Transfers   Sit to Stand 3  Moderate assistance   Additional items Armrests; Increased time required;Verbal cues; Assist x 1   Stand to Sit 4  Minimal assistance   Additional items Assist x 1; Armrests   Additional Comments VC for technique, increase time 2* to weakness/fatigue   Ambulation/Elevation   Gait pattern Poor UE support; Improper Weight shift; Antalgic; Forward Flexion; Wide ALETA; Short stride; Excessively slow; Ataxia   Gait Assistance 4  Minimal assist   Additional items Assist x 1   Assistive Device None   Distance 45'x2 with standing rest breaks    Balance   Dynamic Sitting Fair   Static Standing Fair -   Dynamic Standing Poor +   Endurance Deficit   Endurance Deficit Yes   Endurance Deficit Description pt SOB with increased activity   Activity Tolerance   Activity Tolerance Patient limited by fatigue;Treatment limited secondary to medical complications (Comment)   Nurse Made Aware yes- nsg clear to see   Assessment   Prognosis Fair   Problem List Decreased strength;Decreased range of motion;Decreased endurance; Impaired balance;Decreased mobility; Decreased coordination;Decreased cognition; Impaired judgement;Decreased safety awareness;Decreased skin integrity;Orthopedic restrictions;Pain   Assessment Pt is a 70year old male admitted to [facility] on 11/6/18 s/p L rev TSA with LHB tenodesis and NWB LUE with abd pillow and sling  Current medical needs include WB restrictions, abd sling, Peripheral IV, incision care, fall risk precautions, neurovascular checks  PMHx significant for afib, HTN, CAD, CHF, COPD, anxiety, depression, chronic pain, gait instability and abnormal labs  Upon PT evaluation pt presents with functional impairments including Decreased strength;Decreased range of motion;Decreased endurance; Impaired balance;Decreased mobility; Decreased coordination;Decreased cognition; Impaired judgement;Decreased safety awareness;Decreased skin integrity;Orthopedic restrictions;Pain and gait deficits including Poor UE support; Improper Weight shift; Antalgic; Forward Flexion; Wide ALETA; Short stride; Excessively slow; Ataxia  Objective measures on the Barthel Index also reveal limitations  Pt transf sit to stand/ stand to sit min-mod assist x1 with vc for technique  Pt amb 45'x2 w min assist x1 and standing rest breaks needed  Pt demonstrated SOB with increased activity relieved with standing rests  Based on pt current functional deficits and medical hx, he presented for PT for a high complexity evaluation and his projected prognosis is fair  Current presentation is unstable  Prior to admission pt was residing in a temporary rehab facility and was prev Independent with all ADLs and mobility needs  Pt plans to move into a first floor apt at the end of the month with dtr to assist as needed  Based on the previously mentioned functional limitations and complex medical hx, recommend pt continue with skilled PT during hospital stay and recommend return to acute rehab facility when medically cleared  Barriers to Discharge None   Goals   Patient Goals return to rehab facility   STG Expiration Date 11/17/18   Short Term Goal #1 1  Pt will demonstrate understanding of NWB LUE status and proper fit of sling 3/3 times for safety and awareness of precautions  2  Pt will improve dynamic standing balance to improve mobility during ADLs  3  Pt will be independent with bed mobility (roll R) for comfort and ease of transfers  4  Pt will transfer sit to stand/ stand to sit min assist x1 for improved household mobility  5  Pt will amb 150' supervision with standing rests as needed to demonstrate endurance with household and community distances   6  Pt will be independent with his HEP as per post op exercise program   Plan   Treatment/Interventions Functional transfer training;LE strengthening/ROM; Elevations; Therapeutic exercise; Endurance training;Cognitive reorientation;Patient/family training;Equipment eval/education; Bed mobility;Gait training; Compensatory technique education;Continued evaluation;Spoke to nursing   PT Frequency Twice a day   Recommendation   Recommendation Outpatient PT; Other (Comment)  (return to prev temporary rehab facility)   Barthel Index   Feeding 5   Bathing 0   Grooming Score 5   Dressing Score 5   Bladder Score 10   Bowels Score 10   Toilet Use Score 5   Transfers (Bed/Chair) Score 5   Mobility (Level Surface) Score 0   Stairs Score 0   Barthel Index Score 45

## 2018-11-07 NOTE — PLAN OF CARE
Problem: PHYSICAL THERAPY ADULT  Goal: Performs mobility at highest level of function for planned discharge setting  See evaluation for individualized goals  Treatment/Interventions: Functional transfer training, LE strengthening/ROM, Elevations, Therapeutic exercise, Endurance training, Cognitive reorientation, Patient/family training, Equipment eval/education, Bed mobility, Gait training, Compensatory technique education, Continued evaluation, Spoke to nursing          See flowsheet documentation for full assessment, interventions and recommendations  Prognosis: Fair  Problem List: Decreased strength, Decreased range of motion, Decreased endurance, Impaired balance, Decreased mobility, Decreased coordination, Decreased cognition, Impaired judgement, Decreased safety awareness, Decreased skin integrity, Orthopedic restrictions, Pain  Assessment: Pt is a 70year old male admitted to [facility] on 11/6/18 s/p L rev TSA with LHB tenodesis and NWB LUE with abd pillow and sling  Current medical needs include WB restrictions, abd sling, Peripheral IV, incision care, fall risk precautions, neurovascular checks  PMHx significant for afib, HTN, CAD, CHF, COPD, anxiety, depression, chronic pain, gait instability and abnormal labs  Upon PT evaluation pt presents with functional impairments including Decreased strength;Decreased range of motion;Decreased endurance; Impaired balance;Decreased mobility; Decreased coordination;Decreased cognition; Impaired judgement;Decreased safety awareness;Decreased skin integrity;Orthopedic restrictions;Pain and gait deficits including Poor UE support; Improper Weight shift; Antalgic; Forward Flexion; Wide ALETA; Short stride; Excessively slow; Ataxia  Objective measures on the Barthel Index also reveal limitations  Pt transf sit to stand/ stand to sit min-mod assist x1 with vc for technique  Pt amb 45'x2 w min assist x1 and standing rest breaks needed   Pt demonstrated SOB with increased activity relieved with standing rests  Based on pt current functional deficits and medical hx, he presented for PT for a high complexity evaluation and his projected prognosis is fair  Current presentation is unstable  Prior to admission pt was residing in a temporary rehab facility and was prev Independent with all ADLs and mobility needs  Pt plans to move into a first floor apt at the end of the month with dtr to assist as needed  Based on the previously mentioned functional limitations and complex medical hx, recommend pt continue with skilled PT during hospital stay and recommend return to acute rehab facility when medically cleared  Barriers to Discharge: None     Recommendation: Outpatient PT, Other (Comment) (return to prev temporary rehab facility)          See flowsheet documentation for full assessment

## 2018-11-07 NOTE — SOCIAL WORK
CM received notice from rounds that if patient is a bed hold at [facility], then patient can be discharged today  JOLENE Nova) contacted [facility] regarding PT services at discharge due to the discharge recommendation of OPPT  Per Isabel, PT/OT services can be provided by [facility] upon discharge  Patient is agreeable to pay for the transport (billed to home) via [facility]  Cm arranged transport for 1:30/2pm this afternoon through [facility]  Patient, RN, and MD made aware      Numbers for report:  P: [phone]  N:[phone]

## 2018-11-07 NOTE — CONSULTS
Inpatient consult to Acute Pain Service  Consult performed by: Dom Carranza ordered by: Edith Guzman      Patient comfortable on POD#1 from total shoulder replacement  Exparel block working well  He had some numbness and weakness in his left hand last night, most likely from the IS block, but that has worn off now  APS to sign off

## 2018-11-07 NOTE — SOCIAL WORK
Met with pt at bedside to explain CM role  Pt states he wena bad divorce and has been living at [facility]  Pt states he is not getting rehab there  Pt plans on returning there and is hoping to start rehab  Pt states he was indep prior to admission  Pt also states that his dtr got him a 1st fl apt in [place] and he plans to go there after rehab  Referral sent back to [facility] per pts request   Pt states that his dtr Stanley Allen is his -[phone]  Pts PCP is Dr Ivelisse Galaviz  Pt denies any substance abuse  Pt has hx of depression but I snot in therapy and does not see a psychiatrist       Tong Chavarria received discharge checklist  Content reviewed  Patient/caregiver encouraged to participate in discharge plan of care prior to discharge home  CM reviewed d/c planning process including the following: identifying help at home, patient preference for d/c planning needs, Discharge Lounge, [facility] Meds to Bed program, availability of treatment team to discuss questions or concerns patient and/or family may have regarding understanding medications and recognizing signs and symptoms once discharged  CM also encouraged patient to follow up with all recommended appointments after discharge  Patient advised of importance for patient and family to participate in managing patients medical well being

## 2018-11-07 NOTE — PROGRESS NOTES
Pt reported decreased sensation and increasing numbness in the LUE  Called ortho and was told to continue monitoring, no new orders at this time

## 2018-11-07 NOTE — PROGRESS NOTES
Internal Medicine Progress Note  Patient: Urvashi Stiles  Age/sex: 70 y o  male  Medical Record #: [id number]      ASSESSMENT/PLAN:  Urvashi Stiles is seen and examined and mangement for following issues:    # Lt glenohumeral OA s/p reverse TSA with long head biceps tenodesis: Pain control adequate per patient  Reviewed postop day 1 hemoglobin stable as outlined below  DVT prophylaxis in place and reviewed      # Atrial fibrillation: Resume Eliquis 2 5mg 2x daily in AM  Rate currently controlled      # Chronic combined systolic and diastolic heart failure:   Remains euvolemic continue to hold Demadex  and spironolactone today       # COPD:  Respiratory status stable Continue Spiriva and Symbicort  Monitor O2 sats closely      # Depression/Anxiety: Continue duloxetine 60mg daily, Seroquel 25mg at bedtime, carbamazepine 100mg 2x daily, and trazodone 50mg at bedtime  # hyponatremia: Likely due to decreased intake as well as pain issues yesterday: Encourage increased salt intake in diet follow-up monitor BMP  Subjective: Patient seen and examined   New or overnight issues include no significant overnight events or reported nursing issues    ROS:   GI: denies abdominal pain, change bowel habits or reflux symptoms  Neuro: No new neurologic changes  Respiratory: No Cough, SOB  Cardiovascular: No CP, palpitations     Scheduled Meds:    Current Facility-Administered Medications:  acetaminophen 650 mg Oral Q6H PRN Tiara An PA-C    apixaban 2 5 mg Oral BID YOVANNY Taylor-MAGED    artificial tear  Both Eyes TID PRN Tiara An PA-C    aspirin 81 mg Oral Daily Tiara An PA-C    atorvastatin 40 mg Oral Daily Tiara An PA-C    budesonide-formoterol 2 puff Inhalation BID Tiara An PA-C    calcium carbonate 500 mg Oral Daily PRN Rosi Espino PA-C    carBAMazepine 100 mg Oral BID Tiara An PA-C    docusate sodium 100 mg Oral BID Tiara An PA-C    DULoxetine 60 mg Oral Daily Mao Gess, PA-C    gabapentin 300 mg Oral BID Mao Gess, PA-C    gabapentin 600 mg Oral HS Mao Gess, PA-C    ibuprofen 400 mg Oral Q6H PRN Mao Gess, PA-C    isosorbide mononitrate 30 mg Oral Daily Mao Gess, PA-C    lactated ringers 100 mL/hr Intravenous Continuous Mao Gess, PA-C Last Rate: 100 mL/hr (11/06/18 2126)   loratadine 10 mg Oral Daily Mao Gess, PA-C    magnesium hydroxide 5 mL Oral Daily PRN Mao Gess, PA-C    melatonin 3 mg Oral HS Mao Gess, PA-C    morphine injection 2 mg Intravenous Q3H PRN Mao Gess, PA-C    nitroglycerin 0 4 mg Sublingual Q5 Min PRN Mao Gess, PA-C    ondansetron 4 mg Intravenous Q6H PRN Mao Gess, PA-C    oxyCODONE 10 mg Oral Q4H PRN Mao Gess, PA-C    oxyCODONE 5 mg Oral Q4H PRN Mao Gess, PA-C    polyethylene glycol 17 g Oral Daily PRN Mao Gess, PA-C    QUEtiapine 25 mg Oral HS Mao Gess, PA-C    tiotropium 18 mcg Inhalation Daily Mao Gess, PA-C    traZODone 50 mg Oral HS Mao Gess, PA-C        Labs:       Results from last 7 days  Lab Units 11/07/18  0558   WBC Thousand/uL 7 86   HEMOGLOBIN g/dL 10 2*   HEMATOCRIT % 32 3*   PLATELETS Thousands/uL 125*       Results from last 7 days  Lab Units 11/07/18  0537   POTASSIUM mmol/L 4 2   CHLORIDE mmol/L 95*   CO2 mmol/L 33*   BUN mg/dL 19   CREATININE mg/dL 1 02   CALCIUM mg/dL 8 3                Glucose (mg/dL)   Date Value   10/23/2015 104   10/22/2015 107   10/21/2015 120   08/28/2015 114       Labs reviewed    Physical Examination:  Vitals:   Vitals:    11/06/18 1510 11/06/18 1944 11/06/18 2307 11/07/18 0430   BP: 103/59 105/77 106/53 95/56   BP Location: Right arm Right arm Right arm Right arm   Pulse: 72 75 57 74   Resp: 18 18 16 18   Temp: 98 °F (36 7 °C) 98 4 °F (36 9 °C) 98 3 °F (36 8 °C) 99 °F (37 2 °C)   TempSrc: Oral Oral Oral Oral   SpO2: 97% 98% 98% 93%   Weight:       Height:           GEN: NAD  RESP: CTAB, no R/R/W  CV: +S1 S2, regular rate, no rubs  ABD: soft, NT, ND, normal BS   : catheter removed  EXT: DP pulses intact b/l  Neuro: AAOx3    [x ] Total time spent: 30 Mins and greater than 50% of this time was spent counseling/coordinating care

## 2018-11-08 PROBLEM — R06.02 SOB (SHORTNESS OF BREATH): Status: ACTIVE | Noted: 2018-11-08

## 2018-11-08 PROBLEM — J18.9 PNEUMONIA: Status: ACTIVE | Noted: 2018-11-08

## 2018-11-08 LAB
ANION GAP SERPL CALCULATED.3IONS-SCNC: 4 MMOL/L (ref 4–13)
ATRIAL RATE: 81 BPM
ATRIAL RATE: 86 BPM
BASOPHILS # BLD AUTO: 0.02 THOUSANDS/ΜL (ref 0–0.1)
BASOPHILS NFR BLD AUTO: 0 % (ref 0–1)
BUN SERPL-MCNC: 17 MG/DL (ref 5–25)
CALCIUM SERPL-MCNC: 8.2 MG/DL (ref 8.3–10.1)
CHLORIDE SERPL-SCNC: 101 MMOL/L (ref 100–108)
CO2 SERPL-SCNC: 29 MMOL/L (ref 21–32)
CREAT SERPL-MCNC: 0.89 MG/DL (ref 0.6–1.3)
EOSINOPHIL # BLD AUTO: 0.01 THOUSAND/ΜL (ref 0–0.61)
EOSINOPHIL NFR BLD AUTO: 0 % (ref 0–6)
ERYTHROCYTE [DISTWIDTH] IN BLOOD BY AUTOMATED COUNT: 13.4 % (ref 11.6–15.1)
GFR SERPL CREATININE-BSD FRML MDRD: 87 ML/MIN/1.73SQ M
GLUCOSE SERPL-MCNC: 115 MG/DL (ref 65–140)
HCT VFR BLD AUTO: 27.6 % (ref 36.5–49.3)
HGB BLD-MCNC: 9.1 G/DL (ref 12–17)
IMM GRANULOCYTES # BLD AUTO: 0.01 THOUSAND/UL (ref 0–0.2)
IMM GRANULOCYTES NFR BLD AUTO: 0 % (ref 0–2)
LYMPHOCYTES # BLD AUTO: 0.7 THOUSANDS/ΜL (ref 0.6–4.47)
LYMPHOCYTES NFR BLD AUTO: 9 % (ref 14–44)
MCH RBC QN AUTO: 34.1 PG (ref 26.8–34.3)
MCHC RBC AUTO-ENTMCNC: 33 G/DL (ref 31.4–37.4)
MCV RBC AUTO: 103 FL (ref 82–98)
MONOCYTES # BLD AUTO: 0.81 THOUSAND/ΜL (ref 0.17–1.22)
MONOCYTES NFR BLD AUTO: 11 % (ref 4–12)
NEUTROPHILS # BLD AUTO: 5.86 THOUSANDS/ΜL (ref 1.85–7.62)
NEUTS SEG NFR BLD AUTO: 80 % (ref 43–75)
NRBC BLD AUTO-RTO: 0 /100 WBCS
PLATELET # BLD AUTO: 95 THOUSANDS/UL (ref 149–390)
PMV BLD AUTO: 9.5 FL (ref 8.9–12.7)
POTASSIUM SERPL-SCNC: 3.9 MMOL/L (ref 3.5–5.3)
QRS AXIS: 106 DEGREES
QRS AXIS: 107 DEGREES
QRSD INTERVAL: 100 MS
QRSD INTERVAL: 98 MS
QT INTERVAL: 354 MS
QT INTERVAL: 356 MS
QTC INTERVAL: 389 MS
QTC INTERVAL: 405 MS
RBC # BLD AUTO: 2.67 MILLION/UL (ref 3.88–5.62)
SODIUM SERPL-SCNC: 134 MMOL/L (ref 136–145)
T WAVE AXIS: -41 DEGREES
T WAVE AXIS: -53 DEGREES
VENTRICULAR RATE: 72 BPM
VENTRICULAR RATE: 79 BPM
WBC # BLD AUTO: 7.41 THOUSAND/UL (ref 4.31–10.16)

## 2018-11-08 PROCEDURE — 99223 1ST HOSP IP/OBS HIGH 75: CPT | Performed by: HOSPITALIST

## 2018-11-08 PROCEDURE — 99232 SBSQ HOSP IP/OBS MODERATE 35: CPT | Performed by: INTERNAL MEDICINE

## 2018-11-08 PROCEDURE — G0009 ADMIN PNEUMOCOCCAL VACCINE: HCPCS | Performed by: HOSPITALIST

## 2018-11-08 PROCEDURE — 97163 PT EVAL HIGH COMPLEX 45 MIN: CPT

## 2018-11-08 PROCEDURE — 90670 PCV13 VACCINE IM: CPT | Performed by: HOSPITALIST

## 2018-11-08 PROCEDURE — 85025 COMPLETE CBC W/AUTO DIFF WBC: CPT | Performed by: HOSPITALIST

## 2018-11-08 PROCEDURE — 93010 ELECTROCARDIOGRAM REPORT: CPT | Performed by: INTERNAL MEDICINE

## 2018-11-08 PROCEDURE — 80048 BASIC METABOLIC PNL TOTAL CA: CPT | Performed by: HOSPITALIST

## 2018-11-08 RX ORDER — SODIUM CHLORIDE 9 MG/ML
100 INJECTION, SOLUTION INTRAVENOUS CONTINUOUS
Status: DISPENSED | OUTPATIENT
Start: 2018-11-08 | End: 2018-11-09

## 2018-11-08 RX ORDER — ACETAMINOPHEN 325 MG/1
325 TABLET ORAL EVERY 6 HOURS PRN
Status: DISCONTINUED | OUTPATIENT
Start: 2018-11-08 | End: 2018-11-13 | Stop reason: HOSPADM

## 2018-11-08 RX ORDER — ISOSORBIDE MONONITRATE 30 MG/1
30 TABLET, EXTENDED RELEASE ORAL DAILY
Status: DISCONTINUED | OUTPATIENT
Start: 2018-11-08 | End: 2018-11-13 | Stop reason: HOSPADM

## 2018-11-08 RX ORDER — OXYCODONE HYDROCHLORIDE 5 MG/1
5 TABLET ORAL EVERY 4 HOURS PRN
Status: DISCONTINUED | OUTPATIENT
Start: 2018-11-08 | End: 2018-11-13 | Stop reason: HOSPADM

## 2018-11-08 RX ORDER — SPIRONOLACTONE 25 MG/1
25 TABLET ORAL DAILY
Status: DISCONTINUED | OUTPATIENT
Start: 2018-11-08 | End: 2018-11-13 | Stop reason: HOSPADM

## 2018-11-08 RX ORDER — TRAZODONE HYDROCHLORIDE 50 MG/1
50 TABLET ORAL
Status: DISCONTINUED | OUTPATIENT
Start: 2018-11-08 | End: 2018-11-13 | Stop reason: HOSPADM

## 2018-11-08 RX ORDER — TORSEMIDE 20 MG/1
40 TABLET ORAL DAILY
Status: DISCONTINUED | OUTPATIENT
Start: 2018-11-08 | End: 2018-11-13 | Stop reason: HOSPADM

## 2018-11-08 RX ORDER — BUDESONIDE AND FORMOTEROL FUMARATE DIHYDRATE 160; 4.5 UG/1; UG/1
2 AEROSOL RESPIRATORY (INHALATION) 2 TIMES DAILY
Status: DISCONTINUED | OUTPATIENT
Start: 2018-11-08 | End: 2018-11-13 | Stop reason: HOSPADM

## 2018-11-08 RX ORDER — ASPIRIN 81 MG/1
81 TABLET, CHEWABLE ORAL DAILY
Status: DISCONTINUED | OUTPATIENT
Start: 2018-11-08 | End: 2018-11-13 | Stop reason: HOSPADM

## 2018-11-08 RX ORDER — DOCUSATE SODIUM 100 MG/1
100 CAPSULE, LIQUID FILLED ORAL 2 TIMES DAILY
Status: DISCONTINUED | OUTPATIENT
Start: 2018-11-08 | End: 2018-11-13 | Stop reason: HOSPADM

## 2018-11-08 RX ORDER — LANOLIN ALCOHOL/MO/W.PET/CERES
3 CREAM (GRAM) TOPICAL
Status: DISCONTINUED | OUTPATIENT
Start: 2018-11-08 | End: 2018-11-13 | Stop reason: HOSPADM

## 2018-11-08 RX ORDER — ACETAMINOPHEN 325 MG/1
650 TABLET ORAL EVERY 6 HOURS PRN
Status: DISCONTINUED | OUTPATIENT
Start: 2018-11-08 | End: 2018-11-13 | Stop reason: HOSPADM

## 2018-11-08 RX ORDER — QUETIAPINE FUMARATE 25 MG/1
25 TABLET, FILM COATED ORAL
Status: DISCONTINUED | OUTPATIENT
Start: 2018-11-08 | End: 2018-11-13 | Stop reason: HOSPADM

## 2018-11-08 RX ORDER — ONDANSETRON 2 MG/ML
4 INJECTION INTRAMUSCULAR; INTRAVENOUS EVERY 6 HOURS PRN
Status: DISCONTINUED | OUTPATIENT
Start: 2018-11-08 | End: 2018-11-13 | Stop reason: HOSPADM

## 2018-11-08 RX ORDER — IBUPROFEN 400 MG/1
400 TABLET ORAL EVERY 6 HOURS PRN
Status: DISCONTINUED | OUTPATIENT
Start: 2018-11-08 | End: 2018-11-13 | Stop reason: HOSPADM

## 2018-11-08 RX ORDER — ATORVASTATIN CALCIUM 40 MG/1
40 TABLET, FILM COATED ORAL DAILY
Status: DISCONTINUED | OUTPATIENT
Start: 2018-11-08 | End: 2018-11-13 | Stop reason: HOSPADM

## 2018-11-08 RX ORDER — GABAPENTIN 300 MG/1
300 CAPSULE ORAL 3 TIMES DAILY
Status: DISCONTINUED | OUTPATIENT
Start: 2018-11-08 | End: 2018-11-13 | Stop reason: HOSPADM

## 2018-11-08 RX ORDER — CARBAMAZEPINE 100 MG/1
100 TABLET, CHEWABLE ORAL 2 TIMES DAILY
Status: DISCONTINUED | OUTPATIENT
Start: 2018-11-08 | End: 2018-11-13 | Stop reason: HOSPADM

## 2018-11-08 RX ORDER — LORATADINE 10 MG/1
10 TABLET ORAL DAILY
Status: DISCONTINUED | OUTPATIENT
Start: 2018-11-08 | End: 2018-11-13 | Stop reason: HOSPADM

## 2018-11-08 RX ORDER — DULOXETIN HYDROCHLORIDE 60 MG/1
60 CAPSULE, DELAYED RELEASE ORAL DAILY
Status: DISCONTINUED | OUTPATIENT
Start: 2018-11-08 | End: 2018-11-13 | Stop reason: HOSPADM

## 2018-11-08 RX ADMIN — APIXABAN 2.5 MG: 2.5 TABLET, FILM COATED ORAL at 08:44

## 2018-11-08 RX ADMIN — VANCOMYCIN HYDROCHLORIDE 1250 MG: 1 INJECTION, POWDER, LYOPHILIZED, FOR SOLUTION INTRAVENOUS at 00:27

## 2018-11-08 RX ADMIN — VANCOMYCIN HYDROCHLORIDE 1500 MG: 1 INJECTION, POWDER, LYOPHILIZED, FOR SOLUTION INTRAVENOUS at 12:46

## 2018-11-08 RX ADMIN — CARBAMAZEPINE 100 MG: 100 TABLET, CHEWABLE ORAL at 08:44

## 2018-11-08 RX ADMIN — ONDANSETRON HYDROCHLORIDE 4 MG: 2 SOLUTION INTRAMUSCULAR; INTRAVENOUS at 01:30

## 2018-11-08 RX ADMIN — GABAPENTIN 300 MG: 300 CAPSULE ORAL at 08:44

## 2018-11-08 RX ADMIN — CEFEPIME HYDROCHLORIDE 1000 MG: 1 INJECTION, POWDER, FOR SOLUTION INTRAMUSCULAR; INTRAVENOUS at 23:23

## 2018-11-08 RX ADMIN — QUETIAPINE 25 MG: 25 TABLET ORAL at 22:54

## 2018-11-08 RX ADMIN — GABAPENTIN 300 MG: 300 CAPSULE ORAL at 20:42

## 2018-11-08 RX ADMIN — DOCUSATE SODIUM 100 MG: 100 CAPSULE, LIQUID FILLED ORAL at 17:10

## 2018-11-08 RX ADMIN — TRAZODONE HYDROCHLORIDE 50 MG: 50 TABLET ORAL at 01:31

## 2018-11-08 RX ADMIN — ACETAMINOPHEN 650 MG: 325 TABLET, FILM COATED ORAL at 01:31

## 2018-11-08 RX ADMIN — PNEUMOCOCCAL 13-VALENT CONJUGATE VACCINE 0.5 ML: 2.2; 2.2; 2.2; 2.2; 2.2; 4.4; 2.2; 2.2; 2.2; 2.2; 2.2; 2.2; 2.2 INJECTION, SUSPENSION INTRAMUSCULAR at 06:10

## 2018-11-08 RX ADMIN — BUDESONIDE AND FORMOTEROL FUMARATE DIHYDRATE 2 PUFF: 160; 4.5 AEROSOL RESPIRATORY (INHALATION) at 17:10

## 2018-11-08 RX ADMIN — TRAZODONE HYDROCHLORIDE 50 MG: 50 TABLET ORAL at 22:54

## 2018-11-08 RX ADMIN — SODIUM CHLORIDE 100 ML/HR: 0.9 INJECTION, SOLUTION INTRAVENOUS at 01:33

## 2018-11-08 RX ADMIN — ASPIRIN 81 MG 81 MG: 81 TABLET ORAL at 08:44

## 2018-11-08 RX ADMIN — SPIRONOLACTONE 25 MG: 25 TABLET ORAL at 08:44

## 2018-11-08 RX ADMIN — MELATONIN 3 MG: at 22:54

## 2018-11-08 RX ADMIN — TIOTROPIUM BROMIDE 18 MCG: 18 CAPSULE ORAL; RESPIRATORY (INHALATION) at 08:47

## 2018-11-08 RX ADMIN — ATORVASTATIN CALCIUM 40 MG: 40 TABLET, FILM COATED ORAL at 08:43

## 2018-11-08 RX ADMIN — QUETIAPINE 25 MG: 25 TABLET ORAL at 01:32

## 2018-11-08 RX ADMIN — DULOXETINE HYDROCHLORIDE 60 MG: 60 CAPSULE, DELAYED RELEASE ORAL at 08:44

## 2018-11-08 RX ADMIN — GABAPENTIN 300 MG: 300 CAPSULE ORAL at 17:10

## 2018-11-08 RX ADMIN — TORSEMIDE 40 MG: 20 TABLET ORAL at 08:44

## 2018-11-08 RX ADMIN — MELATONIN 3 MG: at 01:31

## 2018-11-08 RX ADMIN — OXYCODONE HYDROCHLORIDE 5 MG: 5 TABLET ORAL at 01:31

## 2018-11-08 RX ADMIN — CEFEPIME HYDROCHLORIDE 1000 MG: 1 INJECTION, POWDER, FOR SOLUTION INTRAMUSCULAR; INTRAVENOUS at 12:09

## 2018-11-08 RX ADMIN — GABAPENTIN 300 MG: 300 CAPSULE ORAL at 01:31

## 2018-11-08 RX ADMIN — APIXABAN 2.5 MG: 2.5 TABLET, FILM COATED ORAL at 17:10

## 2018-11-08 RX ADMIN — LORATADINE 10 MG: 10 TABLET ORAL at 08:43

## 2018-11-08 RX ADMIN — BUDESONIDE AND FORMOTEROL FUMARATE DIHYDRATE 2 PUFF: 160; 4.5 AEROSOL RESPIRATORY (INHALATION) at 08:47

## 2018-11-08 RX ADMIN — DOCUSATE SODIUM 100 MG: 100 CAPSULE, LIQUID FILLED ORAL at 08:44

## 2018-11-08 RX ADMIN — ISOSORBIDE MONONITRATE 30 MG: 30 TABLET, EXTENDED RELEASE ORAL at 08:43

## 2018-11-08 RX ADMIN — CARBAMAZEPINE 100 MG: 100 TABLET, CHEWABLE ORAL at 17:10
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
## 2018-11-08 NOTE — PLAN OF CARE
Problem: PHYSICAL THERAPY ADULT  Goal: Performs mobility at highest level of function for planned discharge setting  See evaluation for individualized goals  Treatment/Interventions: ADL retraining, Functional transfer training, LE strengthening/ROM, Therapeutic exercise, Endurance training, Patient/family training, Equipment eval/education, Bed mobility, Gait training, Spoke to nursing, OT  Equipment Recommended:  (TBD at [facility])       See flowsheet documentation for full assessment, interventions and recommendations  Prognosis: Good  Problem List: Decreased strength, Decreased endurance, Decreased range of motion, Impaired balance, Decreased mobility, Decreased coordination, Decreased safety awareness, Decreased skin integrity, Orthopedic restrictions, Pain  Assessment: Pt is 70 y o  male seen for PT evaluation on 11/8/2018 s/p admit to [facility] on 11/7/2018 w/ Pneumonia  Pt presents with increased SOB   POD 2 s/p L shoulder replacement, pt was d/c'ed to STR yesterday from [facility]  PT consulted to assess pt's functional mobility and d/c needs  Order placed for PT eval and tx, w/ up w/ A order  Performed at least 2 patient identifiers during session: Name and wristband  Comorbidities affecting pt's physical performance at time of assessment include: anxiety, arthritis, A fib, CAD, cardiac disease, cervicalgia, chronic diastolic CHF, chronic pain disorder, COPD, HLD, HTN, MI, neuropathy, SOB, sleep apnea  PTA, pt was resident of [address] [place] [address], ambulatory without AD community distances, I with ADLs  Pt reporting his plan is to return to [address] [place] [address] for STR, but move out to 1st floor apt set up when done with rehab  Personal factors affecting pt at time of IE include: inability to navigate community distances, inability to navigate level surfaces w/o external assistance and positive fall history   Please find objective findings from PT assessment regarding body systems outlined above with impairments and limitations including weakness, decreased ROM, impaired balance, decreased endurance, impaired coordination, gait deviations, decreased activity tolerance, altered sensation, decreased safety awareness, fall risk, orthopedic restrictions and SOB upon exertion, as well as mobility assessment (need for min assist w/ all phases of mobility when usually ambulating independently and need for cueing for mobility technique)  The following objective measures performed on IE also reveal limitations: Barthel Index: 50/100 and Modified [place]: 4 (moderate/severe disability)  Pt's clinical presentation is currently unstable/unpredictable seen in pt's presentation of need for input for task focus and mobility technique, need for min assist w/ all phases of mobility when usually mobilizing independently and ongoing medical assessment  Pt to benefit from continued PT tx to address deficits as defined above and maximize level of functional independent mobility and consistency  From PT/mobility standpoint, recommendation at time of d/c would be STR pending progress in order to facilitate return to PLOF  Barriers to Discharge: None     Recommendation: Short-term skilled PT     PT - OK to Discharge: Yes (when medically cleared if to STR)    See flowsheet documentation for full assessment
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
## 2018-11-08 NOTE — PROGRESS NOTES
[facility] Progress Note  Patient: Charmaine Gonzalez 70 y o  male   MRN: [id number]  PCP: Stacey Calixto MD  Unit/Bed#: -01 Encounter: [id number]  Date Of Visit: 18    Assessment:    Principal Problem:    Pneumonia  Active Problems:    Paroxysmal atrial fibrillation (HCC)    S/P reverse total shoulder arthroplasty, left    SOB (shortness of breath)      Plan:    · 1  HCAP- possibly secondary to gram negative organisms- on vancomycin and cefepime  · 2  History of recent left shoulder arthroplasty- POD#2  · 3  Afib- on eliquis  · 4  Chronic combined systolic and diastolic failure in the setting of CAD a/e/b continued SOB (exacerbated by current PNA dx) requiring monitoring, and continued treatment with imdur and demadex   ·        VTE Pharmacologic Prophylaxis:   Pharmacologic: Apixaban (Eliquis)  Mechanical VTE Prophylaxis in Place: Yes    Patient Centered Rounds: I have performed bedside rounds with nursing staff today  Discussions with Specialists or Other Care Team Provider:     Education and Discussions with Family / Patient:     Time Spent for Care: 20 minutes  More than 50% of total time spent on counseling and coordination of care as described above  Current Length of Stay: 1 day(s)    Current Patient Status: Inpatient   Certification Statement: The patient will continue to require additional inpatient hospital stay due to pneumonia    Discharge Plan / Estimated Discharge Date: once pneumonia improves    Code Status: Level 1 - Full Code      Subjective:   Patient seen and examined at bedside  Patient has no new complaints  Objective:     Vitals:   Temp (24hrs), Av 8 °F (37 7 °C), Min:98 3 °F (36 8 °C), Max:102 7 °F (39 3 °C)    Temp:  [98 3 °F (36 8 °C)-102 7 °F (39 3 °C)] 98 3 °F (36 8 °C)  HR:  [77-84] 84  Resp:  [18-22] 18  BP: (105-123)/(54-71) 111/71  SpO2:  [92 %-97 %] 97 %  Body mass index is 26 84 kg/m²       Input and Output Summary (last 24 hours): Intake/Output Summary (Last 24 hours) at 11/08/18 1113  Last data filed at 11/08/18 8520   Gross per 24 hour   Intake             1790 ml   Output             1191 ml   Net              599 ml       Physical Exam:     Physical Exam   Constitutional: He is oriented to person, place, and time  He appears well-developed and well-nourished  HENT:   Head: Normocephalic and atraumatic  Eyes: Pupils are equal, round, and reactive to light  Conjunctivae and EOM are normal    Neck: Normal range of motion  Neck supple  No tracheal deviation present  No thyromegaly present  Cardiovascular: Normal rate, regular rhythm and normal heart sounds  Exam reveals no gallop and no friction rub  No murmur heard  Pulmonary/Chest: Effort normal and breath sounds normal  No respiratory distress  He has no wheezes  He has no rales  Abdominal: Soft  Bowel sounds are normal  He exhibits no distension  There is no tenderness  There is no rebound  Musculoskeletal: Normal range of motion  He exhibits no edema  Left shoulder sling   Neurological: He is alert and oriented to person, place, and time  Vitals reviewed  Additional Data:     Labs:      Results from last 7 days  Lab Units 11/08/18  0447   WBC Thousand/uL 7 41   HEMOGLOBIN g/dL 9 1*   HEMATOCRIT % 27 6*   PLATELETS Thousands/uL 95*   NEUTROS PCT % 80*   LYMPHS PCT % 9*   MONOS PCT % 11   EOS PCT % 0       Results from last 7 days  Lab Units 11/08/18  0447 11/07/18  2157   POTASSIUM mmol/L 3 9 3 9   CHLORIDE mmol/L 101 97*   CO2 mmol/L 29 32   BUN mg/dL 17 20   CREATININE mg/dL 0 89 1 03   CALCIUM mg/dL 8 2* 8 8   ALK PHOS U/L  --  108   ALT U/L  --  13   AST U/L  --  18       Results from last 7 days  Lab Units 11/07/18  2157   INR  1 29*       * I Have Reviewed All Lab Data Listed Above  * Additional Pertinent Lab Tests Reviewed:  All Labs For Current Hospital Admission Reviewed    Imaging:    Imaging Reports Reviewed Today Include:   Imaging Personally Reviewed by Myself Includes:      Recent Cultures (last 7 days):           Last 24 Hours Medication List:     Current Facility-Administered Medications:  acetaminophen 325 mg Oral Q6H PRN Malik Fix, MD    acetaminophen 650 mg Oral Q6H PRN Birdena Fix, MD    apixaban 2 5 mg Oral BID Birdena Fix, MD    aspirin 81 mg Oral Daily Birdena Fix, MD    atorvastatin 40 mg Oral Daily Birdena Fix, MD    budesonide-formoterol 2 puff Inhalation BID Birdena Fix, MD    carBAMazepine 100 mg Oral BID Birdena Fix, MD    cefepime 1,000 mg Intravenous Q12H Birdena Fix, MD    docusate sodium 100 mg Oral BID Birdena Fix, MD    DULoxetine 60 mg Oral Daily Birdena Fix, MD    gabapentin 300 mg Oral TID Birdena Fix, MD    ibuprofen 400 mg Oral Q6H PRN Holden Hercules MD    isosorbide mononitrate 30 mg Oral Daily Birdena Fix, MD    loratadine 10 mg Oral Daily Birdena Fix, MD    melatonin 3 mg Oral HS Birdena Fix, MD    ondansetron 4 mg Intravenous Q6H PRN Birdena Fix, MD    oxyCODONE 5 mg Oral Q4H PRN Birdena Fix, MD    QUEtiapine 25 mg Oral HS Birdena Fix, MD    sodium chloride 100 mL/hr Intravenous Continuous Malik Fix, MD Last Rate: 100 mL/hr (11/08/18 0133)   spironolactone 25 mg Oral Daily Birdena Fix, MD    tiotropium 18 mcg Inhalation Daily Birdena Fix, MD    torsemide 40 mg Oral Daily Birdena Fix, MD    traZODone 50 mg Oral HS Birdena Fix, MD    vancomycin 15 mg/kg Intravenous Q12H Birdena Fix, MD         Today, Patient Was Seen By: Holden Hercules MD    ** Please Note: This note has been constructed using a voice recognition system   **
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
## 2018-11-08 NOTE — PLAN OF CARE
DISCHARGE PLANNING     Discharge to home or other facility with appropriate resources Progressing        INFECTION - ADULT     Absence or prevention of progression during hospitalization Progressing     Absence of fever/infection during neutropenic period Progressing        Knowledge Deficit     Patient/family/caregiver demonstrates understanding of disease process, treatment plan, medications, and discharge instructions Progressing        MUSCULOSKELETAL - ADULT     Maintain or return mobility to safest level of function Progressing     Maintain proper alignment of affected body part Progressing        PAIN - ADULT     Verbalizes/displays adequate comfort level or baseline comfort level Progressing        Potential for Falls     Patient will remain free of falls Progressing        RESPIRATORY - ADULT     Achieves optimal ventilation and oxygenation Progressing        SAFETY ADULT     Maintain or return to baseline ADL function Progressing     Maintain or return mobility status to optimal level Progressing        SKIN/TISSUE INTEGRITY - ADULT     Skin integrity remains intact Progressing     Incision(s), wounds(s) or drain site(s) healing without S/S of infection Progressing

## 2018-11-08 NOTE — UTILIZATION REVIEW
Initial Clinical Review    Admission: Date/Time/Statement: 11/7/18 @ 2352     Orders Placed This Encounter   Procedures    Inpatient Admission (expected length of stay for this patient is greater than two midnights)     Standing Status:   Standing     Number of Occurrences:   1     Order Specific Question:   Admitting Physician     Answer:   Jocelynn Freeman [[id number]]     Order Specific Question:   Level of Care     Answer:   Med Surg [16]     Order Specific Question:   Estimated length of stay     Answer:   More than 2 Midnights     Order Specific Question:   Certification     Answer:   I certify that inpatient services are medically necessary for this patient for a duration of greater than two midnights  See H&P and MD Progress Notes for additional information about the patient's course of treatment  ED: Date/Time/Mode of Arrival:   ED Arrival Information     Expected Arrival Acuity Means of Arrival Escorted By Service Admission Type    - 11/7/2018 21:04 Urgent Ambulance [facility] General Medicine Urgent    Arrival Complaint    sob          Chief Complaint:   Chief Complaint   Patient presents with    Shortness of Breath     Pt presents via EMS from [address] [place] [address] after being at [facility] for left shoulder surgery  extreme SOB when walking       History of Illness: 71 yo male to ED Today while at skilled facility he reports that shortness of breath became worse, he was unable to ambulate more than 50 feet without becoming fatigued and short of breath  He reports subjective fevers and chills  In the emergency department he was found to have a temp of 102 7°, CT of the chest revealed evidence of left-sided pneumonia  He was given IV vancomycin and cefepime in the ED and is being admitted for further treatment      ED Vital Signs:   ED Triage Vitals   Temperature Pulse Respirations Blood Pressure SpO2   11/07/18 2114 11/07/18 2114 11/07/18 2114 11/07/18 2114 11/07/18 2114   (!) 102 7 °F (39 3 °C) 77 18 105/54 92 %      Temp Source Heart Rate Source Patient Position - Orthostatic VS BP Location FiO2 (%)   11/07/18 2114 11/07/18 2114 11/07/18 2114 11/07/18 2114 --   Oral Monitor Lying Right arm       Pain Score       11/08/18 0100       3        Wt Readings from Last 1 Encounters:   11/08/18 97 4 kg (214 lb 11 7 oz)       Vital Signs (abnormal): wnl     Abnormal Labs/Diagnostic Test Results: na 135, cl 97, alb 3 1, pt inr 15 9  1 29, H&H 10 0 30 4, plt   124  CT chest - No evidence of pulmonary embolism is seen   The main pulmonary artery measures approximately 3 5 cm diameter, similar to the prior study   Clinical correlation regarding the possibility of pulmonary artery hypertension is suggested  There is a moderate-sized area of a combination of atelectasis and consolidation containing air bronchograms within the inferior aspect of the left lower lobe of the lung   Pneumonia should be excluded clinically   Follow-up to ensure complete resolution   is recommended  Frank Pale is a small left pleural effusion  The left pectoralis muscles are thicker than the contralateral right side and appear somewhat indistinct   This appears new compared to the prior study and may possibly be related to the history of recent left shoulder replacement surgery   Clinical   correlation is recommended    EKG- a-fib     ED Treatment:   Medication Administration from 11/07/2018 2104 to 11/08/2018 0106       Date/Time Order Dose Route Action Action by Comments     11/08/2018 0023 sodium chloride 0 9 % bolus 1,000 mL 0 mL Intravenous Stopped Stormy Wilson RN      11/07/2018 2203 sodium chloride 0 9 % bolus 1,000 mL 1,000 mL Intravenous New Bag Maria Esther Grimm RN      11/07/2018 2222 acetaminophen (TYLENOL) tablet 650 mg 650 mg Oral Given Maria Estherluis felipe Grimm RN      11/07/2018 2222 ondansetron (ZOFRAN) injection 4 mg 4 mg Intravenous Given Maria Esther Grimm RN      11/07/2018 2247 iohexol (OMNIPAQUE) 350 MG/ML injection (MULTI-DOSE) 85 mL 85 mL Intravenous Given Doron Castroer      11/08/2018 0027 vancomycin (VANCOCIN) 1,250 mg in sodium chloride 0 9 % 250 mL IVPB 1,250 mg Intravenous [address] Stormy Wilson RN      11/08/2018 0023 cefepime (MAXIPIME) 2,000 mg in dextrose 5 % 50 mL IVPB 0 mg Intravenous Stopped Stormy Wilson RN      11/07/2018 2349 cefepime (MAXIPIME) 2,000 mg in dextrose 5 % 50 mL IVPB 2,000 mg Intravenous New Bag Stormy Wilson RN           Past Medical/Surgical History:    Active Ambulatory Problems     Diagnosis Date Noted    Chronic atrial fibrillation ([facility] [address] ) 08/18/2016    Hypertension, essential, benign 08/18/2016    Chronic combined systolic and diastolic [facility]congestive [facility]) [facility]     Paroxysmal atrial fibrillation ([facility] [address] )     Coronary artery disease involving native coronary artery     [facility]ch[facility]     ASCVD (arteriosclerotic cardiovascular disease)     Olecranon bursitis of right elbow 02/02/2018    Anxiety 08/03/2015    Cervical spondylosis with myelopathy 06/16/2015    Chronic pain disorder 11/07/2016    Depression 05/14/2012    Dyslipidemia 03/17/2015    Gait instability 10/20/2016    Insomnia 01/29/2016    Pressure ulcer of left elbow 11/13/2017    Screening for colon cancer 06/05/2018    Arthritis of left shoulder region 07/12/2018    Left shoulder pain 07/12/2018    Primary osteoarthritis of left shoulder 08/10/2018    S/P reverse total shoulder arthroplasty, left 11/07/2018     Past Medical History:   Diagnosis Date    Anxiety     Arthritis     Atrial fibrillation ([facility])     CAD (coronary artery disease)     Cardiac disease     Cervicalgia     Chronic diastolic CHF (congestive heart failure) ([facility])     Chronic pain     Chronic pain disorder     COPD (chronic obstructive pulmonary disease) ([facility])     Hyperlipidemia     Hypertension     Myocardial infarction ([facility] [address] ) 1982, 1983, 1988    Neuropathy     Shortness of breath     Sleep apnea Admitting Diagnosis: Pneumonia [J18 9]  SOB (shortness of breath) [R06 02]  Pleural effusion [J90]    Age/Sex: 70 y o  male    Assessment/Plan: 71 yo male admitted w/ pneumonia s/p L shoulder replacement  Started on vanc and cefepime in ED and will cont   Check sputum and cx   SOB w/ hypoxia  Give O2 , abx, inc spirom   A-fib on eliquis , cont  CHF cont torsemide and spirolactone   COPD bronchodilators  Admission Orders:  Scheduled Meds:   Current Facility-Administered Medications:  acetaminophen 325 mg Oral Q6H PRN     acetaminophen 650 mg Oral Q6H PRN     apixaban 2 5 mg Oral BID     aspirin 81 mg Oral Daily     atorvastatin 40 mg Oral Daily     budesonide-formoterol 2 puff Inhalation BID     carBAMazepine 100 mg Oral BID     cefepime 1,000 mg Intravenous Q12H  Last Rate: 1,000 mg (11/08/18 1209)   docusate sodium 100 mg Oral BID     DULoxetine 60 mg Oral Daily     gabapentin 300 mg Oral TID     ibuprofen 400 mg Oral Q6H PRN     isosorbide mononitrate 30 mg Oral Daily     loratadine 10 mg Oral Daily     melatonin 3 mg Oral HS     ondansetron 4 mg Intravenous Q6H PRN     oxyCODONE 5 mg Oral Q4H PRN     QUEtiapine 25 mg Oral HS     sodium chloride 100 mL/hr Intravenous Continuous  Last Rate: 100 mL/hr (11/08/18 0133)   spironolactone 25 mg Oral Daily     tiotropium 18 mcg Inhalation Daily     torsemide 40 mg Oral Daily     traZODone 50 mg Oral HS     vancomycin 15 mg/kg Intravenous Q12H  Last Rate: 1,500 mg (11/08/18 1246)       OT PT eval   Cardiac diet   SCD  Up w/ assist   Bladder scan   11/8 bmp, cbc , vanco trough   Na  134, hue  8 2, H&H  9 1  27 6, plt   95    IM note  11/8   · 1  HCAP- possibly secondary to gram negative organisms- on vancomycin and cefepime  · 2  History of recent left shoulder arthroplasty- POD#2  · 3  Afib- on eliquis  · 4  Chronic systolic and diastolic HF- stable    On aldactone, demadex   Certification Statement: The patient will continue to require additional inpatient hospital stay due to pneumonia

## 2018-11-09 LAB
ANION GAP SERPL CALCULATED.3IONS-SCNC: 4 MMOL/L (ref 4–13)
BASOPHILS # BLD AUTO: 0.02 THOUSANDS/ΜL (ref 0–0.1)
BASOPHILS NFR BLD AUTO: 0 % (ref 0–1)
BUN SERPL-MCNC: 13 MG/DL (ref 5–25)
CALCIUM SERPL-MCNC: 7.9 MG/DL (ref 8.3–10.1)
CHLORIDE SERPL-SCNC: 98 MMOL/L (ref 100–108)
CO2 SERPL-SCNC: 30 MMOL/L (ref 21–32)
CREAT SERPL-MCNC: 0.9 MG/DL (ref 0.6–1.3)
EOSINOPHIL # BLD AUTO: 0.05 THOUSAND/ΜL (ref 0–0.61)
EOSINOPHIL NFR BLD AUTO: 1 % (ref 0–6)
ERYTHROCYTE [DISTWIDTH] IN BLOOD BY AUTOMATED COUNT: 13.2 % (ref 11.6–15.1)
GFR SERPL CREATININE-BSD FRML MDRD: 87 ML/MIN/1.73SQ M
GLUCOSE SERPL-MCNC: 102 MG/DL (ref 65–140)
HCT VFR BLD AUTO: 24.9 % (ref 36.5–49.3)
HGB BLD-MCNC: 8.2 G/DL (ref 12–17)
IMM GRANULOCYTES # BLD AUTO: 0.05 THOUSAND/UL (ref 0–0.2)
IMM GRANULOCYTES NFR BLD AUTO: 1 % (ref 0–2)
LYMPHOCYTES # BLD AUTO: 0.66 THOUSANDS/ΜL (ref 0.6–4.47)
LYMPHOCYTES NFR BLD AUTO: 10 % (ref 14–44)
MCH RBC QN AUTO: 33.9 PG (ref 26.8–34.3)
MCHC RBC AUTO-ENTMCNC: 32.9 G/DL (ref 31.4–37.4)
MCV RBC AUTO: 103 FL (ref 82–98)
MONOCYTES # BLD AUTO: 0.61 THOUSAND/ΜL (ref 0.17–1.22)
MONOCYTES NFR BLD AUTO: 9 % (ref 4–12)
NEUTROPHILS # BLD AUTO: 5.13 THOUSANDS/ΜL (ref 1.85–7.62)
NEUTS SEG NFR BLD AUTO: 79 % (ref 43–75)
NRBC BLD AUTO-RTO: 0 /100 WBCS
PLATELET # BLD AUTO: 97 THOUSANDS/UL (ref 149–390)
PMV BLD AUTO: 9.6 FL (ref 8.9–12.7)
POTASSIUM SERPL-SCNC: 3.5 MMOL/L (ref 3.5–5.3)
RBC # BLD AUTO: 2.42 MILLION/UL (ref 3.88–5.62)
SODIUM SERPL-SCNC: 132 MMOL/L (ref 136–145)
VANCOMYCIN TROUGH SERPL-MCNC: 15.3 UG/ML (ref 10–20)
VANCOMYCIN TROUGH SERPL-MCNC: 16.9 UG/ML (ref 10–20)
WBC # BLD AUTO: 6.52 THOUSAND/UL (ref 4.31–10.16)

## 2018-11-09 PROCEDURE — 80202 ASSAY OF VANCOMYCIN: CPT | Performed by: HOSPITALIST

## 2018-11-09 PROCEDURE — 80048 BASIC METABOLIC PNL TOTAL CA: CPT | Performed by: INTERNAL MEDICINE

## 2018-11-09 PROCEDURE — 99232 SBSQ HOSP IP/OBS MODERATE 35: CPT | Performed by: INTERNAL MEDICINE

## 2018-11-09 PROCEDURE — 85025 COMPLETE CBC W/AUTO DIFF WBC: CPT | Performed by: INTERNAL MEDICINE

## 2018-11-09 PROCEDURE — 80202 ASSAY OF VANCOMYCIN: CPT | Performed by: INTERNAL MEDICINE

## 2018-11-09 RX ORDER — CALCIUM CARBONATE 200(500)MG
500 TABLET,CHEWABLE ORAL 3 TIMES DAILY PRN
Status: DISCONTINUED | OUTPATIENT
Start: 2018-11-09 | End: 2018-11-13 | Stop reason: HOSPADM

## 2018-11-09 RX ADMIN — VANCOMYCIN HYDROCHLORIDE 1500 MG: 1 INJECTION, POWDER, LYOPHILIZED, FOR SOLUTION INTRAVENOUS at 23:57

## 2018-11-09 RX ADMIN — ASPIRIN 81 MG 81 MG: 81 TABLET ORAL at 09:08

## 2018-11-09 RX ADMIN — CARBAMAZEPINE 100 MG: 100 TABLET, CHEWABLE ORAL at 09:08

## 2018-11-09 RX ADMIN — ISOSORBIDE MONONITRATE 30 MG: 30 TABLET, EXTENDED RELEASE ORAL at 09:08

## 2018-11-09 RX ADMIN — DOCUSATE SODIUM 100 MG: 100 CAPSULE, LIQUID FILLED ORAL at 09:08

## 2018-11-09 RX ADMIN — OXYCODONE HYDROCHLORIDE 5 MG: 5 TABLET ORAL at 22:51

## 2018-11-09 RX ADMIN — MELATONIN 3 MG: at 22:43

## 2018-11-09 RX ADMIN — GABAPENTIN 300 MG: 300 CAPSULE ORAL at 22:43

## 2018-11-09 RX ADMIN — TORSEMIDE 40 MG: 20 TABLET ORAL at 09:07

## 2018-11-09 RX ADMIN — ONDANSETRON HYDROCHLORIDE 4 MG: 2 SOLUTION INTRAMUSCULAR; INTRAVENOUS at 18:29

## 2018-11-09 RX ADMIN — DULOXETINE HYDROCHLORIDE 60 MG: 60 CAPSULE, DELAYED RELEASE ORAL at 09:07

## 2018-11-09 RX ADMIN — CARBAMAZEPINE 100 MG: 100 TABLET, CHEWABLE ORAL at 17:13

## 2018-11-09 RX ADMIN — VANCOMYCIN HYDROCHLORIDE 1500 MG: 1 INJECTION, POWDER, LYOPHILIZED, FOR SOLUTION INTRAVENOUS at 12:18

## 2018-11-09 RX ADMIN — ANTACID TABLETS 500 MG: 500 TABLET, CHEWABLE ORAL at 22:43

## 2018-11-09 RX ADMIN — QUETIAPINE 25 MG: 25 TABLET ORAL at 22:43

## 2018-11-09 RX ADMIN — TRAZODONE HYDROCHLORIDE 50 MG: 50 TABLET ORAL at 22:43

## 2018-11-09 RX ADMIN — GABAPENTIN 300 MG: 300 CAPSULE ORAL at 09:08

## 2018-11-09 RX ADMIN — CEFEPIME HYDROCHLORIDE 1000 MG: 1 INJECTION, POWDER, FOR SOLUTION INTRAMUSCULAR; INTRAVENOUS at 10:59

## 2018-11-09 RX ADMIN — SPIRONOLACTONE 25 MG: 25 TABLET ORAL at 09:13

## 2018-11-09 RX ADMIN — VANCOMYCIN HYDROCHLORIDE 1500 MG: 1 INJECTION, POWDER, LYOPHILIZED, FOR SOLUTION INTRAVENOUS at 00:30

## 2018-11-09 RX ADMIN — BUDESONIDE AND FORMOTEROL FUMARATE DIHYDRATE 2 PUFF: 160; 4.5 AEROSOL RESPIRATORY (INHALATION) at 09:06

## 2018-11-09 RX ADMIN — TIOTROPIUM BROMIDE 18 MCG: 18 CAPSULE ORAL; RESPIRATORY (INHALATION) at 09:05

## 2018-11-09 RX ADMIN — ATORVASTATIN CALCIUM 40 MG: 40 TABLET, FILM COATED ORAL at 09:08

## 2018-11-09 RX ADMIN — LORATADINE 10 MG: 10 TABLET ORAL at 09:07

## 2018-11-09 RX ADMIN — CEFEPIME HYDROCHLORIDE 1000 MG: 1 INJECTION, POWDER, FOR SOLUTION INTRAMUSCULAR; INTRAVENOUS at 22:43

## 2018-11-09 RX ADMIN — BUDESONIDE AND FORMOTEROL FUMARATE DIHYDRATE 2 PUFF: 160; 4.5 AEROSOL RESPIRATORY (INHALATION) at 17:13

## 2018-11-09 RX ADMIN — APIXABAN 2.5 MG: 2.5 TABLET, FILM COATED ORAL at 17:13

## 2018-11-09 RX ADMIN — GABAPENTIN 300 MG: 300 CAPSULE ORAL at 17:13

## 2018-11-09 RX ADMIN — APIXABAN 2.5 MG: 2.5 TABLET, FILM COATED ORAL at 09:09

## 2018-11-09 NOTE — PROGRESS NOTES
[facility]  Patient: Arti Hussein 70 y o  male   MRN: [id number]  PCP: Judge Chang MD  Unit/Bed#: -01 Encounter: [id number]  Date Of Visit: 18    Assessment:    Principal Problem:    Pneumonia  Active Problems:    Paroxysmal atrial fibrillation (HCC)    S/P reverse total shoulder arthroplasty, left    SOB (shortness of breath)      Plan:    · 1  HCAP- possibly secondary to gram negative organisms- on vancomycin and cefepime  SOB improving  · 2  History of recent left shoulder arthroplasty 18- stable  · 3  Afib- on eliquis  · 4  Chronic combined systolic and diastolic failure in the setting of CAD a/e/b continued SOB (exacerbated by current PNA dx) requiring monitoring, and continued treatment with imdur and demadex   ·        VTE Pharmacologic Prophylaxis:   Pharmacologic: Apixaban (Eliquis)  Mechanical VTE Prophylaxis in Place: Yes    Patient Centered Rounds: I have performed bedside rounds with nursing staff today  Discussions with Specialists or Other Care Team Provider:     Education and Discussions with Family / Patient:     Time Spent for Care: 20 minutes  More than 50% of total time spent on counseling and coordination of care as described above  Current Length of Stay: 2 day(s)    Current Patient Status: Inpatient   Certification Statement: The patient will continue to require additional inpatient hospital stay due to pneumonia    Discharge Plan / Estimated Discharge Date: once pneumonia improves    Code Status: Level 1 - Full Code      Subjective:   Patient seen and examined at bedside  Patient has no new complaints  Objective:     Vitals:   Temp (24hrs), Av 6 °F (37 6 °C), Min:98 7 °F (37 1 °C), Max:100 4 °F (38 °C)    Temp:  [98 7 °F (37 1 °C)-100 4 °F (38 °C)] 100 4 °F (38 °C)  HR:  [70-89] 84  Resp:  [18] 18  BP: (101-124)/(57-67) 104/57  SpO2:  [95 %-97 %] 96 %  Body mass index is 26 84 kg/m²       Input and Output Summary (last 24 hours): Intake/Output Summary (Last 24 hours) at 11/09/18 1037  Last data filed at 11/08/18 1744   Gross per 24 hour   Intake                0 ml   Output              850 ml   Net             -850 ml       Physical Exam:     Physical Exam   Constitutional: He is oriented to person, place, and time  He appears well-developed and well-nourished  HENT:   Head: Normocephalic and atraumatic  Eyes: Pupils are equal, round, and reactive to light  Conjunctivae and EOM are normal    Neck: Normal range of motion  Neck supple  No tracheal deviation present  No thyromegaly present  Cardiovascular: Normal rate, regular rhythm and normal heart sounds  Exam reveals no gallop and no friction rub  No murmur heard  Pulmonary/Chest: Effort normal and breath sounds normal  No respiratory distress  He has no wheezes  He has no rales  Abdominal: Soft  Bowel sounds are normal  He exhibits no distension  There is no tenderness  There is no rebound  Musculoskeletal: Normal range of motion  He exhibits no edema  Left shoulder sling   Neurological: He is alert and oriented to person, place, and time  Vitals reviewed  Additional Data:     Labs:      Results from last 7 days  Lab Units 11/09/18  0431   WBC Thousand/uL 6 52   HEMOGLOBIN g/dL 8 2*   HEMATOCRIT % 24 9*   PLATELETS Thousands/uL 97*   NEUTROS PCT % 79*   LYMPHS PCT % 10*   MONOS PCT % 9   EOS PCT % 1       Results from last 7 days  Lab Units 11/09/18  0431  11/07/18  2157   POTASSIUM mmol/L 3 5  < > 3 9   CHLORIDE mmol/L 98*  < > 97*   CO2 mmol/L 30  < > 32   BUN mg/dL 13  < > 20   CREATININE mg/dL 0 90  < > 1 03   CALCIUM mg/dL 7 9*  < > 8 8   ALK PHOS U/L  --   --  108   ALT U/L  --   --  13   AST U/L  --   --  18   < > = values in this interval not displayed  Results from last 7 days  Lab Units 11/07/18  2157   INR  1 29*       * I Have Reviewed All Lab Data Listed Above  * Additional Pertinent Lab Tests Reviewed:  All Labs For Current Hospital Admission Reviewed    Imaging:    Imaging Reports Reviewed Today Include:   Imaging Personally Reviewed by Myself Includes:      Recent Cultures (last 7 days):       Results from last 7 days  Lab Units 11/07/18 2216 11/07/18 2157   BLOOD CULTURE  No Growth at 24 hrs  No Growth at 24 hrs  Last 24 Hours Medication List:     Current Facility-Administered Medications:  acetaminophen 325 mg Oral Q6H PRN Peggy Warner MD    acetaminophen 650 mg Oral Q6H PRN Peggy Warner MD    apixaban 2 5 mg Oral BID Peggy Warner MD    aspirin 81 mg Oral Daily Peggy Warner MD    atorvastatin 40 mg Oral Daily Peggy Warner MD    budesonide-formoterol 2 puff Inhalation BID Peggy Warner MD    calcium carbonate 500 mg Oral TID PRN Peggy Warner MD    carBAMazepine 100 mg Oral BID Peggy Warner MD    cefepime 1,000 mg Intravenous Q12H Peggy Warner MD Last Rate: 1,000 mg (11/08/18 2323)   docusate sodium 100 mg Oral BID Peggy Warner MD    DULoxetine 60 mg Oral Daily Peggy Warner MD    gabapentin 300 mg Oral TID Peggy Warner MD    ibuprofen 400 mg Oral Q6H PRN Mc Barros MD    isosorbide mononitrate 30 mg Oral Daily Peggy Warner MD    loratadine 10 mg Oral Daily Peggy Warner MD    melatonin 3 mg Oral HS Peggy Warner MD    ondansetron 4 mg Intravenous Q6H PRN Peggy Warner MD    oxyCODONE 5 mg Oral Q4H PRN Peggy Warner MD    QUEtiapine 25 mg Oral HS Peggy Warner MD    spironolactone 25 mg Oral Daily Peggy Warner MD    tiotropium 18 mcg Inhalation Daily Peggy Warner MD    torsemide 40 mg Oral Daily Peggy Warner MD    traZODone 50 mg Oral HS Peggy Warner MD    vancomycin 15 mg/kg Intravenous Q12H Peggy Warner MD Last Rate: 1,500 mg (11/09/18 0030)        Today, Patient Was Seen By: Mc Barros MD    ** Please Note: This note has been constructed using a voice recognition system   **

## 2018-11-10 LAB
ANION GAP SERPL CALCULATED.3IONS-SCNC: 8 MMOL/L (ref 4–13)
BUN SERPL-MCNC: 14 MG/DL (ref 5–25)
CALCIUM SERPL-MCNC: 7.9 MG/DL (ref 8.3–10.1)
CHLORIDE SERPL-SCNC: 99 MMOL/L (ref 100–108)
CO2 SERPL-SCNC: 28 MMOL/L (ref 21–32)
CREAT SERPL-MCNC: 0.84 MG/DL (ref 0.6–1.3)
GFR SERPL CREATININE-BSD FRML MDRD: 89 ML/MIN/1.73SQ M
GLUCOSE SERPL-MCNC: 101 MG/DL (ref 65–140)
POTASSIUM SERPL-SCNC: 3.3 MMOL/L (ref 3.5–5.3)
SODIUM SERPL-SCNC: 135 MMOL/L (ref 136–145)

## 2018-11-10 PROCEDURE — 80048 BASIC METABOLIC PNL TOTAL CA: CPT | Performed by: INTERNAL MEDICINE

## 2018-11-10 PROCEDURE — 97110 THERAPEUTIC EXERCISES: CPT

## 2018-11-10 PROCEDURE — 97167 OT EVAL HIGH COMPLEX 60 MIN: CPT

## 2018-11-10 PROCEDURE — 99232 SBSQ HOSP IP/OBS MODERATE 35: CPT | Performed by: INTERNAL MEDICINE

## 2018-11-10 PROCEDURE — 97116 GAIT TRAINING THERAPY: CPT

## 2018-11-10 PROCEDURE — G8987 SELF CARE CURRENT STATUS: HCPCS

## 2018-11-10 PROCEDURE — 94760 N-INVAS EAR/PLS OXIMETRY 1: CPT

## 2018-11-10 PROCEDURE — 94640 AIRWAY INHALATION TREATMENT: CPT

## 2018-11-10 PROCEDURE — G8988 SELF CARE GOAL STATUS: HCPCS

## 2018-11-10 RX ORDER — IPRATROPIUM BROMIDE AND ALBUTEROL SULFATE 2.5; .5 MG/3ML; MG/3ML
3 SOLUTION RESPIRATORY (INHALATION)
Status: DISCONTINUED | OUTPATIENT
Start: 2018-11-10 | End: 2018-11-11

## 2018-11-10 RX ORDER — METHYLPREDNISOLONE SODIUM SUCCINATE 40 MG/ML
40 INJECTION, POWDER, LYOPHILIZED, FOR SOLUTION INTRAMUSCULAR; INTRAVENOUS EVERY 8 HOURS SCHEDULED
Status: DISCONTINUED | OUTPATIENT
Start: 2018-11-10 | End: 2018-11-13 | Stop reason: HOSPADM

## 2018-11-10 RX ORDER — POTASSIUM CHLORIDE 20 MEQ/1
40 TABLET, EXTENDED RELEASE ORAL ONCE
Status: COMPLETED | OUTPATIENT
Start: 2018-11-10 | End: 2018-11-10

## 2018-11-10 RX ADMIN — TRAZODONE HYDROCHLORIDE 50 MG: 50 TABLET ORAL at 23:04

## 2018-11-10 RX ADMIN — CEFEPIME HYDROCHLORIDE 1000 MG: 1 INJECTION, POWDER, FOR SOLUTION INTRAMUSCULAR; INTRAVENOUS at 23:05

## 2018-11-10 RX ADMIN — BUDESONIDE AND FORMOTEROL FUMARATE DIHYDRATE 2 PUFF: 160; 4.5 AEROSOL RESPIRATORY (INHALATION) at 17:37

## 2018-11-10 RX ADMIN — QUETIAPINE 25 MG: 25 TABLET ORAL at 23:04

## 2018-11-10 RX ADMIN — DULOXETINE HYDROCHLORIDE 60 MG: 60 CAPSULE, DELAYED RELEASE ORAL at 09:35

## 2018-11-10 RX ADMIN — TORSEMIDE 40 MG: 20 TABLET ORAL at 09:34

## 2018-11-10 RX ADMIN — VANCOMYCIN HYDROCHLORIDE 1500 MG: 1 INJECTION, POWDER, LYOPHILIZED, FOR SOLUTION INTRAVENOUS at 12:59

## 2018-11-10 RX ADMIN — TIOTROPIUM BROMIDE 18 MCG: 18 CAPSULE ORAL; RESPIRATORY (INHALATION) at 09:45

## 2018-11-10 RX ADMIN — APIXABAN 2.5 MG: 2.5 TABLET, FILM COATED ORAL at 09:35

## 2018-11-10 RX ADMIN — METHYLPREDNISOLONE SODIUM SUCCINATE 40 MG: 40 INJECTION, POWDER, FOR SOLUTION INTRAMUSCULAR; INTRAVENOUS at 14:49

## 2018-11-10 RX ADMIN — GABAPENTIN 300 MG: 300 CAPSULE ORAL at 09:35

## 2018-11-10 RX ADMIN — CARBAMAZEPINE 100 MG: 100 TABLET, CHEWABLE ORAL at 17:36

## 2018-11-10 RX ADMIN — METHYLPREDNISOLONE SODIUM SUCCINATE 40 MG: 40 INJECTION, POWDER, FOR SOLUTION INTRAMUSCULAR; INTRAVENOUS at 23:05

## 2018-11-10 RX ADMIN — OXYCODONE HYDROCHLORIDE 5 MG: 5 TABLET ORAL at 04:15

## 2018-11-10 RX ADMIN — POTASSIUM CHLORIDE 40 MEQ: 1500 TABLET, EXTENDED RELEASE ORAL at 10:41

## 2018-11-10 RX ADMIN — ATORVASTATIN CALCIUM 40 MG: 40 TABLET, FILM COATED ORAL at 09:35

## 2018-11-10 RX ADMIN — OXYCODONE HYDROCHLORIDE 5 MG: 5 TABLET ORAL at 23:05

## 2018-11-10 RX ADMIN — MELATONIN 3 MG: at 23:04

## 2018-11-10 RX ADMIN — BUDESONIDE AND FORMOTEROL FUMARATE DIHYDRATE 2 PUFF: 160; 4.5 AEROSOL RESPIRATORY (INHALATION) at 09:45

## 2018-11-10 RX ADMIN — SPIRONOLACTONE 25 MG: 25 TABLET ORAL at 09:35

## 2018-11-10 RX ADMIN — ASPIRIN 81 MG 81 MG: 81 TABLET ORAL at 09:35

## 2018-11-10 RX ADMIN — IPRATROPIUM BROMIDE AND ALBUTEROL SULFATE 3 ML: .5; 3 SOLUTION RESPIRATORY (INHALATION) at 19:32

## 2018-11-10 RX ADMIN — GABAPENTIN 300 MG: 300 CAPSULE ORAL at 23:04

## 2018-11-10 RX ADMIN — ISOSORBIDE MONONITRATE 30 MG: 30 TABLET, EXTENDED RELEASE ORAL at 09:35

## 2018-11-10 RX ADMIN — LORATADINE 10 MG: 10 TABLET ORAL at 09:35

## 2018-11-10 RX ADMIN — GABAPENTIN 300 MG: 300 CAPSULE ORAL at 16:55

## 2018-11-10 RX ADMIN — DOCUSATE SODIUM 100 MG: 100 CAPSULE, LIQUID FILLED ORAL at 09:35

## 2018-11-10 RX ADMIN — CEFEPIME HYDROCHLORIDE 1000 MG: 1 INJECTION, POWDER, FOR SOLUTION INTRAMUSCULAR; INTRAVENOUS at 11:11

## 2018-11-10 RX ADMIN — CARBAMAZEPINE 100 MG: 100 TABLET, CHEWABLE ORAL at 09:35

## 2018-11-10 RX ADMIN — IPRATROPIUM BROMIDE AND ALBUTEROL SULFATE 3 ML: .5; 3 SOLUTION RESPIRATORY (INHALATION) at 14:37

## 2018-11-10 RX ADMIN — APIXABAN 2.5 MG: 2.5 TABLET, FILM COATED ORAL at 17:36

## 2018-11-10 NOTE — PLAN OF CARE
Problem: OCCUPATIONAL THERAPY ADULT  Goal: Performs self-care activities at highest level of function for planned discharge setting  See evaluation for individualized goals  Treatment Interventions: ADL retraining, Functional transfer training, Endurance training, Patient/family training, Equipment evaluation/education, Fine motor coordination activities, Compensatory technique education, Continued evaluation, Energy conservation, Activityengagement          See flowsheet documentation for full assessment, interventions and recommendations  Limitation: Decreased ADL status, Decreased endurance, Decreased fine motor control, Decreased self-care trans, Decreased high-level ADLs  Prognosis: Good  Assessment: Patient is a 70 y o  male seen for OT evaluation s/p admit to [id number] [place] [address] on 11/7/2018 w/Pneumonia  Patient presented to Ed with increasing shortness of breath  Patient had a recent left shoulder replacement  Commorbidities affecting patient's functional performance at time of assessment include:  history of COPD, paroxysmal AFib, coronary artery disease, combined systolic and diastolic CHF  Orders placed for OT evaluation and treatment  Performed at least two patient identifiers during session including name and wristband  Prior to admission, Patient reporting indepependent with ADLs/ IADLs, ambulatory with no AD,  lives alone in a one story apt, ground level, no MOLINA  Daughter lives local, patient is currently at [facility] for STR s/p Left shoulder reverse arthroplasty    Personal factors affecting patient at time of initial evaluation include: limited caregiver support, difficulty performing ADLs and difficulty performing IADLs  Upon evaluation, patient requires moderate assist for UB ADLs, maximal assist for LB ADLs, transfers and functional ambulation in room and bathroom with minimal  assist, without AD   Occupational performance is affected by the following deficits: decreased functional use of BUEs, limited active ROM, decreased muscle strength, impaired gross motor coordination, dynamic sit/ stand balance deficit with poor standing tolerance time for self care and functional mobility, decreased activity tolerance, decreased safety awareness, increased pain, orthopedic restrictions and postural control and postural alignment deficit, requiring external assistance to complete transitional movements  Therapist completed  extensive additional review of medical records and additional review of physical, cognitive or psychosocial history, clinical examination identifying 5 or more performance deficits, clinical decision making of a high complexity , consistent with a high complexity level evaluation  Patient to benefit from continued Occupational Therapy treatment while in the hospital to address deficits as defined above and maximize level of functional independence with ADLs and functional mobility  Occupational Performance areas to address include: grooming , bathing/ shower, dressing, toilet hygiene, transfer to all surfaces, functional mobility, health maintenance, medication routine/ management, IADLS: Household maintenance, IADLs: safety procedures, IADLs: meal prep/ clean up, Leisure Participation and Social participation        OT Discharge Recommendation: Short Term Rehab

## 2018-11-10 NOTE — PHYSICAL THERAPY NOTE
PT Progress Note (40min)  (10:25-11:05)       11/10/18 1105   Pain Assessment   Pain Assessment 0-10   Pain Score 4   Pain Type Surgical pain   Pain Location Shoulder   Pain Orientation Left   Hospital Pain Intervention(s) Ambulation/increased activity;Cold applied   Restrictions/Precautions   Weight Bearing Precautions Per Order Yes   LUE Weight Bearing Per Order NWB   Braces or Orthoses Sling  (L UE)   Other Precautions WBS;O2;Fall Risk;Pain;Bed Alarm; Chair Alarm   General   Chart Reviewed Yes   Response to Previous Treatment Patient with no complaints from previous session  Family/Caregiver Present No   Cognition   Orientation Level Oriented X4   Subjective   Subjective pt OOB in chair  pleasantly agreeable to PT session  "I don't really have much pain"  Bed Mobility   Supine to Sit Unable to assess  (pt OOB in chair)   Transfers   Sit to Stand 4  Minimal assistance   Additional items Assist x 1; Armrests; Verbal cues; Increased time required   Stand to Sit 4  Minimal assistance   Additional items Assist x 1; Armrests; Verbal cues; Increased time required   Additional Comments pt instructed on donning/doffing technique of L UE sling  Ambulation/Elevation   Gait pattern Decreased foot clearance; Inconsistent tatyana; Short stride   Gait Assistance 4  Minimal assist   Additional items Assist x 1;Verbal cues   Assistive Device None   Distance 50'x2 c standing rest   Balance   Static Sitting Good   Dynamic Sitting Good   Static Standing Fair   Dynamic Standing Fair -   Ambulatory Poor +   Endurance Deficit   Endurance Deficit Yes   Endurance Deficit Description SOB noted c ambulation   Activity Tolerance   Activity Tolerance Patient limited by fatigue;Patient limited by pain   Nurse Made Aware Terrance   Exercises   Heelslides Sitting;10 reps;AROM; Bilateral   Hip Abduction Sitting;10 reps;AROM; Bilateral   Knee AROM Long Arc Quad Sitting;10 reps;AROM; Bilateral   Ankle Pumps Sitting;10 reps;AROM; Bilateral   Marching Sitting;10 reps;AROM; Bilateral   Assessment   Prognosis Good   Problem List Decreased strength;Decreased range of motion;Decreased endurance; Impaired balance;Decreased mobility; Decreased skin integrity;Orthopedic restrictions;Pain   Assessment pt demonstrating progress c PT  OOB in chair upon arrival  instructed pt on donning/doffing technique of L UE sling c good verbal understanding  pt verbalized good understanding of NWB L UE c 100% compliance c transitions  progressed ambulation distance to 50'x2 min (A)x1 c standing rest 2* SOB  initiated B/L LE ther ex in sitting x10 reps c AROM  remained OOB in chair at end of session c ice applied L shldr  chair alarm activated  will cont skilled PT to further maximize functional mobility + improve quality of life  upon d/c, recommend STR  may benefit from trial c [facility] next session  Barriers to Discharge None   Goals   Patient Goals "to get up and moving more"  [facility] Expiration Date 11/18/18   Treatment Day 1   Plan   Treatment/Interventions Functional transfer training;LE strengthening/ROM; Therapeutic exercise; Endurance training;Patient/family training;Equipment eval/education; Bed mobility;Gait training;Spoke to nursing;OT   Progress Progressing toward goals   PT Frequency 5x/wk; Weekend  (1x wkd)   Recommendation   Recommendation Short-term skilled PT   PT - OK to Discharge Yes  (to STR )     Arnold Lo PT

## 2018-11-10 NOTE — OCCUPATIONAL THERAPY NOTE
Occupational Therapy Evaluation        Patient Name: Srinivas Gregg  WJRMY'N Date: 11/10/2018         11/10/18 1118   Note Type   Note type Eval only   Restrictions/Precautions   Weight Bearing Precautions Per Order Yes   LUE Weight Bearing Per Order NWB   Braces or Orthoses Sling; Other (Comment)  (LUE sling with abductor pillow )   Other Precautions WBS; Limb alert; Fall Risk; Chair Alarm; Bed Alarm   Pain Assessment   Pain Assessment No/denies pain   Pain Score No Pain   Home Living   Type of Home Apartment  (one level/ ground floor apt no MOLINA)   Home Layout One level   Bathroom Shower/Tub Walk-in shower   Bathroom Toilet Raised   Bathroom Equipment Grab bars in shower   [address] Other (Comment)  (no AD at baseline)   Additional Comments currently at [address] [facility] for STR s/p Left shoulder replacement   Prior Function   Level of [place] Independent with ADLs and functional mobility   Lives With Alone   Receives Help From Family   ADL Assistance Independent   IADLs Needs assistance   Falls in the last 6 months 0   Vocational Retired   Lifestyle   Autonomy Patient reporting indepependent with ADLs/ IADLs, ambulatory with no AD,  lives alone in a one story apt, ground level, no MOLINA  Daughter lives local, patient is currently at [address] for STR s/p Left shoulder reverse arthroplasty     Reciprocal Relationships Supportive family   Service to Others Retired [facility] transit   [address] Photography/ lorenzo   Psychosocial   Psychosocial (WDL) WDL   ADL   Eating Assistance 5  [address] 4  Minimal Assistance   LB [address] 2  Maximal [address] 3  Moderate Assistance    [facility] [facility] [address] 2  Maximal [address]  2  Maximal Assistance   Functional Assistance 3  Moderate Assistance   Bed Mobility   Additional Comments received patient OOB to recliner Chair, Nursing reports assist of 1 with no AD  Transfers   Sit to Stand 4  Minimal assistance   Additional items Assist x 1; Increased time required;Verbal cues   Stand to Sit 4  Minimal assistance   Additional items Assist x 1; Increased time required;Verbal cues   Functional Mobility   Functional Mobility 4  Minimal assistance   Additional items (close contact guard)   Balance   Static Sitting Good   Dynamic Sitting Good   Static Standing Fair +   Dynamic Standing Fair   Activity Tolerance   Activity Tolerance Patient limited by fatigue   Nurse Made Aware BRUNA Holbrook  verbalized patient appropriate for therapy   RUE Assessment   RUE Assessment WFL   LUE Assessment   LUE Assessment X  (left shoulder immobilized via sling and abductor pillow )   Hand Function   Gross Motor Coordination Functional   Fine Motor Coordination Impaired   Sensation   Light Touch No apparent deficits   Vision-Basic Assessment   Current Vision Wears glasses only for reading   Patient Visual Report Other (Comment)  (No significant changes reported)   Perception   Inattention/Neglect Appears intact   Cognition   Overall Cognitive Status WFL   Arousal/Participation Alert; Responsive; Cooperative   Attention Within functional limits   Orientation Level Oriented X4   Memory Within functional limits   Following Commands Follows all commands and directions without difficulty   Assessment   Limitation Decreased ADL status; Decreased endurance;Decreased fine motor control;Decreased self-care trans;Decreased high-level ADLs   Prognosis Good   Assessment Patient is a 70 y o  male seen for OT evaluation s/p admit to [address] [facility] on 11/7/2018 w/Pneumonia  Patient presented to Ed with increasing shortness of breath  Patient had a recent left shoulder replacement    Commorbidities affecting patient's functional performance at time of assessment include:  history of COPD, paroxysmal AFib, coronary artery disease, combined systolic and diastolic CHF  Orders placed for OT evaluation and treatment  Performed at least two patient identifiers during session including name and wristband  Prior to admission, Patient reporting indepependent with ADLs/ IADLs, ambulatory with no AD,  lives alone in a one story apt, ground level, no MOLINA  Daughter lives local, patient is currently at [facility] for STR s/p Left shoulder reverse arthroplasty    Personal factors affecting patient at time of initial evaluation include: limited caregiver support, difficulty performing ADLs and difficulty performing IADLs  Upon evaluation, patient requires moderate assist for UB ADLs, maximal assist for LB ADLs, transfers and functional ambulation in room and bathroom with minimal  assist, without AD  Occupational performance is affected by the following deficits: decreased functional use of BUEs, limited active ROM, decreased muscle strength, impaired gross motor coordination, dynamic sit/ stand balance deficit with poor standing tolerance time for self care and functional mobility, decreased activity tolerance, decreased safety awareness, increased pain, orthopedic restrictions and postural control and postural alignment deficit, requiring external assistance to complete transitional movements  Therapist completed  extensive additional review of medical records and additional review of physical, cognitive or psychosocial history, clinical examination identifying 5 or more performance deficits, clinical decision making of a high complexity , consistent with a high complexity level evaluation  Patient to benefit from continued Occupational Therapy treatment while in the hospital to address deficits as defined above and maximize level of functional independence with ADLs and functional mobility   Occupational Performance areas to address include: grooming , bathing/ shower, dressing, toilet hygiene, transfer to all surfaces, functional mobility, health maintenance, medication routine/ management, IADLS: Household maintenance, IADLs: safety procedures, IADLs: meal prep/ clean up, Leisure Participation and Social participation  Goals   Patient Goals to be able to function without getting out of breath"   Plan   Treatment Interventions ADL retraining;Functional transfer training; Endurance training;Patient/family training;Equipment evaluation/education; Fine motor coordination activities; Compensatory technique education;Continued evaluation; Energy conservation; Activityengagement   Goal Expiration Date 11/24/18   OT Frequency 3-5x/wk   Recommendation   OT Discharge Recommendation Short Term Rehab   Barthel Index   Feeding 5   Bathing 0   Grooming Score 5   Dressing Score 5   Bladder Score 10   Bowels Score 10   Toilet Use Score 5   Transfers (Bed/Chair) Score 10   Mobility (Level Surface) Score 0   Stairs Score 0   Barthel Index Score 50   Modified Clements Scale   Modified Sagrario Scale 4       Occupational therapy Goals: In 7-10 days    Patient will verbalize/ demonstrate back safety precautions during functional activity  with No verbal cues    Patient will demonstrate correct of  use of long handle equipment to complete LB ADLs @ Mod I  level  Patient will increase standing tolerance time to 5 minutes with  Unilateral UE support to complete sink level ADLs @  Mod I  level    Patient will verbalize and demonstrate use of one handed ADL technique for UB/ LB ADLs with the use of AE as indicated  Patient will verbalize 3  safety awareness/ body mechanics principles to  prevent falls in the home setting  Patient will complete UB/LB ADLs @ Mod I level  Patient will complete light meal prep @ Mod I level  Patient will complete transfers to all surfaces @ Mod I level with AD as indicated

## 2018-11-10 NOTE — PROGRESS NOTES
[facility] Internal Medicine Progress Note  Patient: Kishore Colin 70 y o  male   MRN: [id number]  PCP: Rand Cheng MD  Unit/Bed#: -01 Encounter: [id number]  Date Of Visit: 11/10/18    Assessment:    Principal Problem:    Pneumonia  Active Problems:    Paroxysmal atrial fibrillation (HCC)    S/P reverse total shoulder arthroplasty, left    SOB (shortness of breath)      Plan:    · 1  HCAP- possibly secondary to gram negative organisms- on vancomycin and cefepime  SOB improving  · 2  COPD with acute exacerbation- on IV steroids, dounebs and 02  · 3  History of recent left shoulder arthroplasty 18- stable  · 4  Afib- on eliquis  · 5  Chronic combined systolic and diastolic failure in the setting of CAD a/e/b continued SOB (exacerbated by current PNA dx) requiring monitoring, and continued treatment with imdur and demadex   · 6  Anemia- no signs of bleeding  Will continue to monitor H/H  ·        VTE Pharmacologic Prophylaxis:   Pharmacologic: Apixaban (Eliquis)  Mechanical VTE Prophylaxis in Place: Yes    Patient Centered Rounds: I have performed bedside rounds with nursing staff today  Discussions with Specialists or Other Care Team Provider:     Education and Discussions with Family / Patient:     Time Spent for Care: 20 minutes  More than 50% of total time spent on counseling and coordination of care as described above  Current Length of Stay: 3 day(s)    Current Patient Status: Inpatient   Certification Statement: The patient will continue to require additional inpatient hospital stay due to pneumonia    Discharge Plan / Estimated Discharge Date: once pneumonia improves    Code Status: Level 1 - Full Code      Subjective:   Patient seen and examined at bedside  Patient has no new complaints      Objective:     Vitals:   Temp (24hrs), Av 1 °F (37 3 °C), Min:98 5 °F (36 9 °C), Max:100 °F (37 8 °C)    Temp:  [98 5 °F (36 9 °C)-100 °F (37 8 °C)] 98 9 °F (37 2 °C)  HR:  Rajni Salgado 66  Resp:  [18] 18  BP: (108-112)/(63-64) 111/64  SpO2:  [90 %-97 %] 96 %  Body mass index is 26 84 kg/m²  Input and Output Summary (last 24 hours): Intake/Output Summary (Last 24 hours) at 11/10/18 1307  Last data filed at 11/09/18 1358   Gross per 24 hour   Intake                0 ml   Output              600 ml   Net             -600 ml       Physical Exam:     Physical Exam   Constitutional: He is oriented to person, place, and time  He appears well-developed and well-nourished  HENT:   Head: Normocephalic and atraumatic  Eyes: Pupils are equal, round, and reactive to light  Conjunctivae and EOM are normal    Neck: Normal range of motion  Neck supple  No tracheal deviation present  No thyromegaly present  Cardiovascular: Normal rate, regular rhythm and normal heart sounds  Exam reveals no gallop and no friction rub  No murmur heard  Pulmonary/Chest: Effort normal and breath sounds normal  No respiratory distress  He has no wheezes  He has no rales  Abdominal: Soft  Bowel sounds are normal  He exhibits no distension  There is no tenderness  There is no rebound  Musculoskeletal: Normal range of motion  He exhibits no edema  Left shoulder sling   Neurological: He is alert and oriented to person, place, and time  Vitals reviewed  Additional Data:     Labs:      Results from last 7 days  Lab Units 11/09/18  0431   WBC Thousand/uL 6 52   HEMOGLOBIN g/dL 8 2*   HEMATOCRIT % 24 9*   PLATELETS Thousands/uL 97*   NEUTROS PCT % 79*   LYMPHS PCT % 10*   MONOS PCT % 9   EOS PCT % 1       Results from last 7 days  Lab Units 11/10/18  0438  11/07/18  2157   POTASSIUM mmol/L 3 3*  < > 3 9   CHLORIDE mmol/L 99*  < > 97*   CO2 mmol/L 28  < > 32   BUN mg/dL 14  < > 20   CREATININE mg/dL 0 84  < > 1 03   CALCIUM mg/dL 7 9*  < > 8 8   ALK PHOS U/L  --   --  108   ALT U/L  --   --  13   AST U/L  --   --  18   < > = values in this interval not displayed      Results from last 7 days  Lab Units 11/07/18  2157   INR  1 29*       * I Have Reviewed All Lab Data Listed Above  * Additional Pertinent Lab Tests Reviewed: Rekha 66 Admission Reviewed    Imaging:    Imaging Reports Reviewed Today Include:   Imaging Personally Reviewed by Myself Includes:      Recent Cultures (last 7 days):       Results from last 7 days  Lab Units 11/07/18  2216 11/07/18 2157   BLOOD CULTURE  No Growth at 48 hrs  No Growth at 48 hrs         Last 24 Hours Medication List:     Current Facility-Administered Medications:  acetaminophen 325 mg Oral Q6H PRN Mariluz Gordon MD    acetaminophen 650 mg Oral Q6H PRN Mariluz Gordon MD    apixaban 2 5 mg Oral BID Mariluz Gordon MD    aspirin 81 mg Oral Daily Mariluz Gordon MD    atorvastatin 40 mg Oral Daily Mariluz Gordon MD    budesonide-formoterol 2 puff Inhalation BID Mariluz Gordon MD    calcium carbonate 500 mg Oral TID PRN Mariluz Gordon MD    carBAMazepine 100 mg Oral BID Mariluz Gordon MD    cefepime 1,000 mg Intravenous Q12H Mariluz Gordon MD Last Rate: 1,000 mg (11/10/18 1111)   docusate sodium 100 mg Oral BID Mariluz Gordon MD    DULoxetine 60 mg Oral Daily Mariluz Gordon MD    gabapentin 300 mg Oral TID Mariluz Gordon MD    ibuprofen 400 mg Oral Q6H PRN Jatinder Yanez MD    ipratropium-albuterol 3 mL Nebulization Q6H Jatinder Yanez MD    isosorbide mononitrate 30 mg Oral Daily Mariluz Gordon MD    loratadine 10 mg Oral Daily Mariluz Gordon MD    melatonin 3 mg Oral HS Mariluz Gordon MD    methylPREDNISolone sodium succinate 40 mg Intravenous [facility] Jatinder Yanez MD    ondansetron 4 mg Intravenous Q6H PRN Mariluz Gordon MD    oxyCODONE 5 mg Oral Q4H PRN Mariluz Gordon MD    QUEtiapine 25 mg Oral HS Mariluz Gordon MD    spironolactone 25 mg Oral Daily Mariluz Gordon MD    torsemide 40 mg Oral Daily Mariluz Gordon MD    traZODone 50 mg Oral HS Mariluz Gordon MD    vancomycin 15 mg/kg Intravenous Q12H Peggy Warner MD Last Rate: 1,500 mg (11/09/18 5502)        Today, Patient Was Seen By: Mc Barros MD    ** Please Note: This note has been constructed using a voice recognition system   **

## 2018-11-10 NOTE — PLAN OF CARE
Problem: PHYSICAL THERAPY ADULT  Goal: Performs mobility at highest level of function for planned discharge setting  See evaluation for individualized goals  Treatment/Interventions: ADL retraining, Functional transfer training, LE strengthening/ROM, Therapeutic exercise, Endurance training, Patient/family training, Equipment eval/education, Bed mobility, Gait training, Spoke to nursing, OT  Equipment Re[facility]:  [facility]       See flowsheet documentation for full assessment, interventions and recommendations  Outcome: Progressing  Prognosis: Good  Problem List: Decreased strength, Decreased range of motion, Decreased endurance, Impaired balance, Decreased mobility, Decreased skin integrity, Orthopedic restrictions, Pain  Assessment: pt demonstrating progress c PT  OOB in chair upon arrival  instructed pt on donning/doffing technique of L UE sling c good verbal understanding  pt verbalized good understanding of NWB L UE c 100% compliance c transitions  progressed ambulation distance to 50'x2 min (A)x1 c standing rest 2* SOB  initiated B/L LE ther ex in sitting x10 reps c AROM  remained OOB in chair at end of session c ice applied L shldr  chair alarm activated  will cont skilled PT to further maximize functional mobility + improve quality of life  upon d/c, recommend STR  may benefit from trial c [facility] next session  Barriers to Discharge: None     Recommendation: Short-term skilled PT     PT - OK to Discharge: Yes (to STR )    See flowsheet documentation for full assessment

## 2018-11-11 LAB
ANION GAP SERPL CALCULATED.3IONS-SCNC: 7 MMOL/L (ref 4–13)
BASOPHILS # BLD AUTO: 0.01 THOUSANDS/ΜL (ref 0–0.1)
BASOPHILS NFR BLD AUTO: 0 % (ref 0–1)
BUN SERPL-MCNC: 16 MG/DL (ref 5–25)
CALCIUM SERPL-MCNC: 8.4 MG/DL (ref 8.3–10.1)
CHLORIDE SERPL-SCNC: 99 MMOL/L (ref 100–108)
CO2 SERPL-SCNC: 31 MMOL/L (ref 21–32)
CREAT SERPL-MCNC: 0.87 MG/DL (ref 0.6–1.3)
EOSINOPHIL # BLD AUTO: 0 THOUSAND/ΜL (ref 0–0.61)
EOSINOPHIL NFR BLD AUTO: 0 % (ref 0–6)
ERYTHROCYTE [DISTWIDTH] IN BLOOD BY AUTOMATED COUNT: 13 % (ref 11.6–15.1)
GFR SERPL CREATININE-BSD FRML MDRD: 88 ML/MIN/1.73SQ M
GLUCOSE SERPL-MCNC: 139 MG/DL (ref 65–140)
HCT VFR BLD AUTO: 26.7 % (ref 36.5–49.3)
HGB BLD-MCNC: 8.8 G/DL (ref 12–17)
IMM GRANULOCYTES # BLD AUTO: 0.03 THOUSAND/UL (ref 0–0.2)
IMM GRANULOCYTES NFR BLD AUTO: 1 % (ref 0–2)
LYMPHOCYTES # BLD AUTO: 0.31 THOUSANDS/ΜL (ref 0.6–4.47)
LYMPHOCYTES NFR BLD AUTO: 7 % (ref 14–44)
MCH RBC QN AUTO: 33.6 PG (ref 26.8–34.3)
MCHC RBC AUTO-ENTMCNC: 33 G/DL (ref 31.4–37.4)
MCV RBC AUTO: 102 FL (ref 82–98)
MONOCYTES # BLD AUTO: 0.31 THOUSAND/ΜL (ref 0.17–1.22)
MONOCYTES NFR BLD AUTO: 7 % (ref 4–12)
NEUTROPHILS # BLD AUTO: 4.09 THOUSANDS/ΜL (ref 1.85–7.62)
NEUTS SEG NFR BLD AUTO: 85 % (ref 43–75)
NRBC BLD AUTO-RTO: 0 /100 WBCS
PLATELET # BLD AUTO: 139 THOUSANDS/UL (ref 149–390)
PMV BLD AUTO: 9.7 FL (ref 8.9–12.7)
POTASSIUM SERPL-SCNC: 4.2 MMOL/L (ref 3.5–5.3)
RBC # BLD AUTO: 2.62 MILLION/UL (ref 3.88–5.62)
SODIUM SERPL-SCNC: 137 MMOL/L (ref 136–145)
WBC # BLD AUTO: 4.75 THOUSAND/UL (ref 4.31–10.16)

## 2018-11-11 PROCEDURE — 97530 THERAPEUTIC ACTIVITIES: CPT

## 2018-11-11 PROCEDURE — 94760 N-INVAS EAR/PLS OXIMETRY 1: CPT

## 2018-11-11 PROCEDURE — 85025 COMPLETE CBC W/AUTO DIFF WBC: CPT | Performed by: INTERNAL MEDICINE

## 2018-11-11 PROCEDURE — 80048 BASIC METABOLIC PNL TOTAL CA: CPT | Performed by: INTERNAL MEDICINE

## 2018-11-11 PROCEDURE — 99232 SBSQ HOSP IP/OBS MODERATE 35: CPT | Performed by: INTERNAL MEDICINE

## 2018-11-11 PROCEDURE — 94640 AIRWAY INHALATION TREATMENT: CPT

## 2018-11-11 PROCEDURE — 97535 SELF CARE MNGMENT TRAINING: CPT

## 2018-11-11 RX ORDER — LEVALBUTEROL 1.25 MG/.5ML
SOLUTION, CONCENTRATE RESPIRATORY (INHALATION)
Status: COMPLETED
Start: 2018-11-11 | End: 2018-11-11

## 2018-11-11 RX ORDER — GUAIFENESIN 600 MG
600 TABLET, EXTENDED RELEASE 12 HR ORAL EVERY 12 HOURS SCHEDULED
Status: DISCONTINUED | OUTPATIENT
Start: 2018-11-11 | End: 2018-11-13 | Stop reason: HOSPADM

## 2018-11-11 RX ORDER — IPRATROPIUM BROMIDE AND ALBUTEROL SULFATE 2.5; .5 MG/3ML; MG/3ML
3 SOLUTION RESPIRATORY (INHALATION)
Status: DISCONTINUED | OUTPATIENT
Start: 2018-11-11 | End: 2018-11-13 | Stop reason: HOSPADM

## 2018-11-11 RX ADMIN — GUAIFENESIN 600 MG: 600 TABLET, EXTENDED RELEASE ORAL at 11:07

## 2018-11-11 RX ADMIN — GUAIFENESIN 600 MG: 600 TABLET, EXTENDED RELEASE ORAL at 22:49

## 2018-11-11 RX ADMIN — MELATONIN 3 MG: at 22:50

## 2018-11-11 RX ADMIN — ATORVASTATIN CALCIUM 40 MG: 40 TABLET, FILM COATED ORAL at 09:43

## 2018-11-11 RX ADMIN — TRAZODONE HYDROCHLORIDE 50 MG: 50 TABLET ORAL at 22:50

## 2018-11-11 RX ADMIN — ASPIRIN 81 MG 81 MG: 81 TABLET ORAL at 09:43

## 2018-11-11 RX ADMIN — LORATADINE 10 MG: 10 TABLET ORAL at 09:43

## 2018-11-11 RX ADMIN — METHYLPREDNISOLONE SODIUM SUCCINATE 40 MG: 40 INJECTION, POWDER, FOR SOLUTION INTRAMUSCULAR; INTRAVENOUS at 22:50

## 2018-11-11 RX ADMIN — GABAPENTIN 300 MG: 300 CAPSULE ORAL at 09:44

## 2018-11-11 RX ADMIN — DOCUSATE SODIUM 100 MG: 100 CAPSULE, LIQUID FILLED ORAL at 09:44

## 2018-11-11 RX ADMIN — TORSEMIDE 40 MG: 20 TABLET ORAL at 09:43

## 2018-11-11 RX ADMIN — METHYLPREDNISOLONE SODIUM SUCCINATE 40 MG: 40 INJECTION, POWDER, FOR SOLUTION INTRAMUSCULAR; INTRAVENOUS at 14:45

## 2018-11-11 RX ADMIN — DOCUSATE SODIUM 100 MG: 100 CAPSULE, LIQUID FILLED ORAL at 17:39

## 2018-11-11 RX ADMIN — LEVALBUTEROL HYDROCHLORIDE 1.25 MG: 1.25 SOLUTION, CONCENTRATE RESPIRATORY (INHALATION) at 09:49

## 2018-11-11 RX ADMIN — DULOXETINE HYDROCHLORIDE 60 MG: 60 CAPSULE, DELAYED RELEASE ORAL at 09:43

## 2018-11-11 RX ADMIN — IPRATROPIUM BROMIDE 0.5 MG: 0.5 SOLUTION RESPIRATORY (INHALATION) at 09:49

## 2018-11-11 RX ADMIN — CEFEPIME HYDROCHLORIDE 1000 MG: 1 INJECTION, POWDER, FOR SOLUTION INTRAMUSCULAR; INTRAVENOUS at 22:50

## 2018-11-11 RX ADMIN — IPRATROPIUM BROMIDE AND ALBUTEROL SULFATE 3 ML: .5; 3 SOLUTION RESPIRATORY (INHALATION) at 20:23

## 2018-11-11 RX ADMIN — BUDESONIDE AND FORMOTEROL FUMARATE DIHYDRATE 2 PUFF: 160; 4.5 AEROSOL RESPIRATORY (INHALATION) at 09:44

## 2018-11-11 RX ADMIN — APIXABAN 2.5 MG: 2.5 TABLET, FILM COATED ORAL at 09:44

## 2018-11-11 RX ADMIN — QUETIAPINE 25 MG: 25 TABLET ORAL at 22:50

## 2018-11-11 RX ADMIN — SPIRONOLACTONE 25 MG: 25 TABLET ORAL at 09:43

## 2018-11-11 RX ADMIN — IPRATROPIUM BROMIDE AND ALBUTEROL SULFATE 3 ML: .5; 3 SOLUTION RESPIRATORY (INHALATION) at 14:19

## 2018-11-11 RX ADMIN — GABAPENTIN 300 MG: 300 CAPSULE ORAL at 15:07

## 2018-11-11 RX ADMIN — APIXABAN 2.5 MG: 2.5 TABLET, FILM COATED ORAL at 17:39

## 2018-11-11 RX ADMIN — VANCOMYCIN HYDROCHLORIDE 1500 MG: 1 INJECTION, POWDER, LYOPHILIZED, FOR SOLUTION INTRAVENOUS at 00:28

## 2018-11-11 RX ADMIN — DEXTRAN 70 AND HYPROMELLOSE 2910 1 DROP: 1; 3 SOLUTION/ DROPS OPHTHALMIC at 23:53

## 2018-11-11 RX ADMIN — VANCOMYCIN HYDROCHLORIDE 1500 MG: 1 INJECTION, POWDER, LYOPHILIZED, FOR SOLUTION INTRAVENOUS at 23:53

## 2018-11-11 RX ADMIN — CARBAMAZEPINE 100 MG: 100 TABLET, CHEWABLE ORAL at 09:43

## 2018-11-11 RX ADMIN — CARBAMAZEPINE 100 MG: 100 TABLET, CHEWABLE ORAL at 17:40

## 2018-11-11 RX ADMIN — GABAPENTIN 300 MG: 300 CAPSULE ORAL at 22:50

## 2018-11-11 RX ADMIN — METHYLPREDNISOLONE SODIUM SUCCINATE 40 MG: 40 INJECTION, POWDER, FOR SOLUTION INTRAMUSCULAR; INTRAVENOUS at 05:43

## 2018-11-11 RX ADMIN — ISOSORBIDE MONONITRATE 30 MG: 30 TABLET, EXTENDED RELEASE ORAL at 09:43

## 2018-11-11 RX ADMIN — CEFEPIME HYDROCHLORIDE 1000 MG: 1 INJECTION, POWDER, FOR SOLUTION INTRAMUSCULAR; INTRAVENOUS at 11:48

## 2018-11-11 RX ADMIN — VANCOMYCIN HYDROCHLORIDE 1500 MG: 1 INJECTION, POWDER, LYOPHILIZED, FOR SOLUTION INTRAVENOUS at 12:46

## 2018-11-11 RX ADMIN — IPRATROPIUM BROMIDE AND ALBUTEROL SULFATE 3 ML: .5; 3 SOLUTION RESPIRATORY (INHALATION) at 09:07

## 2018-11-11 NOTE — PROGRESS NOTES
[facility] Progress Note  Patient: Sukhwinder Flores 70 y o  male   MRN: [id number]  PCP: Maria Dolores Pugh MD  Unit/Bed#: -01 Encounter: [id number]  Date Of Visit: 18    Assessment:    Principal Problem:    Pneumonia  Active Problems:    Paroxysmal atrial fibrillation (HCC)    S/P reverse total shoulder arthroplasty, left    SOB (shortness of breath)      Plan:    · 1  HCAP- possibly secondary to gram negative organisms- on vancomycin and cefepime  SOB improving  · 2  COPD with acute exacerbation- on IV steroids, dounebs and 02  Patient still wheezing  Pulmonary consulted  · 3  History of recent left shoulder arthroplasty 18- stable  · 4  Afib- on eliquis  · 5  Chronic combined systolic and diastolic failure in the setting of CAD a/e/b continued SOB (exacerbated by current PNA dx) requiring monitoring, and continued treatment with imdur and demadex   · 6  Anemia- no signs of bleeding  Will continue to monitor H/H  ·        VTE Pharmacologic Prophylaxis:   Pharmacologic: Apixaban (Eliquis)  Mechanical VTE Prophylaxis in Place: Yes    Patient Centered Rounds: I have performed bedside rounds with nursing staff today  Discussions with Specialists or Other Care Team Provider:     Education and Discussions with Family / Patient:     Time Spent for Care: 20 minutes  More than 50% of total time spent on counseling and coordination of care as described above  Current Length of Stay: 4 day(s)    Current Patient Status: Inpatient   Certification Statement: The patient will continue to require additional inpatient hospital stay due to pneumonia    Discharge Plan / Estimated Discharge Date: once pneumonia improves    Code Status: Level 1 - Full Code      Subjective:   Patient seen and examined at bedside  Patient has no new complaints      Objective:     Vitals:   Temp (24hrs), Av 7 °F (37 1 °C), Min:98 2 °F (36 8 °C), Max:99 °F (37 2 °C)    Temp:  [98 2 °F (36 8 °C)-99 °F (37 2 °C)] 98 2 °F (36 8 °C)  HR:  [69-79] 69  Resp:  [18] 18  BP: ()/(55-84) 142/84  SpO2:  [92 %-95 %] 95 %  Body mass index is 26 84 kg/m²  Input and Output Summary (last 24 hours): Intake/Output Summary (Last 24 hours) at 11/11/18 1305  Last data filed at 11/11/18 1257   Gross per 24 hour   Intake              240 ml   Output              550 ml   Net             -310 ml       Physical Exam:     Physical Exam   Constitutional: He is oriented to person, place, and time  He appears well-developed and well-nourished  HENT:   Head: Normocephalic and atraumatic  Eyes: Pupils are equal, round, and reactive to light  Conjunctivae and EOM are normal    Neck: Normal range of motion  Neck supple  No tracheal deviation present  No thyromegaly present  Cardiovascular: Normal rate, regular rhythm and normal heart sounds  Exam reveals no gallop and no friction rub  No murmur heard  Pulmonary/Chest: Effort normal  No respiratory distress  He has wheezes  He has no rales  Abdominal: Soft  Bowel sounds are normal  He exhibits no distension  There is no tenderness  There is no rebound  Musculoskeletal: Normal range of motion  He exhibits no edema  Left shoulder sling   Neurological: He is alert and oriented to person, place, and time  Vitals reviewed  Additional Data:     Labs:      Results from last 7 days  Lab Units 11/11/18  0539   WBC Thousand/uL 4 75   HEMOGLOBIN g/dL 8 8*   HEMATOCRIT % 26 7*   PLATELETS Thousands/uL 139*   NEUTROS PCT % 85*   LYMPHS PCT % 7*   MONOS PCT % 7   EOS PCT % 0       Results from last 7 days  Lab Units 11/11/18  0539  11/07/18  2157   POTASSIUM mmol/L 4 2  < > 3 9   CHLORIDE mmol/L 99*  < > 97*   CO2 mmol/L 31  < > 32   BUN mg/dL 16  < > 20   CREATININE mg/dL 0 87  < > 1 03   CALCIUM mg/dL 8 4  < > 8 8   ALK PHOS U/L  --   --  108   ALT U/L  --   --  13   AST U/L  --   --  18   < > = values in this interval not displayed      Results from last 7 days  Lab Units 11/07/18  2157   INR  1 29*       * I Have Reviewed All Lab Data Listed Above  * Additional Pertinent Lab Tests Reviewed: Rekha Lemons Admission Reviewed    Imaging:    Imaging Reports Reviewed Today Include:   Imaging Personally Reviewed by Myself Includes:      Recent Cultures (last 7 days):       Results from last 7 days  Lab Units 11/07/18 2216 11/07/18 2157   BLOOD CULTURE  No Growth at 72 hrs  No Growth at 72 hrs         Last 24 Hours Medication List:     Current Facility-Administered Medications:  acetaminophen 325 mg Oral Q6H PRN Mikey Tarango MD    acetaminophen 650 mg Oral Q6H PRN Mikey Tarango MD    apixaban 2 5 mg Oral BID Mikey Tarango MD    aspirin 81 mg Oral Daily Mikey Tarango MD    atorvastatin 40 mg Oral Daily Mikey Tarango MD    budesonide-formoterol 2 puff Inhalation BID Mikey Tarango MD    calcium carbonate 500 mg Oral TID PRN Mikey Tarango MD    carBAMazepine 100 mg Oral BID Mikey Tarango MD    cefepime 1,000 mg Intravenous Q12H Mikey Tarango MD Last Rate: 1,000 mg (11/11/18 1148)   dextran 70-hypromellose 1 drop Both Eyes Q3H PRN Taylor De La Cruz MD    docusate sodium 100 mg Oral BID Mikey Tarango MD    DULoxetine 60 mg Oral Daily Mikey Tarango MD    gabapentin 300 mg Oral TID Mikey Tarango MD    guaiFENesin 600 mg Oral Q12H [address] Taylor De La Cruz MD    ibuprofen 400 mg Oral Q6H PRN Taylor De La Cruz MD    ipratropium-albuterol 3 mL Nebulization TID Taylor De La Cruz MD    isosorbide mononitrate 30 mg Oral Daily Mikey Tarango MD    loratadine 10 mg Oral Daily Mikey Tarango MD    melatonin 3 mg Oral HS Mikey Tarango MD    methylPREDNISolone sodium succinate 40 mg Intravenous [facility] Taylor De La Cruz MD    ondansetron 4 mg Intravenous Q6H PRN Mikey Tarango MD    oxyCODONE 5 mg Oral Q4H PRN Mikey Tarango MD    QUEtiapine 25 mg Oral HS Mikey Tarango MD    spironolactone 25 mg Oral Daily Humaira Carballo MD    torsemide 40 mg Oral Daily Humaira Carballo MD    traZODone 50 mg Oral HS Humaira Carballo MD    vancomycin 15 mg/kg Intravenous Q12H Humaira Carballo MD Last Rate: 1,500 mg (11/11/18 1246)        Today, Patient Was Seen By: Hanna Gan MD    ** Please Note: This note has been constructed using a voice recognition system   **

## 2018-11-11 NOTE — PLAN OF CARE
Problem: OCCUPATIONAL THERAPY ADULT  Goal: Performs self-care activities at highest level of function for planned discharge setting  See evaluation for individualized goals  Treatment Interventions: ADL retraining, Functional transfer training, Endurance training, Patient/family training, Equipment evaluation/education, Fine motor coordination activities, Compensatory technique education, Continued evaluation, Energy conservation, Activityengagement          See flowsheet documentation for full assessment, interventions and recommendations  Outcome: Progressing  Limitation: Decreased ADL status, Decreased endurance, Decreased fine motor control, Decreased self-care trans, Decreased high-level ADLs  Prognosis: Good  Assessment: Pt cooperative and agreeable to skilled OT services with focus on improving functional mobility and self care skills while maintaining shoulder precautions  Pt supine in bed upon start of session req min A supine > sit/ sit > stand   Demonstrated good recall/ adherence to shdr precautions  During all presented tasks  Pt able to amb <> bathrm with CGA to steady  Pt performed ADL in stance/ seated at sink  A needed for washing under R UE, buttocks and B LE's  LH AE demonstrated with pt able to don sock using SA  Pt fatigued easily requiring occasional rest breaks during which time education on pursed lip breathing technique was provided, to assist pt with stress/pain management  Additional ed  Provided on safe body mechnaics during func txfers/ amb with pt demonstrating good understanding of all material presented  Pt demonstrated fair activity tolerance during presented tasks  Pt performance demonstrated good carryover of learned techniques and strategies to facilitate safety during self care and daily routine, however pt continues to demonstrate deficits in the areas of self care and functional mobility  Pt would continue to benefit from skilled OT POC to facilitate return to PLOF  OT Discharge Recommendation: Short Term Rehab  OT - OK to Discharge: Yes (when medically cleared)

## 2018-11-11 NOTE — OCCUPATIONAL THERAPY NOTE
OCCUPATIONAL THERAPY TREATMENT  NOTE     11/11/18 0825   Restrictions/Precautions   Weight Bearing Precautions Per Order Yes   RUE Weight Bearing Per Order FWB   LUE Weight Bearing Per Order NWB   RLE Weight Bearing Per Order FWB   LLE Weight Bearing Per Order FWB   Braces or Orthoses Sling  (LUE slihng with abductor pillow)   Other Precautions Chair Alarm; Bed Alarm;WBS; Limb alert; Fall Risk   Pain Assessment   Pain Assessment No/denies pain   Pain Score No Pain   ADL   Where Assessed Sitting at sink   Equipment Provided Reacher;Sock aid;Long-handled shoe horn;Long-handled sponge   Grooming Assistance 4  Minimal Assistance   Grooming Deficit Setup;Verbal cueing;Supervision/safety; Increased time to complete; Teeth care  (A needed to open containers)   UB Bathing Assistance 4  Minimal Assistance   UB Bathing Deficit Setup;Steadying;Verbal cueing;Supervision/safety; Increased time to complete;Right arm   UB Bathing Comments unable to wash LUE due to immobilzer   LB Bathing Assistance 3  Moderate Assistance   LB Bathing Deficit Setup;Steadying;Verbal cueing;Supervision/safety; Increased time to complete; Buttocks;Right lower leg including foot; Left lower leg including foot   UB Dressing Assistance 2  Maximal Assistance   UB Dressing Deficit (Max A to don gown with immobilizer)   LB Dressing Assistance Unable to assess   LB Dressing Comments Donned socks with use of SA   Bed Mobility   Rolling R 4  Minimal assistance   Additional items Assist x 1;HOB elevated; Bedrails; Increased time required;Verbal cues   Supine to Sit 4  Minimal assistance   Additional items Assist x 1;HOB elevated; Bedrails; Increased time required;Verbal cues   Transfers   Sit to Stand 4  Minimal assistance   Additional items Assist x 1;Bedrails; Increased time required   Stand to Sit 4  Minimal assistance   Additional items Assist x 1; Armrests; Increased time required   Functional Mobility   Functional Mobility 4 Minimal assistance   Additional Comments (CGA)   Cognition   Overall Cognitive Status WFL   Arousal/Participation Alert; Cooperative   Attention Within functional limits   Orientation Level Oriented X4   Memory Within functional limits   Following Commands Follows all commands and directions without difficulty   Comments Pt agreeable to skilled OT   Additional Activities   Additional Activities Other (Comment)  (Ed on PLB techn )   Activity Tolerance   Activity Tolerance Patient tolerated treatment well   Medical Staff Made Aware yes, BRUNA Garvin made aware   Assessment   Assessment Pt cooperative and agreeable to skilled OT services with focus on improving functional mobility and self care skills while maintaining shoulder precautions  Pt supine in bed upon start of session req min A supine > sit/ sit > stand   Demonstrated good recall/ adherence to shdr precautions  During all presented tasks  Pt able to amb <> bathrm with CGA to steady  Pt performed ADL in stance/ seated at sink  A needed for washing under R UE, buttocks and B LE's  LH AE demonstrated with pt able to don sock using SA  Pt fatigued easily requiring occasional rest breaks during which time education on pursed lip breathing technique was provided, to assist pt with stress/pain management  Additional ed  Provided on safe body mechnaics during func txfers/ amb with pt demonstrating good understanding of all material presented  Pt demonstrated fair activity tolerance during presented tasks  Pt performance demonstrated good carryover of learned techniques and strategies to facilitate safety during self care and daily routine, however pt continues to demonstrate deficits in the areas of self care and functional mobility  Pt would continue to benefit from skilled OT POC to facilitate return to PLOF  Plan   Treatment Interventions ADL retraining;Visual perceptual retraining;Functional transfer training;UE strengthening/ROM; Endurance training;Cognitive reorientation;Patient/family training;Equipment evaluation/education; Fine motor coordination activities; Compensatory technique education;Continued evaluation; Energy conservation; Activityengagement   Goal Expiration Date 11/24/18   OT Frequency 3-5x/wk   Recommendation   OT Discharge Recommendation Short Term Rehab   OT - OK to Discharge Yes  (when medically cleared)                                                         JESÚS Clemens/L

## 2018-11-11 NOTE — PLAN OF CARE
Problem: Potential for Falls  Goal: Patient will remain free of falls  INTERVENTIONS:  - Assess patient frequently for physical needs  -  Identify cognitive and physical deficits and behaviors that affect risk of falls    -  Register fall precautions as indicated by assessment   - Educate patient/family on patient safety including physical limitations  - Instruct patient to call for assistance with activity based on assessment  - Modify environment to reduce risk of injury  - Consider OT/PT consult to assist with strengthening/mobility   Outcome: Progressing

## 2018-11-12 LAB
ANION GAP SERPL CALCULATED.3IONS-SCNC: 7 MMOL/L (ref 4–13)
BASOPHILS # BLD AUTO: 0.01 THOUSANDS/ΜL (ref 0–0.1)
BASOPHILS NFR BLD AUTO: 0 % (ref 0–1)
BUN SERPL-MCNC: 19 MG/DL (ref 5–25)
CALCIUM SERPL-MCNC: 8.2 MG/DL (ref 8.3–10.1)
CHLORIDE SERPL-SCNC: 100 MMOL/L (ref 100–108)
CO2 SERPL-SCNC: 30 MMOL/L (ref 21–32)
CREAT SERPL-MCNC: 0.75 MG/DL (ref 0.6–1.3)
EOSINOPHIL # BLD AUTO: 0 THOUSAND/ΜL (ref 0–0.61)
EOSINOPHIL NFR BLD AUTO: 0 % (ref 0–6)
ERYTHROCYTE [DISTWIDTH] IN BLOOD BY AUTOMATED COUNT: 13.2 % (ref 11.6–15.1)
GFR SERPL CREATININE-BSD FRML MDRD: 94 ML/MIN/1.73SQ M
GLUCOSE SERPL-MCNC: 131 MG/DL (ref 65–140)
HCT VFR BLD AUTO: 24.3 % (ref 36.5–49.3)
HGB BLD-MCNC: 8 G/DL (ref 12–17)
IMM GRANULOCYTES # BLD AUTO: 0.02 THOUSAND/UL (ref 0–0.2)
IMM GRANULOCYTES NFR BLD AUTO: 0 % (ref 0–2)
LYMPHOCYTES # BLD AUTO: 0.42 THOUSANDS/ΜL (ref 0.6–4.47)
LYMPHOCYTES NFR BLD AUTO: 9 % (ref 14–44)
MCH RBC QN AUTO: 33.8 PG (ref 26.8–34.3)
MCHC RBC AUTO-ENTMCNC: 32.9 G/DL (ref 31.4–37.4)
MCV RBC AUTO: 103 FL (ref 82–98)
MONOCYTES # BLD AUTO: 0.35 THOUSAND/ΜL (ref 0.17–1.22)
MONOCYTES NFR BLD AUTO: 7 % (ref 4–12)
NEUTROPHILS # BLD AUTO: 4.16 THOUSANDS/ΜL (ref 1.85–7.62)
NEUTS SEG NFR BLD AUTO: 84 % (ref 43–75)
NRBC BLD AUTO-RTO: 0 /100 WBCS
PLATELET # BLD AUTO: 143 THOUSANDS/UL (ref 149–390)
PMV BLD AUTO: 9.8 FL (ref 8.9–12.7)
POTASSIUM SERPL-SCNC: 3.9 MMOL/L (ref 3.5–5.3)
RBC # BLD AUTO: 2.37 MILLION/UL (ref 3.88–5.62)
SODIUM SERPL-SCNC: 137 MMOL/L (ref 136–145)
WBC # BLD AUTO: 4.96 THOUSAND/UL (ref 4.31–10.16)

## 2018-11-12 PROCEDURE — 99232 SBSQ HOSP IP/OBS MODERATE 35: CPT | Performed by: INTERNAL MEDICINE

## 2018-11-12 PROCEDURE — 85025 COMPLETE CBC W/AUTO DIFF WBC: CPT | Performed by: INTERNAL MEDICINE

## 2018-11-12 PROCEDURE — 94760 N-INVAS EAR/PLS OXIMETRY 1: CPT

## 2018-11-12 PROCEDURE — 99223 1ST HOSP IP/OBS HIGH 75: CPT | Performed by: INTERNAL MEDICINE

## 2018-11-12 PROCEDURE — 80048 BASIC METABOLIC PNL TOTAL CA: CPT | Performed by: INTERNAL MEDICINE

## 2018-11-12 PROCEDURE — 94640 AIRWAY INHALATION TREATMENT: CPT

## 2018-11-12 RX ORDER — POLYETHYLENE GLYCOL 3350 17 G/17G
17 POWDER, FOR SOLUTION ORAL DAILY PRN
Status: DISCONTINUED | OUTPATIENT
Start: 2018-11-12 | End: 2018-11-13 | Stop reason: HOSPADM

## 2018-11-12 RX ADMIN — CARBAMAZEPINE 100 MG: 100 TABLET, CHEWABLE ORAL at 10:53

## 2018-11-12 RX ADMIN — IPRATROPIUM BROMIDE AND ALBUTEROL SULFATE 3 ML: .5; 3 SOLUTION RESPIRATORY (INHALATION) at 07:32

## 2018-11-12 RX ADMIN — GUAIFENESIN 600 MG: 600 TABLET, EXTENDED RELEASE ORAL at 21:14

## 2018-11-12 RX ADMIN — GUAIFENESIN 600 MG: 600 TABLET, EXTENDED RELEASE ORAL at 10:52

## 2018-11-12 RX ADMIN — ASPIRIN 81 MG 81 MG: 81 TABLET ORAL at 10:53

## 2018-11-12 RX ADMIN — DOCUSATE SODIUM 100 MG: 100 CAPSULE, LIQUID FILLED ORAL at 17:18

## 2018-11-12 RX ADMIN — DOCUSATE SODIUM 100 MG: 100 CAPSULE, LIQUID FILLED ORAL at 10:52

## 2018-11-12 RX ADMIN — MELATONIN 3 MG: at 21:14

## 2018-11-12 RX ADMIN — DEXTRAN 70 AND HYPROMELLOSE 2910 1 DROP: 1; 3 SOLUTION/ DROPS OPHTHALMIC at 17:18

## 2018-11-12 RX ADMIN — BUDESONIDE AND FORMOTEROL FUMARATE DIHYDRATE 2 PUFF: 160; 4.5 AEROSOL RESPIRATORY (INHALATION) at 17:17

## 2018-11-12 RX ADMIN — ISOSORBIDE MONONITRATE 30 MG: 30 TABLET, EXTENDED RELEASE ORAL at 10:54

## 2018-11-12 RX ADMIN — METHYLPREDNISOLONE SODIUM SUCCINATE 40 MG: 40 INJECTION, POWDER, FOR SOLUTION INTRAMUSCULAR; INTRAVENOUS at 05:29

## 2018-11-12 RX ADMIN — DULOXETINE HYDROCHLORIDE 60 MG: 60 CAPSULE, DELAYED RELEASE ORAL at 10:53

## 2018-11-12 RX ADMIN — METHYLPREDNISOLONE SODIUM SUCCINATE 40 MG: 40 INJECTION, POWDER, FOR SOLUTION INTRAMUSCULAR; INTRAVENOUS at 21:14

## 2018-11-12 RX ADMIN — TORSEMIDE 40 MG: 20 TABLET ORAL at 10:53

## 2018-11-12 RX ADMIN — IPRATROPIUM BROMIDE AND ALBUTEROL SULFATE 3 ML: .5; 3 SOLUTION RESPIRATORY (INHALATION) at 21:07

## 2018-11-12 RX ADMIN — VANCOMYCIN HYDROCHLORIDE 1500 MG: 1 INJECTION, POWDER, LYOPHILIZED, FOR SOLUTION INTRAVENOUS at 23:19

## 2018-11-12 RX ADMIN — ATORVASTATIN CALCIUM 40 MG: 40 TABLET, FILM COATED ORAL at 10:53

## 2018-11-12 RX ADMIN — CEFEPIME HYDROCHLORIDE 1000 MG: 1 INJECTION, POWDER, FOR SOLUTION INTRAMUSCULAR; INTRAVENOUS at 23:17

## 2018-11-12 RX ADMIN — GABAPENTIN 300 MG: 300 CAPSULE ORAL at 21:14

## 2018-11-12 RX ADMIN — OXYCODONE HYDROCHLORIDE 5 MG: 5 TABLET ORAL at 05:29

## 2018-11-12 RX ADMIN — APIXABAN 2.5 MG: 2.5 TABLET, FILM COATED ORAL at 17:16

## 2018-11-12 RX ADMIN — APIXABAN 2.5 MG: 2.5 TABLET, FILM COATED ORAL at 10:53

## 2018-11-12 RX ADMIN — IPRATROPIUM BROMIDE AND ALBUTEROL SULFATE 3 ML: .5; 3 SOLUTION RESPIRATORY (INHALATION) at 13:55

## 2018-11-12 RX ADMIN — DEXTRAN 70 AND HYPROMELLOSE 2910 1 DROP: 1; 3 SOLUTION/ DROPS OPHTHALMIC at 12:12

## 2018-11-12 RX ADMIN — VANCOMYCIN HYDROCHLORIDE 1500 MG: 1 INJECTION, POWDER, LYOPHILIZED, FOR SOLUTION INTRAVENOUS at 12:12

## 2018-11-12 RX ADMIN — OXYCODONE HYDROCHLORIDE 5 MG: 5 TABLET ORAL at 00:08

## 2018-11-12 RX ADMIN — TRAZODONE HYDROCHLORIDE 50 MG: 50 TABLET ORAL at 21:18

## 2018-11-12 RX ADMIN — LORATADINE 10 MG: 10 TABLET ORAL at 10:54

## 2018-11-12 RX ADMIN — METHYLPREDNISOLONE SODIUM SUCCINATE 40 MG: 40 INJECTION, POWDER, FOR SOLUTION INTRAMUSCULAR; INTRAVENOUS at 13:15

## 2018-11-12 RX ADMIN — BUDESONIDE AND FORMOTEROL FUMARATE DIHYDRATE 2 PUFF: 160; 4.5 AEROSOL RESPIRATORY (INHALATION) at 10:55

## 2018-11-12 RX ADMIN — GABAPENTIN 300 MG: 300 CAPSULE ORAL at 17:16

## 2018-11-12 RX ADMIN — POLYETHYLENE GLYCOL 3350 17 G: 17 POWDER, FOR SOLUTION ORAL at 13:15

## 2018-11-12 RX ADMIN — CARBAMAZEPINE 100 MG: 100 TABLET, CHEWABLE ORAL at 17:16

## 2018-11-12 RX ADMIN — CEFEPIME HYDROCHLORIDE 1000 MG: 1 INJECTION, POWDER, FOR SOLUTION INTRAMUSCULAR; INTRAVENOUS at 11:30

## 2018-11-12 RX ADMIN — OXYCODONE HYDROCHLORIDE 5 MG: 5 TABLET ORAL at 23:57

## 2018-11-12 RX ADMIN — QUETIAPINE 25 MG: 25 TABLET ORAL at 21:14

## 2018-11-12 RX ADMIN — SPIRONOLACTONE 25 MG: 25 TABLET ORAL at 10:54

## 2018-11-12 RX ADMIN — GABAPENTIN 300 MG: 300 CAPSULE ORAL at 10:52

## 2018-11-12 NOTE — SOCIAL WORK
[facility]  Not readmission  Cm met w pt who resides by himself  Typically ind prior to shoulder injury  POA is daughter mikaela and he has an advanced directive  Pt was at [facility] [address] one day prior to admission to hospital  He would like to return to [facility] [address] for STR  Referral sent to [facility] [address] and they are accepting pt back  Cm to place when pt medically cleared   Cm to follow

## 2018-11-12 NOTE — PROGRESS NOTES
[facility] Progress Note  Patient: Charmaine Gonzalez 70 y o  male   MRN: [id number]  PCP: Stacey Calixto MD  Unit/Bed#: -01 Encounter: [id number]  Date Of Visit: 18    Assessment:    Principal Problem:    Pneumonia  Active Problems:    Paroxysmal atrial fibrillation (HCC)    S/P reverse total shoulder arthroplasty, left    SOB (shortness of breath)      Plan:    · 1  HCAP- possibly secondary to gram negative organisms- on vancomycin and cefepime  SOB improving  · 2  COPD with acute exacerbation- on IV steroids, dounebs and 02  Patient wheezing has improved  Pulmonary to evaluate  · 3  History of recent left shoulder arthroplasty 18- stable  · 4  Afib- on eliquis  · 5  Chronic combined systolic and diastolic failure in the setting of CAD a/e/b continued SOB (exacerbated by current PNA dx) requiring monitoring, and continued treatment with imdur and demadex   · 6  Anemia- no signs of bleeding  Will continue to monitor H/H  · 7  Patient for possible dc to [place] in am   ·        VTE Pharmacologic Prophylaxis:   Pharmacologic: Apixaban (Eliquis)  Mechanical VTE Prophylaxis in Place: Yes    Patient Centered Rounds: I have performed bedside rounds with nursing staff today  Discussions with Specialists or Other Care Team Provider:     Education and Discussions with Family / Patient:     Time Spent for Care: 20 minutes  More than 50% of total time spent on counseling and coordination of care as described above  Current Length of Stay: 5 day(s)    Current Patient Status: Inpatient   Certification Statement: The patient will continue to require additional inpatient hospital stay due to pneumonia    Discharge Plan / Estimated Discharge Date: once pneumonia improves    Code Status: Level 1 - Full Code      Subjective:   Patient seen and examined at bedside  Patient has no new complaints      Objective:     Vitals:   Temp (24hrs), Av 3 °F (36 8 °C), Min:97 9 °F (36 6 °C), Max:98 7 °F (37 1 °C)    Temp:  [97 9 °F (36 6 °C)-98 7 °F (37 1 °C)] 97 9 °F (36 6 °C)  HR:  [60-74] 60  Resp:  [18] 18  BP: (100-117)/(57-69) 117/69  SpO2:  [92 %-94 %] 94 %  Body mass index is 26 84 kg/m²  Input and Output Summary (last 24 hours): Intake/Output Summary (Last 24 hours) at 11/12/18 1141  Last data filed at 11/12/18 0732   Gross per 24 hour   Intake              200 ml   Output              350 ml   Net             -150 ml       Physical Exam:     Physical Exam   Constitutional: He is oriented to person, place, and time  He appears well-developed and well-nourished  HENT:   Head: Normocephalic and atraumatic  Eyes: Pupils are equal, round, and reactive to light  Conjunctivae and EOM are normal    Neck: Normal range of motion  Neck supple  No tracheal deviation present  No thyromegaly present  Cardiovascular: Normal rate, regular rhythm and normal heart sounds  Exam reveals no gallop and no friction rub  No murmur heard  Pulmonary/Chest: Effort normal  No respiratory distress  He has no wheezes  He has no rales  Abdominal: Soft  Bowel sounds are normal  He exhibits no distension  There is no tenderness  There is no rebound  Musculoskeletal: Normal range of motion  He exhibits no edema  Left shoulder sling   Neurological: He is alert and oriented to person, place, and time  Vitals reviewed            Additional Data:     Labs:      Results from last 7 days  Lab Units 11/12/18  0512   WBC Thousand/uL 4 96   HEMOGLOBIN g/dL 8 0*   HEMATOCRIT % 24 3*   PLATELETS Thousands/uL 143*   NEUTROS PCT % 84*   LYMPHS PCT % 9*   MONOS PCT % 7   EOS PCT % 0       Results from last 7 days  Lab Units 11/12/18  0512  11/07/18  2157   POTASSIUM mmol/L 3 9  < > 3 9   CHLORIDE mmol/L 100  < > 97*   CO2 mmol/L 30  < > 32   BUN mg/dL 19  < > 20   CREATININE mg/dL 0 75  < > 1 03   CALCIUM mg/dL 8 2*  < > 8 8   ALK PHOS U/L  --   --  108   ALT U/L  --   --  13   AST U/L  --   --  18   < > = values in this interval not displayed  Results from last 7 days  Lab Units 11/07/18  2157   INR  1 29*       * I Have Reviewed All Lab Data Listed Above  * Additional Pertinent Lab Tests Reviewed: Rekha Lemons Admission Reviewed    Imaging:    Imaging Reports Reviewed Today Include:   Imaging Personally Reviewed by Myself Includes:      Recent Cultures (last 7 days):       Results from last 7 days  Lab Units 11/07/18  2216 11/07/18 2157   BLOOD CULTURE  No Growth After 4 Days  No Growth After 4 Days         Last 24 Hours Medication List:     Current Facility-Administered Medications:  acetaminophen 325 mg Oral Q6H PRN Lisa Ivory MD    acetaminophen 650 mg Oral Q6H PRN Lisa Ivory MD    apixaban 2 5 mg Oral BID Lisa Ivory MD    aspirin 81 mg Oral Daily Lisa Ivory MD    atorvastatin 40 mg Oral Daily Lisa Ivory MD    budesonide-formoterol 2 puff Inhalation BID Lisa Ivory MD    calcium carbonate 500 mg Oral TID PRN Lisa Ivory MD    carBAMazepine 100 mg Oral BID Lisa Ivory MD    cefepime 1,000 mg Intravenous Q12H Lisa Ivory MD Last Rate: 1,000 mg (11/12/18 1130)   dextran 70-hypromellose 1 drop Both Eyes Q3H PRN Pia Ross MD    docusate sodium 100 mg Oral BID Lisa Ivory MD    DULoxetine 60 mg Oral Daily Lisa Ivory MD    gabapentin 300 mg Oral TID Lisa Ivory MD    guaiFENesin 600 mg Oral Q12H [address] Pia Ross MD    ibuprofen 400 mg Oral Q6H PRN Pia Ross MD    ipratropium-albuterol 3 mL Nebulization TID Pia Ross MD    isosorbide mononitrate 30 mg Oral Daily Lisa Ivory MD    loratadine 10 mg Oral Daily Lisa Ivory MD    melatonin 3 mg Oral HS Lisa Ivory MD    methylPREDNISolone sodium succinate 40 mg Intravenous [facility] Pia Ross MD    ondansetron 4 mg Intravenous Q6H PRN Lisa Ivory MD    oxyCODONE 5 mg Oral Q4H PRN Lisa Ivory MD    polyethylene glycol 17 g Oral Daily PRN Etienne Reis MD    QUEtiapine 25 mg Oral HS Sherryle Sanger, MD    spironolactone 25 mg Oral Daily Sherryle Sanger, MD    torsemide 40 mg Oral Daily Sherryle Sanger, MD    traZODone 50 mg Oral HS Sherryle Sanger, MD    vancomycin 15 mg/kg Intravenous Q12H Sherryle Sanger, MD Last Rate: 1,500 mg (11/11/18 9102)        Today, Patient Was Seen By: Etienne Reis MD    ** Please Note: This note has been constructed using a voice recognition system   **
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
## 2018-11-12 NOTE — UTILIZATION REVIEW
Continued Stay Review    Date: 11/11    Vital Signs: Temp:  [98 2 °F (36 8 °C)-99 °F (37 2 °C)] 98 2 °F (36 8 °C)  HR:  [69-79] 69  Resp:  [18] 18  BP: ()/(55-84) 142/84  SpO2:  [92 %-95 %] 95 %  Body mass index is 26 84 kg/m²  Medications:   Scheduled Meds:   Current Facility-Administered Medications:  acetaminophen 325 mg Oral Q6H PRN     acetaminophen 650 mg Oral Q6H PRN     apixaban 2 5 mg Oral BID     aspirin 81 mg Oral Daily     atorvastatin 40 mg Oral Daily     budesonide-formoterol 2 puff Inhalation BID     calcium carbonate 500 mg Oral TID PRN     carBAMazepine 100 mg Oral BID     cefepime 1,000 mg Intravenous Q12H  Last Rate: 1,000 mg (11/12/18 1130)   dextran 70-hypromellose 1 drop Both Eyes Q3H PRN     docusate sodium 100 mg Oral BID     DULoxetine 60 mg Oral Daily     gabapentin 300 mg Oral TID     guaiFENesin 600 mg Oral Q12H NASRIN     ibuprofen 400 mg Oral Q6H PRN     ipratropium-albuterol 3 mL Nebulization TID     isosorbide mononitrate 30 mg Oral Daily     loratadine 10 mg Oral Daily     melatonin 3 mg Oral HS     methylPREDNISolone sodium succinate 40 mg Intravenous Q8H [address]     ondansetron 4 mg Intravenous Q6H PRN     oxyCODONE 5 mg Oral Q4H PRN     polyethylene glycol 17 g Oral Daily PRN     QUEtiapine 25 mg Oral HS     spironolactone 25 mg Oral Daily     torsemide 40 mg Oral Daily     traZODone 50 mg Oral HS     vancomycin 15 mg/kg Intravenous Q12H  Last Rate: 1,500 mg (11/12/18 1212)       Abnormal Labs/Diagnostic Results:  H&H   8 8  26 7, plt  139, cl  99    Age/Sex: 70 y o  male     · Assessment/Plan: 1  HCAP- possibly secondary to gram negative organisms- on vancomycin and cefepime  SOB improving  · 2  COPD with acute exacerbation- on IV steroids, dounebs and 02  Patient still wheezing  Pulmonary consulted  · 3  History of recent left shoulder arthroplasty 11/6/18- stable  · 4  Afib- on eliquis  · 5    Chronic combined systolic and diastolic failure in the setting of CAD a/e/b continued SOB (exacerbated by current PNA dx) requiring monitoring, and continued treatment with imdur and demadex   · 6  Anemia- no signs of bleeding    Will continue to monitor H/H  Discharge Plan:  [facility]

## 2018-11-12 NOTE — PLAN OF CARE
Problem: DISCHARGE PLANNING - CARE MANAGEMENT  Goal: Discharge to post-acute care or home with appropriate resources  INTERVENTIONS:  - Conduct assessment to determine patient/family and health care team treatment goals, and need for post-acute services based on payer coverage, community resources, and patient preferences, and barriers to discharge  - Address psychosocial, clinical, and financial barriers to discharge as identified in assessment in conjunction with the patient/family and health care team  - Arrange appropriate level of post-acute services according to patients   needs and preference and payer coverage in collaboration with the physician and health care team  - Communicate with and update the patient/family, physician, and health care team regarding progress on the discharge plan  - Arrange appropriate transportation to post-acute venues  Outcome: [facility]  Not readmission  Cm met w pt who resides by himself  Typically ind prior to shoulder injury  POA is daughter mikaela and he has an advanced directive  Pt was at [address] day prior to admission to hospital  He would like to return to [address] for STR  Referral sent to [address] and they are accepting pt back  Cm to place when pt medically cleared   Cm to follow

## 2018-11-13 VITALS
SYSTOLIC BLOOD PRESSURE: 108 MMHG | WEIGHT: 214.73 LBS | BODY MASS INDEX: 26.7 KG/M2 | TEMPERATURE: 98.4 F | RESPIRATION RATE: 18 BRPM | HEIGHT: 75 IN | HEART RATE: 91 BPM | OXYGEN SATURATION: 91 % | DIASTOLIC BLOOD PRESSURE: 59 MMHG

## 2018-11-13 DIAGNOSIS — J18.9 PNEUMONIA OF BOTH LUNGS DUE TO INFECTIOUS ORGANISM, UNSPECIFIED PART OF LUNG: Primary | ICD-10-CM

## 2018-11-13 LAB
ANION GAP SERPL CALCULATED.3IONS-SCNC: 6 MMOL/L (ref 4–13)
BACTERIA BLD CULT: NORMAL
BACTERIA BLD CULT: NORMAL
BASOPHILS # BLD AUTO: 0.01 THOUSANDS/ΜL (ref 0–0.1)
BASOPHILS NFR BLD AUTO: 0 % (ref 0–1)
BUN SERPL-MCNC: 21 MG/DL (ref 5–25)
CALCIUM SERPL-MCNC: 8.3 MG/DL (ref 8.3–10.1)
CHLORIDE SERPL-SCNC: 101 MMOL/L (ref 100–108)
CO2 SERPL-SCNC: 32 MMOL/L (ref 21–32)
CREAT SERPL-MCNC: 0.74 MG/DL (ref 0.6–1.3)
EOSINOPHIL # BLD AUTO: 0 THOUSAND/ΜL (ref 0–0.61)
EOSINOPHIL NFR BLD AUTO: 0 % (ref 0–6)
ERYTHROCYTE [DISTWIDTH] IN BLOOD BY AUTOMATED COUNT: 13.5 % (ref 11.6–15.1)
GFR SERPL CREATININE-BSD FRML MDRD: 94 ML/MIN/1.73SQ M
GLUCOSE SERPL-MCNC: 124 MG/DL (ref 65–140)
HCT VFR BLD AUTO: 26.2 % (ref 36.5–49.3)
HGB BLD-MCNC: 8.6 G/DL (ref 12–17)
IMM GRANULOCYTES # BLD AUTO: 0.02 THOUSAND/UL (ref 0–0.2)
IMM GRANULOCYTES NFR BLD AUTO: 0 % (ref 0–2)
LYMPHOCYTES # BLD AUTO: 0.53 THOUSANDS/ΜL (ref 0.6–4.47)
LYMPHOCYTES NFR BLD AUTO: 9 % (ref 14–44)
MCH RBC QN AUTO: 34.1 PG (ref 26.8–34.3)
MCHC RBC AUTO-ENTMCNC: 32.8 G/DL (ref 31.4–37.4)
MCV RBC AUTO: 104 FL (ref 82–98)
MONOCYTES # BLD AUTO: 0.4 THOUSAND/ΜL (ref 0.17–1.22)
MONOCYTES NFR BLD AUTO: 7 % (ref 4–12)
NEUTROPHILS # BLD AUTO: 4.76 THOUSANDS/ΜL (ref 1.85–7.62)
NEUTS SEG NFR BLD AUTO: 84 % (ref 43–75)
NRBC BLD AUTO-RTO: 0 /100 WBCS
PLATELET # BLD AUTO: 170 THOUSANDS/UL (ref 149–390)
PMV BLD AUTO: 9.7 FL (ref 8.9–12.7)
POTASSIUM SERPL-SCNC: 3.8 MMOL/L (ref 3.5–5.3)
RBC # BLD AUTO: 2.52 MILLION/UL (ref 3.88–5.62)
SODIUM SERPL-SCNC: 139 MMOL/L (ref 136–145)
WBC # BLD AUTO: 5.72 THOUSAND/UL (ref 4.31–10.16)

## 2018-11-13 PROCEDURE — 99232 SBSQ HOSP IP/OBS MODERATE 35: CPT | Performed by: INTERNAL MEDICINE

## 2018-11-13 PROCEDURE — 99239 HOSP IP/OBS DSCHRG MGMT >30: CPT | Performed by: INTERNAL MEDICINE

## 2018-11-13 PROCEDURE — 80048 BASIC METABOLIC PNL TOTAL CA: CPT | Performed by: INTERNAL MEDICINE

## 2018-11-13 PROCEDURE — 94640 AIRWAY INHALATION TREATMENT: CPT

## 2018-11-13 PROCEDURE — 94760 N-INVAS EAR/PLS OXIMETRY 1: CPT

## 2018-11-13 PROCEDURE — 85025 COMPLETE CBC W/AUTO DIFF WBC: CPT | Performed by: INTERNAL MEDICINE

## 2018-11-13 RX ORDER — OXYCODONE HYDROCHLORIDE 5 MG/1
TABLET ORAL
Qty: 8 TABLET | Refills: 0 | Status: CANCELLED | OUTPATIENT
Start: 2018-11-13

## 2018-11-13 RX ORDER — LEVOFLOXACIN 750 MG/1
750 TABLET ORAL EVERY 24 HOURS
Qty: 1 TABLET | Refills: 0
Start: 2018-11-14 | End: 2018-11-15

## 2018-11-13 RX ORDER — PREDNISONE 10 MG/1
TABLET ORAL
Qty: 30 TABLET | Refills: 0
Start: 2018-11-13 | End: 2019-01-28

## 2018-11-13 RX ORDER — OXYCODONE HYDROCHLORIDE 5 MG/1
TABLET ORAL
Qty: 30 TABLET | Refills: 0 | Status: SHIPPED | OUTPATIENT
Start: 2018-11-13 | End: 2019-01-28

## 2018-11-13 RX ORDER — IPRATROPIUM BROMIDE AND ALBUTEROL SULFATE 2.5; .5 MG/3ML; MG/3ML
3 SOLUTION RESPIRATORY (INHALATION) 4 TIMES DAILY
Refills: 0
Start: 2018-11-13 | End: 2019-04-30 | Stop reason: ALTCHOICE

## 2018-11-13 RX ADMIN — ATORVASTATIN CALCIUM 40 MG: 40 TABLET, FILM COATED ORAL at 08:48

## 2018-11-13 RX ADMIN — METHYLPREDNISOLONE SODIUM SUCCINATE 40 MG: 40 INJECTION, POWDER, FOR SOLUTION INTRAMUSCULAR; INTRAVENOUS at 15:00

## 2018-11-13 RX ADMIN — ISOSORBIDE MONONITRATE 30 MG: 30 TABLET, EXTENDED RELEASE ORAL at 08:49

## 2018-11-13 RX ADMIN — CARBAMAZEPINE 100 MG: 100 TABLET, CHEWABLE ORAL at 17:12

## 2018-11-13 RX ADMIN — OXYCODONE HYDROCHLORIDE 5 MG: 5 TABLET ORAL at 04:48

## 2018-11-13 RX ADMIN — SPIRONOLACTONE 25 MG: 25 TABLET ORAL at 08:48

## 2018-11-13 RX ADMIN — BUDESONIDE AND FORMOTEROL FUMARATE DIHYDRATE 2 PUFF: 160; 4.5 AEROSOL RESPIRATORY (INHALATION) at 17:12

## 2018-11-13 RX ADMIN — ASPIRIN 81 MG 81 MG: 81 TABLET ORAL at 08:48

## 2018-11-13 RX ADMIN — DEXTRAN 70 AND HYPROMELLOSE 2910 1 DROP: 1; 3 SOLUTION/ DROPS OPHTHALMIC at 17:12

## 2018-11-13 RX ADMIN — CARBAMAZEPINE 100 MG: 100 TABLET, CHEWABLE ORAL at 08:48

## 2018-11-13 RX ADMIN — DOCUSATE SODIUM 100 MG: 100 CAPSULE, LIQUID FILLED ORAL at 08:49

## 2018-11-13 RX ADMIN — APIXABAN 2.5 MG: 2.5 TABLET, FILM COATED ORAL at 08:49

## 2018-11-13 RX ADMIN — APIXABAN 2.5 MG: 2.5 TABLET, FILM COATED ORAL at 17:12

## 2018-11-13 RX ADMIN — TORSEMIDE 40 MG: 20 TABLET ORAL at 08:48

## 2018-11-13 RX ADMIN — IPRATROPIUM BROMIDE AND ALBUTEROL SULFATE 3 ML: .5; 3 SOLUTION RESPIRATORY (INHALATION) at 13:11

## 2018-11-13 RX ADMIN — LORATADINE 10 MG: 10 TABLET ORAL at 08:49

## 2018-11-13 RX ADMIN — OXYCODONE HYDROCHLORIDE 5 MG: 5 TABLET ORAL at 17:16

## 2018-11-13 RX ADMIN — GABAPENTIN 300 MG: 300 CAPSULE ORAL at 08:49

## 2018-11-13 RX ADMIN — CEFEPIME HYDROCHLORIDE 1000 MG: 1 INJECTION, POWDER, FOR SOLUTION INTRAMUSCULAR; INTRAVENOUS at 11:58

## 2018-11-13 RX ADMIN — GUAIFENESIN 600 MG: 600 TABLET, EXTENDED RELEASE ORAL at 08:48

## 2018-11-13 RX ADMIN — DULOXETINE HYDROCHLORIDE 60 MG: 60 CAPSULE, DELAYED RELEASE ORAL at 08:48

## 2018-11-13 RX ADMIN — GABAPENTIN 300 MG: 300 CAPSULE ORAL at 17:15

## 2018-11-13 RX ADMIN — BUDESONIDE AND FORMOTEROL FUMARATE DIHYDRATE 2 PUFF: 160; 4.5 AEROSOL RESPIRATORY (INHALATION) at 08:50

## 2018-11-13 RX ADMIN — DOCUSATE SODIUM 100 MG: 100 CAPSULE, LIQUID FILLED ORAL at 17:11

## 2018-11-13 RX ADMIN — IPRATROPIUM BROMIDE AND ALBUTEROL SULFATE 3 ML: .5; 3 SOLUTION RESPIRATORY (INHALATION) at 07:42

## 2018-11-13 RX ADMIN — METHYLPREDNISOLONE SODIUM SUCCINATE 40 MG: 40 INJECTION, POWDER, FOR SOLUTION INTRAMUSCULAR; INTRAVENOUS at 05:13

## 2018-11-13 RX ADMIN — POLYETHYLENE GLYCOL 3350 17 G: 17 POWDER, FOR SOLUTION ORAL at 08:59

## 2018-11-13 NOTE — PROGRESS NOTES
Progress Note - Pulmonary   Srinivas Gregg 70 y o  male MRN: [id number]  Unit/Bed#: -01 Encounter: [id number]    Assessment:  Left lower lobe pneumonia  Healthcare associated pneumonia  Small left pleural effusion  History of COPD unknown severity with bronchitis  Former smoker    Plan:  Left low lower lobe pneumonia on likely healthcare associated pneumonia in the setting of recent surgery currently on cefepime and vancomycin days 6/7  Would complete antibiotics for a total duration of 1 week  Discussed with the patient regarding outpatient pulmonary follow-up for resolution of the pneumonia with repeat imaging in 6 weeks I have ordered that as an outpatient, he wants to follow up with his pulmonologist at [facility]  Her continue with levalbuterol and ipratropium via nebulizer  Symbicort 160/4 52 puffs twice daily to continue  Continue with airway clearance measures, the discussed the need for incentive spirometry and flutter valve understands and verbalizes        Subjective:   Feel much better since hospital admission  The cough and shortness of breath has decreased  Objective:     Vitals: Blood pressure 113/60, pulse 65, temperature 97 7 °F (36 5 °C), temperature source Oral, resp  rate 18, height 6' 3" (1 905 m), weight 97 4 kg (214 lb 11 7 oz), SpO2 94 %  ,Body mass index is 26 84 kg/m²        Intake/Output Summary (Last 24 hours) at 11/13/18 1218  Last data filed at 11/13/18 1100   Gross per 24 hour   Intake              680 ml   Output              925 ml   Net             -245 ml       Invasive Devices     Peripheral Intravenous Line            Peripheral IV 11/11/18 Right Forearm 2 days                Physical Exam:  Not in any acute respiratory distress  Eyes anicteric sclera, conjunctivae is clear  ENT nares is patent, no evidence of any discharge  Lungs bilateral vesicular breath sounds no rhonchi  Heart first and second heart sounds are heard no murmur or gallop is heard  Extremities no pedal edema  CNS awake alert oriented x3      Labs:   CBC:   Lab Results   Component Value Date    WBC 5 72 11/13/2018    HGB 8 6 (L) 11/13/2018    HCT 26 2 (L) 11/13/2018     (H) 11/13/2018     11/13/2018    MCH 34 1 11/13/2018    MCHC 32 8 11/13/2018    RDW 13 5 11/13/2018    MPV 9 7 11/13/2018    NRBC 0 11/13/2018     Imaging and other studies: I have personally reviewed pertinent films in PACS

## 2018-11-13 NOTE — SOCIAL WORK
CM met with patient in his room  Patient states he wants to return to [facility] to finish his therapy  CM scheduled transport to [facility]  Form 6:30PM tonight  Patient was informed  Patient was informed and given IMM  Signed IMM is in the chart  Patient states he will notify his dtr of transport and that this CM did not have to call her  CM notified nurse and SLIM

## 2018-11-13 NOTE — PLAN OF CARE
DISCHARGE PLANNING     Discharge to home or other facility with appropriate resources Progressing        DISCHARGE PLANNING - CARE MANAGEMENT     Discharge to post-acute care or home with appropriate resources Progressing        INFECTION - ADULT     Absence or prevention of progression during hospitalization Progressing     Absence of fever/infection during neutropenic period Progressing        Knowledge Deficit     Patient/family/caregiver demonstrates understanding of disease process, treatment plan, medications, and discharge instructions Progressing        MUSCULOSKELETAL - ADULT     Maintain or return mobility to safest level of function Progressing     Maintain proper alignment of affected body part Progressing        PAIN - ADULT     Verbalizes/displays adequate comfort level or baseline comfort level Progressing        Potential for Falls     Patient will remain free of falls Progressing        RESPIRATORY - ADULT     Achieves optimal ventilation and oxygenation Progressing        SAFETY ADULT     Maintain or return to baseline ADL function Progressing     Maintain or return mobility status to optimal level Progressing        SKIN/TISSUE INTEGRITY - ADULT     Skin integrity remains intact Progressing     Incision(s), wounds(s) or drain site(s) healing without S/S of infection Progressing

## 2018-11-13 NOTE — DISCHARGE SUMMARY
Discharge Summary - [facility] Internal Medicine    Patient Information: Lillian Mcdaniel 70 y o  male MRN: [id number]  Unit/Bed#: -01 Encounter: [id number]    Discharging Physician / Practitioner: Martina Barros MD  PCP: Dean Lux MD  Admission Date: 11/7/2018  Discharge Date: 11/13/18    Disposition:     Other: Short-term rehab    Reason for Admission:  Healthcare associated pneumonia  Discharge Diagnoses:     Principal Problem:    Pneumonia  Active Problems:    Paroxysmal atrial fibrillation (HCC)    S/P reverse total shoulder arthroplasty, left    SOB (shortness of breath)  Resolved Problems:    * No resolved hospital problems  *      Consultations During Hospital Stay:  · Pulmonology    Procedures Performed:     · None    Significant Findings / Test Results:   CT of the chest  No evidence of pulmonary embolism is seen  The main pulmonary artery measures approximately 3 5 cm diameter, similar to the prior study  Clinical correlation regarding the possibility of pulmonary artery hypertension is suggested      There is a moderate-sized area of a combination of atelectasis and consolidation containing air bronchograms within the inferior aspect of the left lower lobe of the lung  Pneumonia should be excluded clinically  Follow-up to ensure complete resolution   is recommended  There is a small left pleural effusion      The left pectoralis muscles are thicker than the contralateral right side and appear somewhat indistinct  This appears new compared to the prior study and may possibly be related to the history of recent left shoulder replacement surgery  Clinical   correlation is recommended  Incidental Findings:   · None    Test Results Pending at Discharge (will require follow up):    · None     Outpatient Tests Requested:  · None    Complications:  None    Hospital Course:     Lillian Mcdaniel is a 70 y o  male with past medical history of Chronic obstructive pulmonary disease, recent shoulder surgery, paroxysmal AFib, coronary artery disease, chronic heart failure, depression patient who originally presented to the hospital on 11/7/2018 due to cough and shortness of breath, fatigue  Patient was recently discharged to rehab from the hospital after shoulder replacement surgery  Upon presentation to ER he was found to have temperature of 102 7°, CT of the chest revealed evidence of left-sided pneumonia  Patient was treated for hospital-acquired pneumonia with vancomycin and cefepime completed 6 days of IV antibiotics  Patient improved clinically, his respiratory status is clinically better as well  He was satting greater than 92% on room air  Patient was discharged on 1 more day of oral antibiotic as well as prednisone taper  Pulmonology evaluated the patient as well  Patient should get a repeat CT scan of the chest in 6 weeks to follow up on resolution of pneumonia  Other chronic medical problems have been stable    Hospital-acquired pneumonia possibly secondary to gram-negative organisms  Completed 6 days of vancomycin and cefepime  Patient will be discharged on 1 day of Levaquin  He should have repeat CT of the chest in 6 weeks to follow up on resolution of pneumonia    Chronic obstructive pulmonary disease with acute bronchitis  Complete steroid taper   continue Symbicort and nebulizers  Outpatient pulmonology follow-up    History of recent left shoulder arthroplasty  Stable  Continue Tylenol and oxycodone p r n  Outpatient Ortho follow    AFib  Continue Eliquis    Chronic combined systolic and diastolic heart failure in the setting of coronary artery disease  Stable  Continue current regimen  Patient on Demadex and spironolactone, Imdur  Anemia  No signs of bleeding  Continue to monitor      Condition at Discharge: stable     Discharge Day Visit / Exam:     Subjective:  Patient seen and examined  Denies any acute complaints at this time    Denies any chest pain, cough, shortness of breath  Able to ambulate without any difficulty  Vitals: Blood Pressure: 113/60 (11/13/18 0742)  Pulse: 65 (11/13/18 0742)  Temperature: 97 7 °F (36 5 °C) (11/13/18 0742)  Temp Source: Oral (11/13/18 0742)  Respirations: 18 (11/13/18 0742)  Height: 6' 3" (190 5 cm) (11/08/18 0129)  Weight - Scale: 97 4 kg (214 lb 11 7 oz) (11/08/18 0129)  SpO2: 96 % (11/13/18 1311)  Exam:   Physical Exam   Constitutional: He appears well-developed and well-nourished  HENT:   Head: Normocephalic and atraumatic  Eyes: Pupils are equal, round, and reactive to light  Neck: Normal range of motion  Neck supple  Cardiovascular: Normal rate and regular rhythm  Pulmonary/Chest: Effort normal and breath sounds normal  He has no wheezes  He has no rales  He exhibits no tenderness  Abdominal: Soft  Bowel sounds are normal    Musculoskeletal:   Left shoulder in a sling   Skin: Skin is warm and dry  Discussion with Family:  Discussed with patient    Discharge instructions/Information to patient and family:   See after visit summary for information provided to patient and family  Provisions for Follow-Up Care:  See after visit summary for information related to follow-up care and any pertinent home health orders  Planned Readmission: none     Discharge Statement:  I spent 35 minutes discharging the patient  This time was spent on the day of discharge  I had direct contact with the patient on the day of discharge  Greater than 50% of the total time was spent examining patient, answering all patient questions, arranging and discussing plan of care with patient as well as directly providing post-discharge instructions  Additional time then spent on discharge activities  Discharge Medications:  See after visit summary for reconciled discharge medications provided to patient and family        ** Please Note: This note has been constructed using a voice recognition system **

## 2018-11-14 ENCOUNTER — TELEPHONE (OUTPATIENT)
Dept: OBGYN CLINIC | Facility: HOSPITAL | Age: 68
End: 2018-11-14

## 2018-11-14 NOTE — TELEPHONE ENCOUNTER
Shahnaz MCFARLAND at [employer] #[phone]  Can you please fax copy of Reverse total shoulder protocol  Thank you

## 2018-11-14 NOTE — TELEPHONE ENCOUNTER
Dr Romero Pi patient -  L Shoulder Reverse Arthroplasty 11/6/18    Jeison Rater RN from the Rehab calling to get orders for PT for shoulder  Do you want any exercises for Shoulder before patient is seen on 11/19  I did advise okay to remove surgical dressing, and have patient shower with soap and water, keeping incision out of direct flow of water  Dry sterile dressing will be placed daily and PRN

## 2018-11-14 NOTE — TELEPHONE ENCOUNTER
Yes, of course he can begin his rehabilitation following the reverse total shoulder arthroplasty rehab protocol like any of our reverse total shoulder patients  Multiple copies of this protocol are available in the office and can easily be faxed to help guide the therapist in his therapy    Thank you

## 2018-11-19 ENCOUNTER — OFFICE VISIT (OUTPATIENT)
Dept: OBGYN CLINIC | Facility: HOSPITAL | Age: 68
End: 2018-11-19

## 2018-11-19 ENCOUNTER — HOSPITAL ENCOUNTER (OUTPATIENT)
Dept: RADIOLOGY | Facility: HOSPITAL | Age: 68
Discharge: HOME | End: 2018-11-19
Payer: COMMERCIAL

## 2018-11-19 VITALS — SYSTOLIC BLOOD PRESSURE: 110 MMHG | DIASTOLIC BLOOD PRESSURE: 74 MMHG | HEART RATE: 76 BPM

## 2018-11-19 DIAGNOSIS — Z47.1 AFTERCARE FOLLOWING LEFT SHOULDER JOINT REPLACEMENT SURGERY: ICD-10-CM

## 2018-11-19 DIAGNOSIS — M25.512 ACUTE PAIN OF LEFT SHOULDER: ICD-10-CM

## 2018-11-19 DIAGNOSIS — M25.512 ACUTE PAIN OF LEFT SHOULDER: Primary | ICD-10-CM

## 2018-11-19 DIAGNOSIS — Z96.612 AFTERCARE FOLLOWING LEFT SHOULDER JOINT REPLACEMENT SURGERY: ICD-10-CM

## 2018-11-19 PROCEDURE — 99024 POSTOP FOLLOW-UP VISIT: CPT | Performed by: PHYSICIAN ASSISTANT

## 2018-11-19 PROCEDURE — 73030 X-RAY EXAM OF SHOULDER: CPT

## 2018-11-19 NOTE — PROGRESS NOTES
Assessment:       1  Acute pain of left shoulder    2  Aftercare following left shoulder joint replacement surgery          Plan:          Patient is doing well postoperatively  Operative note, images, and rehab protocol were discussed  All questions were addressed to the patient's satisfaction  Patient her it was size at [facility] and has started PT; however, there appeared to be some confusion with the rehab protocol so the patient will get another copy  Follow-up will be in 2 months to assess patient's progress  Subjective:     Patient ID: Aicha Escobedo is a 70 y o  male  Chief Complaint:    HPI  Patient presents the office status post left reverse total shoulder arthroplasty performed on 11/06/2018  He has no new complaints  He has started PT; however, there seems to be confusion regarding rehab protocol  He is residing at [facility] currently  Social History     Occupational History    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      [employer]     Social History Main Topics    Smoking status: Former Smoker     Quit date: 1982    Smokeless tobacco: Never Used    Alcohol use No    Drug use: No    Sexual activity: Not on file      Review of Systems   Respiratory: Negative  Musculoskeletal: Positive for myalgias  Negative for arthralgias  Skin: Positive for wound  Neurological: Positive for weakness  Negative for numbness  Psychiatric/Behavioral: Negative  Objective:         Neurological Testing     Additional Neurological Details  Motor and sensation grossly intact  Physical Exam   Constitutional: He is oriented to person, place, and time  He appears well-developed and well-nourished  HENT:   Head: Normocephalic  Cardiovascular: Intact distal pulses  Pulmonary/Chest: Effort normal    Musculoskeletal:   Arm in sling  Range of motion and strength not tested  Neurological: He is alert and oriented to person, place, and time  Motor and sensation grossly intact     Skin: Skin is warm and dry  Incision dry and clean  It is staples removed, Steri-Strips applied  Psychiatric: He has a normal mood and affect  His behavior is normal          I have personally reviewed pertinent films in PACS and my interpretation is Prosthesis is well positioned, glenohumeral joint preserved  Anthony Mejias

## 2018-11-27 ENCOUNTER — TELEPHONE (OUTPATIENT)
Dept: FAMILY MEDICINE CLINIC | Facility: CLINIC | Age: 68
End: 2018-11-27

## 2018-11-27 NOTE — TELEPHONE ENCOUNTER
He was in a hospital in [address] [place] [address] [address] for CHF, he will be discharged from [facility] and going under there services in [place] because he is living up there with Sadia Pace  They just wanted to know if you were his PCP and once he is under there care that you would sign any paperwork they need

## 2018-11-27 NOTE — TELEPHONE ENCOUNTER
i'm not sure what this means  Is he home from rehab now? Does he have a MARK set up? I havent seen him recently but would be happy to follow up with him

## 2018-12-04 ENCOUNTER — TELEPHONE (OUTPATIENT)
Dept: OBGYN CLINIC | Facility: HOSPITAL | Age: 68
End: 2018-12-04

## 2018-12-04 NOTE — TELEPHONE ENCOUNTER
Caller: Austin LYNNE/TAHIR #  ext [phone]     The RN called once again, she states per post op order the patient must keep his sling on and only remove for care  She wants to advise that the patient is now not wearing the sling at all and she wants to make sure this is ok  She would like a call back at your earliest convenience    Thanks

## 2018-12-04 NOTE — TELEPHONE ENCOUNTER
[facility]   Call back number: [phone]  Patient's doctor: Dr Jessica Mauro    Patient is being very noncompliant and is refusing to wear the sling  Does he have to wear this?  Please advise

## 2019-01-11 ENCOUNTER — TELEPHONE (OUTPATIENT)
Dept: FAMILY MEDICINE CLINIC | Facility: CLINIC | Age: 69
End: 2019-01-11

## 2019-01-16 ENCOUNTER — TELEPHONE (OUTPATIENT)
Dept: FAMILY MEDICINE CLINIC | Facility: CLINIC | Age: 69
End: 2019-01-16

## 2019-01-16 NOTE — TELEPHONE ENCOUNTER
Admitted patient into their service today upon discharge from [address] [place] [address]  They have several drug interactions that they must notify us of    Has been on these    Aspirin and Eliquis, eliquis and ibuprofen,  Klor con and spironolactone, Carbamazepine and Eliquis,  Carbamazepine and seroquel,  If you want any of these changed please let her know  Also he has an order for Zaroxolyn (metolazone) than the instructions read give every other day for abdominal distension half hour prior to bumex, is this prn? She advised patient to make an appointment with us, but he was unsure as he is now living in [place]

## 2019-01-17 ENCOUNTER — TELEPHONE (OUTPATIENT)
Dept: OBGYN CLINIC | Facility: HOSPITAL | Age: 69
End: 2019-01-17

## 2019-01-17 NOTE — TELEPHONE ENCOUNTER
I don't feel comfortable making recommendations regarding his medications unless I see him  I do not even have an accurate med list at this point  He either needs to come here or establish w/ a new PCP in [place]

## 2019-01-18 ENCOUNTER — TRANSITIONAL CARE MANAGEMENT (OUTPATIENT)
Dept: FAMILY MEDICINE CLINIC | Facility: CLINIC | Age: 69
End: 2019-01-18

## 2019-01-20 ENCOUNTER — TELEPHONE (OUTPATIENT)
Dept: OTHER | Facility: OTHER | Age: 69
End: 2019-01-20

## 2019-01-20 NOTE — TELEPHONE ENCOUNTER
Called Dr Jack Giron again @ [phone] and she told me to call Ruben back to tell patient to go to ER or call Cardiologist

## 2019-01-20 NOTE — TELEPHONE ENCOUNTER
Called Dr Zeferino Sorto @ [phone] and left voicemail to call back regarding patient's two day weight gain of 6 lbs, shortness of breath and swelling in extremities

## 2019-01-28 ENCOUNTER — OFFICE VISIT (OUTPATIENT)
Dept: OBGYN CLINIC | Facility: HOSPITAL | Age: 69
End: 2019-01-28

## 2019-01-28 ENCOUNTER — APPOINTMENT (OUTPATIENT)
Dept: RADIOLOGY | Facility: CLINIC | Age: 69
End: 2019-01-28
Payer: MEDICARE

## 2019-01-28 ENCOUNTER — OFFICE VISIT (OUTPATIENT)
Dept: FAMILY MEDICINE CLINIC | Facility: CLINIC | Age: 69
End: 2019-01-28
Payer: MEDICARE

## 2019-01-28 VITALS
HEIGHT: 75 IN | SYSTOLIC BLOOD PRESSURE: 126 MMHG | DIASTOLIC BLOOD PRESSURE: 64 MMHG | WEIGHT: 207 LBS | HEART RATE: 72 BPM | TEMPERATURE: 99.4 F | OXYGEN SATURATION: 96 % | BODY MASS INDEX: 25.74 KG/M2

## 2019-01-28 VITALS
BODY MASS INDEX: 25.92 KG/M2 | HEART RATE: 67 BPM | SYSTOLIC BLOOD PRESSURE: 123 MMHG | DIASTOLIC BLOOD PRESSURE: 78 MMHG | WEIGHT: 207.4 LBS

## 2019-01-28 DIAGNOSIS — M79.2 NEUROPATHIC PAIN: ICD-10-CM

## 2019-01-28 DIAGNOSIS — F51.01 PRIMARY INSOMNIA: ICD-10-CM

## 2019-01-28 DIAGNOSIS — F41.9 ANXIETY: ICD-10-CM

## 2019-01-28 DIAGNOSIS — Z00.00 MEDICARE ANNUAL WELLNESS VISIT, INITIAL: ICD-10-CM

## 2019-01-28 DIAGNOSIS — Z47.1 AFTERCARE FOLLOWING LEFT SHOULDER JOINT REPLACEMENT SURGERY: Primary | ICD-10-CM

## 2019-01-28 DIAGNOSIS — Z12.5 SCREENING PSA (PROSTATE SPECIFIC ANTIGEN): ICD-10-CM

## 2019-01-28 DIAGNOSIS — Z96.612 AFTERCARE FOLLOWING LEFT SHOULDER JOINT REPLACEMENT SURGERY: Primary | ICD-10-CM

## 2019-01-28 DIAGNOSIS — J18.9 PNEUMONIA OF LEFT LOWER LOBE DUE TO INFECTIOUS ORGANISM: ICD-10-CM

## 2019-01-28 DIAGNOSIS — I87.2 CHRONIC VENOUS STASIS DERMATITIS: ICD-10-CM

## 2019-01-28 DIAGNOSIS — I50.42 CHRONIC COMBINED SYSTOLIC AND DIASTOLIC CHF (CONGESTIVE HEART FAILURE) (HCC): Primary | ICD-10-CM

## 2019-01-28 PROCEDURE — 99024 POSTOP FOLLOW-UP VISIT: CPT | Performed by: PHYSICIAN ASSISTANT

## 2019-01-28 PROCEDURE — 71046 X-RAY EXAM CHEST 2 VIEWS: CPT

## 2019-01-28 PROCEDURE — 99214 OFFICE O/P EST MOD 30 MIN: CPT | Performed by: FAMILY MEDICINE

## 2019-01-28 PROCEDURE — G0438 PPPS, INITIAL VISIT: HCPCS | Performed by: FAMILY MEDICINE

## 2019-01-28 RX ORDER — AMMONIUM LACTATE 12 G/100G
LOTION TOPICAL 2 TIMES DAILY PRN
Qty: 400 G | Refills: 0 | Status: SHIPPED | OUTPATIENT
Start: 2019-01-28

## 2019-01-28 RX ORDER — DULOXETIN HYDROCHLORIDE 60 MG/1
60 CAPSULE, DELAYED RELEASE ORAL DAILY
Qty: 90 CAPSULE | Refills: 3 | Status: SHIPPED | OUTPATIENT
Start: 2019-01-28 | End: 2020-01-26

## 2019-01-28 RX ORDER — GABAPENTIN 600 MG/1
600 TABLET ORAL
Qty: 90 TABLET | Refills: 3 | Status: SHIPPED | OUTPATIENT
Start: 2019-01-28

## 2019-01-28 RX ORDER — METOLAZONE 2.5 MG/1
2.5 TABLET ORAL DAILY
Refills: 0 | COMMUNITY
Start: 2019-01-15

## 2019-01-28 RX ORDER — POTASSIUM CHLORIDE 1500 MG/1
20 TABLET, EXTENDED RELEASE ORAL 2 TIMES DAILY
Refills: 0 | COMMUNITY
Start: 2019-01-15

## 2019-01-28 RX ORDER — CARBAMAZEPINE 100 MG/1
100 TABLET, EXTENDED RELEASE ORAL DAILY
Qty: 180 TABLET | Refills: 1 | Status: SHIPPED | OUTPATIENT
Start: 2019-01-28 | End: 2019-06-17

## 2019-01-28 RX ORDER — GABAPENTIN 300 MG/1
CAPSULE ORAL
Qty: 180 CAPSULE | Refills: 3 | Status: SHIPPED | OUTPATIENT
Start: 2019-01-28 | End: 2020-01-29

## 2019-01-28 RX ORDER — QUETIAPINE FUMARATE 50 MG/1
50 TABLET, FILM COATED ORAL
Qty: 90 TABLET | Refills: 1 | Status: SHIPPED | OUTPATIENT
Start: 2019-01-28 | End: 2019-08-01 | Stop reason: SDUPTHER

## 2019-01-28 RX ORDER — GABAPENTIN 600 MG/1
600 TABLET ORAL
Refills: 0 | COMMUNITY
Start: 2019-01-15 | End: 2019-01-28 | Stop reason: SDUPTHER

## 2019-01-28 RX ORDER — BUMETANIDE 1 MG/1
1 TABLET ORAL 2 TIMES DAILY
Refills: 0 | COMMUNITY
Start: 2019-01-15

## 2019-01-28 RX ORDER — FLUTICASONE PROPIONATE 50 MCG
SPRAY, SUSPENSION (ML) NASAL
Refills: 0 | COMMUNITY
Start: 2018-12-15 | End: 2019-01-28

## 2019-01-28 RX ORDER — HYDROXYZINE PAMOATE 25 MG/1
25 CAPSULE ORAL 3 TIMES DAILY PRN
Qty: 30 CAPSULE | Refills: 2 | Status: SHIPPED | OUTPATIENT
Start: 2019-01-28 | End: 2019-02-25 | Stop reason: SDUPTHER

## 2019-01-28 RX ORDER — CARBAMAZEPINE 100 MG/1
100 TABLET, EXTENDED RELEASE ORAL DAILY
Refills: 30 | COMMUNITY
Start: 2019-01-15 | End: 2019-01-28 | Stop reason: SDUPTHER
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
## 2019-01-28 NOTE — PROGRESS NOTES
Assessment:       1  Aftercare following left shoulder joint replacement surgery          Plan:        Patient is doing well postoperatively  All questions were addressed to the patient's satisfaction  Patient has moved up to [facility] so we revisited the rehab protocol  I gave him a copy to share with his current therapist Follow-up in 2 months Dr Piedad Valente if needed  Subjective:     Patient ID: Fred Burks is a 70 y o  male  Chief Complaint:    HPI     Patient presents to the office for follow-up status post left reverse total shoulder arthroplasty on 11/06/2018  He is making good progress with physical therapy  He has moved up to [facility] so he requested another copy of his rehab protocol  He does have history of neuropathy but denies any new onset numbness or tingling  Social History     Occupational History    [employer] Instructor      [employer]     Social History Main Topics    Smoking status: Former Smoker     Quit date: 1982    Smokeless tobacco: Never Used    Alcohol use No    Drug use: No    Sexual activity: Not on file      Review of Systems   Respiratory: Negative  Musculoskeletal: Positive for arthralgias and myalgias  Skin: Negative  Neurological: Positive for weakness and numbness  Psychiatric/Behavioral: Negative  Objective:     Ortho ExamPhysical Exam   Constitutional: He is oriented to person, place, and time  He appears well-developed and well-nourished  HENT:   Head: Normocephalic  Cardiovascular: Intact distal pulses  Pulmonary/Chest: Effort normal    Musculoskeletal:   Active forward flexion to 120°, abduction to 120°, external rotation to 90°, internal rotation to sacral spine  Neurological: He is alert and oriented to person, place, and time  Skin: Skin is warm and dry  Incision well healed  Psychiatric: He has a normal mood and affect   His behavior is normal

## 2019-01-30 ENCOUNTER — DOCUMENTATION (OUTPATIENT)
Dept: FAMILY MEDICINE CLINIC | Facility: CLINIC | Age: 69
End: 2019-01-30

## 2019-02-07 DIAGNOSIS — J44.9 CHRONIC OBSTRUCTIVE PULMONARY DISEASE, UNSPECIFIED COPD TYPE (HCC): Primary | ICD-10-CM

## 2019-02-13 DIAGNOSIS — F51.01 PRIMARY INSOMNIA: Primary | ICD-10-CM

## 2019-02-13 RX ORDER — TRAZODONE HYDROCHLORIDE 50 MG/1
50 TABLET ORAL
Qty: 30 TABLET | Refills: 0 | Status: SHIPPED | OUTPATIENT
Start: 2019-02-13 | End: 2019-03-16 | Stop reason: SDUPTHER

## 2019-02-25 ENCOUNTER — TELEPHONE (OUTPATIENT)
Dept: FAMILY MEDICINE CLINIC | Facility: CLINIC | Age: 69
End: 2019-02-25

## 2019-02-25 DIAGNOSIS — F41.9 ANXIETY: ICD-10-CM

## 2019-02-25 RX ORDER — HYDROXYZINE PAMOATE 50 MG/1
50 CAPSULE ORAL 3 TIMES DAILY PRN
Qty: 90 CAPSULE | Refills: 0 | Status: SHIPPED | OUTPATIENT
Start: 2019-02-25 | End: 2019-03-25 | Stop reason: SDUPTHER

## 2019-02-25 NOTE — TELEPHONE ENCOUNTER
Pt is requesting a replacement for rx hydroxyzine 25 mg/this rx just isn't cutting pt anxiousness/anxiety  [facility] [address] ,[place]  Call pt when this is done   [phone]

## 2019-02-27 ENCOUNTER — TELEPHONE (OUTPATIENT)
Dept: PULMONOLOGY | Facility: CLINIC | Age: 69
End: 2019-02-27

## 2019-03-11 ENCOUNTER — TELEPHONE (OUTPATIENT)
Dept: FAMILY MEDICINE CLINIC | Facility: CLINIC | Age: 69
End: 2019-03-11

## 2019-03-11 DIAGNOSIS — M47.12 CERVICAL SPONDYLOSIS WITH MYELOPATHY: Primary | ICD-10-CM

## 2019-03-11 NOTE — TELEPHONE ENCOUNTER
Pt wants to make an appt with Neurosurgeon Dr Pietro Patrick but they said they want his family practice to order a MRI in the neck region  If you will not order it they will need to schedule 2 appts  Let pt know what Dr Abdirizak Bentley will do

## 2019-03-16 DIAGNOSIS — F51.01 PRIMARY INSOMNIA: ICD-10-CM

## 2019-03-18 RX ORDER — TRAZODONE HYDROCHLORIDE 50 MG/1
50 TABLET ORAL
Qty: 30 TABLET | Refills: 0 | Status: SHIPPED | OUTPATIENT
Start: 2019-03-18 | End: 2019-04-23 | Stop reason: SDUPTHER

## 2019-03-25 ENCOUNTER — TELEPHONE (OUTPATIENT)
Dept: FAMILY MEDICINE CLINIC | Facility: CLINIC | Age: 69
End: 2019-03-25

## 2019-03-25 DIAGNOSIS — F41.9 ANXIETY: ICD-10-CM

## 2019-03-25 DIAGNOSIS — F40.240 CLAUSTROPHOBIA: Primary | ICD-10-CM

## 2019-03-25 RX ORDER — HYDROXYZINE PAMOATE 50 MG/1
50 CAPSULE ORAL 3 TIMES DAILY PRN
Qty: 90 CAPSULE | Refills: 0 | Status: SHIPPED | OUTPATIENT
Start: 2019-03-25 | End: 2019-04-23 | Stop reason: SDUPTHER

## 2019-03-25 RX ORDER — LORAZEPAM 0.5 MG/1
TABLET ORAL
Qty: 2 TABLET | Refills: 0 | Status: SHIPPED | OUTPATIENT
Start: 2019-03-25 | End: 2019-04-30 | Stop reason: ALTCHOICE

## 2019-03-25 NOTE — TELEPHONE ENCOUNTER
Pt has upcomming mri cervical spine on 04/02/2019 the patient needs rx sedation is claustrophobic [facility] [phone]  call pt any ques [phone]

## 2019-04-01 ENCOUNTER — OFFICE VISIT (OUTPATIENT)
Dept: OBGYN CLINIC | Facility: HOSPITAL | Age: 69
End: 2019-04-01
Payer: MEDICARE

## 2019-04-01 VITALS
HEIGHT: 75 IN | DIASTOLIC BLOOD PRESSURE: 79 MMHG | BODY MASS INDEX: 25.61 KG/M2 | WEIGHT: 206 LBS | HEART RATE: 87 BPM | SYSTOLIC BLOOD PRESSURE: 131 MMHG

## 2019-04-01 DIAGNOSIS — Z96.612 S/P REVERSE TOTAL SHOULDER ARTHROPLASTY, LEFT: Primary | ICD-10-CM

## 2019-04-01 DIAGNOSIS — M12.811 ROTATOR CUFF ARTHROPATHY OF RIGHT SHOULDER: ICD-10-CM

## 2019-04-01 PROBLEM — M25.512 LEFT SHOULDER PAIN: Status: RESOLVED | Noted: 2018-07-12 | Resolved: 2019-04-01

## 2019-04-01 PROBLEM — M19.012 PRIMARY OSTEOARTHRITIS OF LEFT SHOULDER: Status: RESOLVED | Noted: 2018-08-10 | Resolved: 2019-04-01

## 2019-04-01 PROBLEM — M19.012 ARTHRITIS OF LEFT SHOULDER REGION: Status: RESOLVED | Noted: 2018-07-12 | Resolved: 2019-04-01

## 2019-04-01 PROBLEM — Z47.1 AFTERCARE FOLLOWING LEFT SHOULDER JOINT REPLACEMENT SURGERY: Status: RESOLVED | Noted: 2018-11-19 | Resolved: 2019-04-01

## 2019-04-01 PROCEDURE — 20610 DRAIN/INJ JOINT/BURSA W/O US: CPT | Performed by: PHYSICIAN ASSISTANT

## 2019-04-01 PROCEDURE — 99213 OFFICE O/P EST LOW 20 MIN: CPT | Performed by: PHYSICIAN ASSISTANT

## 2019-04-01 RX ORDER — BETAMETHASONE SODIUM PHOSPHATE AND BETAMETHASONE ACETATE 3; 3 MG/ML; MG/ML
6 INJECTION, SUSPENSION INTRA-ARTICULAR; INTRALESIONAL; INTRAMUSCULAR; SOFT TISSUE
Status: COMPLETED | OUTPATIENT
Start: 2019-04-01 | End: 2019-04-01

## 2019-04-01 RX ORDER — BUPIVACAINE HYDROCHLORIDE 2.5 MG/ML
2 INJECTION, SOLUTION INFILTRATION; PERINEURAL
Status: COMPLETED | OUTPATIENT
Start: 2019-04-01 | End: 2019-04-01

## 2019-04-01 RX ADMIN — BUPIVACAINE HYDROCHLORIDE 2 ML: 2.5 INJECTION, SOLUTION INFILTRATION; PERINEURAL at 14:18

## 2019-04-01 RX ADMIN — BETAMETHASONE SODIUM PHOSPHATE AND BETAMETHASONE ACETATE 6 MG: 3; 3 INJECTION, SUSPENSION INTRA-ARTICULAR; INTRALESIONAL; INTRAMUSCULAR; SOFT TISSUE at 14:18

## 2019-04-11 ENCOUNTER — TRANSCRIBE ORDERS (OUTPATIENT)
Dept: NEUROSURGERY | Facility: CLINIC | Age: 69
End: 2019-04-11

## 2019-04-11 DIAGNOSIS — M54.2 NECK PAIN: Primary | ICD-10-CM

## 2019-04-23 DIAGNOSIS — F41.9 ANXIETY: ICD-10-CM

## 2019-04-23 DIAGNOSIS — F51.01 PRIMARY INSOMNIA: ICD-10-CM

## 2019-04-23 RX ORDER — TRAZODONE HYDROCHLORIDE 50 MG/1
50 TABLET ORAL
Qty: 30 TABLET | Refills: 0 | Status: SHIPPED | OUTPATIENT
Start: 2019-04-23 | End: 2019-05-22 | Stop reason: SDUPTHER

## 2019-04-23 RX ORDER — HYDROXYZINE PAMOATE 50 MG/1
50 CAPSULE ORAL 3 TIMES DAILY PRN
Qty: 90 CAPSULE | Refills: 0 | Status: SHIPPED | OUTPATIENT
Start: 2019-04-23 | End: 2019-08-09 | Stop reason: SDUPTHER

## 2019-04-30 ENCOUNTER — DOCUMENTATION (OUTPATIENT)
Dept: NEUROSURGERY | Facility: CLINIC | Age: 69
End: 2019-04-30

## 2019-04-30 ENCOUNTER — OFFICE VISIT (OUTPATIENT)
Dept: NEUROSURGERY | Facility: CLINIC | Age: 69
End: 2019-04-30
Payer: MEDICARE

## 2019-04-30 VITALS
HEART RATE: 63 BPM | SYSTOLIC BLOOD PRESSURE: 129 MMHG | BODY MASS INDEX: 26.33 KG/M2 | RESPIRATION RATE: 16 BRPM | HEIGHT: 75 IN | DIASTOLIC BLOOD PRESSURE: 81 MMHG | WEIGHT: 211.8 LBS

## 2019-04-30 DIAGNOSIS — S14.101A SPINAL CORD INJURY AT C1-C4 LEVEL WITH SPINAL CORD LESION (HCC): ICD-10-CM

## 2019-04-30 DIAGNOSIS — M47.12 CERVICAL SPONDYLOSIS WITH MYELOPATHY: ICD-10-CM

## 2019-04-30 DIAGNOSIS — I50.42 CHRONIC COMBINED SYSTOLIC AND DIASTOLIC CHF (CONGESTIVE HEART FAILURE) (HCC): Primary | ICD-10-CM

## 2019-04-30 DIAGNOSIS — M54.2 NECK PAIN: ICD-10-CM

## 2019-04-30 PROCEDURE — 99215 OFFICE O/P EST HI 40 MIN: CPT | Performed by: NEUROLOGICAL SURGERY

## 2019-04-30 RX ORDER — FEXOFENADINE HCL 180 MG/1
180 TABLET ORAL DAILY
COMMUNITY

## 2019-04-30 RX ORDER — BUDESONIDE AND FORMOTEROL FUMARATE DIHYDRATE 160; 4.5 UG/1; UG/1
2 AEROSOL RESPIRATORY (INHALATION) 2 TIMES DAILY
COMMUNITY

## 2019-05-22 DIAGNOSIS — F51.01 PRIMARY INSOMNIA: ICD-10-CM

## 2019-05-22 RX ORDER — TRAZODONE HYDROCHLORIDE 50 MG/1
50 TABLET ORAL
Qty: 30 TABLET | Refills: 0 | Status: SHIPPED | OUTPATIENT
Start: 2019-05-22 | End: 2019-06-18 | Stop reason: SDUPTHER

## 2019-06-13 DIAGNOSIS — F41.9 ANXIETY: ICD-10-CM

## 2019-06-13 RX ORDER — CARBAMAZEPINE 100 MG/1
100 TABLET, EXTENDED RELEASE ORAL DAILY
Qty: 180 TABLET | Refills: 1 | OUTPATIENT
Start: 2019-06-13

## 2019-06-17 DIAGNOSIS — G89.4 CHRONIC PAIN DISORDER: Primary | ICD-10-CM

## 2019-06-17 DIAGNOSIS — M47.12 CERVICAL SPONDYLOSIS WITH MYELOPATHY: ICD-10-CM

## 2019-06-17 RX ORDER — CARBAMAZEPINE 100 MG/1
100 CAPSULE, EXTENDED RELEASE ORAL 2 TIMES DAILY
Qty: 180 CAPSULE | Refills: 1 | Status: SHIPPED | OUTPATIENT
Start: 2019-06-17 | End: 2019-06-18 | Stop reason: SDUPTHER

## 2019-06-18 DIAGNOSIS — G89.4 CHRONIC PAIN DISORDER: ICD-10-CM

## 2019-06-18 DIAGNOSIS — Z01.818 PREOP EXAMINATION: Primary | ICD-10-CM

## 2019-06-18 DIAGNOSIS — M47.12 CERVICAL SPONDYLOSIS WITH MYELOPATHY: ICD-10-CM

## 2019-06-18 DIAGNOSIS — F51.01 PRIMARY INSOMNIA: ICD-10-CM

## 2019-06-18 RX ORDER — CARBAMAZEPINE 100 MG/1
100 TABLET, EXTENDED RELEASE ORAL 2 TIMES DAILY
Qty: 180 TABLET | Refills: 3 | Status: SHIPPED | OUTPATIENT
Start: 2019-06-18 | End: 2019-09-16

## 2019-06-18 RX ORDER — TRAZODONE HYDROCHLORIDE 50 MG/1
50 TABLET ORAL
Qty: 30 TABLET | Refills: 0 | Status: SHIPPED | OUTPATIENT
Start: 2019-06-18 | End: 2019-07-17 | Stop reason: SDUPTHER

## 2019-06-18 RX ORDER — CARBAMAZEPINE 100 MG/1
100 CAPSULE, EXTENDED RELEASE ORAL 2 TIMES DAILY
Qty: 180 CAPSULE | Refills: 3 | Status: SHIPPED | OUTPATIENT
Start: 2019-06-18 | End: 2019-08-09 | Stop reason: SDUPTHER

## 2019-06-18 RX ORDER — CARBAMAZEPINE 100 MG/1
100 TABLET, EXTENDED RELEASE ORAL 2 TIMES DAILY
COMMUNITY
End: 2019-06-18 | Stop reason: SDUPTHER

## 2019-07-17 DIAGNOSIS — F51.01 PRIMARY INSOMNIA: ICD-10-CM

## 2019-07-17 RX ORDER — TRAZODONE HYDROCHLORIDE 50 MG/1
50 TABLET ORAL
Qty: 30 TABLET | Refills: 0 | OUTPATIENT
Start: 2019-07-17

## 2019-07-17 RX ORDER — TRAZODONE HYDROCHLORIDE 50 MG/1
50 TABLET ORAL
Qty: 30 TABLET | Refills: 11 | Status: SHIPPED | OUTPATIENT
Start: 2019-07-17

## 2019-08-01 DIAGNOSIS — F51.01 PRIMARY INSOMNIA: ICD-10-CM

## 2019-08-01 RX ORDER — QUETIAPINE FUMARATE 50 MG/1
50 TABLET, FILM COATED ORAL
Qty: 30 TABLET | Refills: 5 | Status: SHIPPED | OUTPATIENT
Start: 2019-08-01 | End: 2020-01-26

## 2019-08-02 ENCOUNTER — TRANSITIONAL CARE MANAGEMENT (OUTPATIENT)
Dept: FAMILY MEDICINE CLINIC | Facility: CLINIC | Age: 69
End: 2019-08-02

## 2019-08-09 ENCOUNTER — OFFICE VISIT (OUTPATIENT)
Dept: FAMILY MEDICINE CLINIC | Facility: CLINIC | Age: 69
End: 2019-08-09
Payer: MEDICARE

## 2019-08-09 VITALS
WEIGHT: 217.6 LBS | HEART RATE: 72 BPM | HEIGHT: 75 IN | BODY MASS INDEX: 27.06 KG/M2 | SYSTOLIC BLOOD PRESSURE: 126 MMHG | DIASTOLIC BLOOD PRESSURE: 84 MMHG | TEMPERATURE: 99.6 F | OXYGEN SATURATION: 94 %

## 2019-08-09 DIAGNOSIS — I50.42 CHRONIC COMBINED SYSTOLIC AND DIASTOLIC CHF (CONGESTIVE HEART FAILURE) (HCC): Primary | ICD-10-CM

## 2019-08-09 DIAGNOSIS — F41.9 ANXIETY: ICD-10-CM

## 2019-08-09 PROCEDURE — 99214 OFFICE O/P EST MOD 30 MIN: CPT | Performed by: NURSE PRACTITIONER

## 2019-08-09 PROCEDURE — 1123F ACP DISCUSS/DSCN MKR DOCD: CPT | Performed by: NURSE PRACTITIONER

## 2019-08-09 RX ORDER — HYDROXYZINE PAMOATE 50 MG/1
50 CAPSULE ORAL 3 TIMES DAILY PRN
Qty: 90 CAPSULE | Refills: 0 | Status: SHIPPED | OUTPATIENT
Start: 2019-08-09

## 2019-08-09 RX ORDER — TAMSULOSIN HYDROCHLORIDE 0.4 MG/1
0.4 CAPSULE ORAL
COMMUNITY
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
## 2020-01-25 DIAGNOSIS — F51.01 PRIMARY INSOMNIA: ICD-10-CM

## 2020-01-25 DIAGNOSIS — F41.9 ANXIETY: ICD-10-CM

## 2020-01-26 RX ORDER — DULOXETIN HYDROCHLORIDE 60 MG/1
CAPSULE, DELAYED RELEASE ORAL
Qty: 30 CAPSULE | Refills: 0 | Status: SHIPPED | OUTPATIENT
Start: 2020-01-26 | End: 2020-02-18

## 2020-01-26 RX ORDER — QUETIAPINE FUMARATE 50 MG/1
TABLET, FILM COATED ORAL
Qty: 30 TABLET | Refills: 0 | Status: SHIPPED | OUTPATIENT
Start: 2020-01-26

## 2020-01-29 DIAGNOSIS — M79.2 NEUROPATHIC PAIN: ICD-10-CM

## 2020-01-29 RX ORDER — GABAPENTIN 300 MG/1
CAPSULE ORAL
Qty: 60 CAPSULE | Refills: 0 | Status: SHIPPED | OUTPATIENT
Start: 2020-01-29 | End: 2020-02-23

## 2020-02-18 DIAGNOSIS — F41.9 ANXIETY: ICD-10-CM

## 2020-02-18 RX ORDER — DULOXETIN HYDROCHLORIDE 60 MG/1
CAPSULE, DELAYED RELEASE ORAL
Qty: 30 CAPSULE | Refills: 0 | Status: SHIPPED | OUTPATIENT
Start: 2020-02-18 | End: 2020-04-01

## 2020-02-23 DIAGNOSIS — M79.2 NEUROPATHIC PAIN: ICD-10-CM

## 2020-02-23 RX ORDER — GABAPENTIN 300 MG/1
CAPSULE ORAL
Qty: 60 CAPSULE | Refills: 0 | Status: SHIPPED | OUTPATIENT
Start: 2020-02-23

## 2020-04-01 DIAGNOSIS — F41.9 ANXIETY: ICD-10-CM

## 2020-04-01 RX ORDER — DULOXETIN HYDROCHLORIDE 60 MG/1
CAPSULE, DELAYED RELEASE ORAL
Qty: 30 CAPSULE | Refills: 1 | Status: SHIPPED | OUTPATIENT
Start: 2020-04-01

## 2020-07-08 DIAGNOSIS — M47.12 CERVICAL SPONDYLOSIS WITH MYELOPATHY: ICD-10-CM

## 2020-07-08 RX ORDER — CARBAMAZEPINE 100 MG/1
TABLET, EXTENDED RELEASE ORAL
Qty: 60 TABLET | Refills: 1 | OUTPATIENT
Start: 2020-07-08

## 2020-07-24 DIAGNOSIS — M47.12 CERVICAL SPONDYLOSIS WITH MYELOPATHY: ICD-10-CM

## 2020-07-24 RX ORDER — CARBAMAZEPINE 100 MG/1
TABLET, EXTENDED RELEASE ORAL
Qty: 56 TABLET | Refills: 0 | OUTPATIENT
Start: 2020-07-24

## 2020-08-26 ENCOUNTER — TELEPHONE (OUTPATIENT)
Dept: FAMILY MEDICINE CLINIC | Facility: CLINIC | Age: 70
End: 2020-08-26

## 2020-08-26 NOTE — TELEPHONE ENCOUNTER
Héctor Rojas called to let us know that her dad passed away in his apartment yesterday    They believe that it was a blood clot

## 2022-04-30 NOTE — PROGRESS NOTES
DATE OF SURGERY:    03/29/2018    SURGEON:  Patricio Pham M.D.    ATTENDING PHYSICIAN:  Hiram Diaz M.D.    ADDITIONAL ATTENDING:  Janes Boothe MD    ASSISTANT SURGEON: Azeem Allison M.D.    PREOPERATIVE DIAGNOSIS:  Right knee anterior cruciate ligament tear with medial and lateral meniscus tears.    POSTOPERATIVE DIAGNOSIS:  Right knee anterior cruciate ligament tear with medial and lateral meniscus tears.    PROCEDURE PERFORMED:    1. Right knee examination under anesthesia  2. Right knee anterior cruciate ligament reconstruction with hamstring autograft  3. Arthroscopic repair of right medial and lateral menisci.    IMPLANTS:  20 mm [employer] EndoButton and 10 mm tenodesis screw.    ANESTHESIA:  General.    ESTIMATED BLOOD LOSS:  30 cc.    COMPLICATIONS:  None.    INDICATIONS:  The patient is a 20-year-old male who sustained a right knee injury just over a month ago while playing basketball.  He was initially seen in the orthopedic clinic and was sent for an MRI.  The MRI showed that he had a right ACL tear as well as tears of the medial and lateral menisci.  He also had a partial MCL tear.  At the time, risks and benefits of surgery were discussed.  He was offered a right ACL reconstruction with meniscal repair versus debridement.  His MCL had clinically healed from the time of his injury, and his knee was found to be stable to valgus stress at 0 and 30 degrees of flexion.  This was symmetric to the examination of the contralateral knee as well. Patient elected to proceed with surgery.    PROCEDURE IN DETAIL:  After risks, benefits, and alternatives of the procedure were discussed in detail, informed consent was obtained from the patient.  The patient was taken to the operating room and administered general anesthesia.  He was placed in the supine position.  A tourniquet was applied to the right upper thigh.  The right leg was placed in a leg kruger.  The right lower extremity was then prepped and  Assessment/Plan:    No problem-specific Assessment & Plan notes found for this encounter  Diagnoses and all orders for this visit:    Chronic combined systolic and diastolic CHF (congestive heart failure) (HCC)  -     CBC and differential; Future  -     Comprehensive metabolic panel; Future  -     Lipid Panel with Direct LDL reflex; Future    Pneumonia of left lower lobe due to infectious organism ([facility] [address] )  -     XR chest pa & lateral; Future    Anxiety  -     hydrOXYzine pamoate (VISTARIL) 25 mg capsule; Take 1 capsule (25 mg total) by mouth 3 (three) times a day as needed for anxiety  -     DULoxetine (CYMBALTA) 60 mg delayed release capsule; Take 1 capsule (60 mg total) by mouth daily  -     carBAMazepine (TEGretol XR) 100 mg 12 hr tablet; Take 1 tablet (100 mg total) by mouth daily    Primary insomnia  -     QUEtiapine (SEROquel) 50 mg tablet; Take 1 tablet (50 mg total) by mouth daily at bedtime    Medicare annual wellness visit, initial    Chronic venous stasis dermatitis  -     ammonium lactate (LAC-HYDRIN) 12 % lotion; Apply topically 2 (two) times a day as needed for dry skin    Screening PSA (prostate specific antigen)  -     PSA, Total Screen; Future    Neuropathic pain  -     gabapentin (NEURONTIN) 600 MG tablet; Take 1 tablet (600 mg total) by mouth daily at bedtime  -     gabapentin (NEURONTIN) 300 mg capsule; 300 mg in am,  300 in afternoon        Update labs  Repeat CXR to f/u pneumonia  Hydroxyzine to use PRN anxiety  Patient previously had issues with benzodiazepine abuse  Follow up with cardiology  Start [address] for dermatitis  BMI Counseling: Body mass index is 25 87 kg/m²  Discussed the patient's BMI with him  The BMI is above average  BMI counseling and education was provided to the patient  Nutrition recommendations include moderation in carbohydrate intake  Exercise recommendations include exercising 3-5 times per week  F/U 3-4 months with labs      Subjective:      Patient ID: Gilford Blanc is a 70 y o  male  He is here for a check up  He had a shoulder replacement (L) in November  He states he is doing quite well  He saw Orthopedics today  He had spent time at [facility]  He was re-admitted to the hospital in November for hospital acquired pneumonia  Reviewed CT scan - has not had repeat imaging yet  Over the holidays he developed symptoms of CHF exacerbation and was admitted to hospital in [place]  He now has a cardiologist in [place], Dr Aziza Calderon [facility]  He weighs himself regularly  Checks BP regularly  He is now living in [place] in an apartment  Sometimes he feels anxious - feels he may be having panic attacks slightly  He thinks it is just due to the transition  He is on several psychiatric medications including Seroquel, Cymbalta, Trazodone  He is asking to go back up ton 50 mg Seroquel which he was previously on  At one point during the hospitalization his Seroquel was changed  He is also on Carbamezapine - he believes this was started on this by psychiatry about a year ago  He was in rehab for benzo use  His nurse had called re: multiple drug interactions  Most of the medications are prescribed by his cardiologist  One of the medications under concern was Carbamezapine  Pt states he has been on carbamazepine with no issues and wants to continue it  He has very dry skin on his legs, scratches it  The following portions of the patient's history were reviewed and updated as appropriate:   He  has a past medical history of Anxiety; Arthritis; Atrial fibrillation ([address] ); CAD (coronary artery disease); Cardiac disease; Cervicalgia; Chronic diastolic CHF (congestive heart failure) ([address] ); Chronic pain; Chronic pain disorder; COPD (chronic obstructive pulmonary disease) ([address] ); Hyperlipidemia; Hypertension; Myocardial infarction ([address] ) (1982, 1983, 1988); Neuropathy; Shortness of breath; and Sleep apnea    He draped in the usual sterile fashion.  Time-out was performed confirming the correct patient, procedure, operative side, operative site, site gareth, allergies, and appropriate preoperative antibiotics.  The tourniquet was inflated to 300 mmHg for this procedure.  Prior to starting the surgery, we performed an examination under anesthesia of the right knee.  The patient was found to have a positive Lachman's, graded as 3B.  Additionally, he had a positive anterior drawer, negative posterior drawer.  He had a positive pivot shift.  The knee was stable to varus and valgus stress at 0 and 30 degrees of flexion.  He had no mechanical blocks to motion.  Of note, he did appear to be somewhat hypermobile.  He did have increased patella laxity that was symmetric.  He also had slight recurvatum of the knees bilaterally.     We then began the surgery.  We created a small superomedial outflow portal and then established our anterolateral portal.  We inserted the camera and performed our diagnostic arthroscopy.  We went up to the suprapatellar pouch and examined the medial and lateral gutters.  There were no loose bodies found.  We examined the patellofemoral joint and the cartilage appeared healthy.  We brought the knee through motion and the patella appeared to track slightly laterally in the trochlear groove.  We then followed the femoral condyle down, and the cartilage appeared healthy.  We first viewed the notch and found that there was a complete tear of the ACL, and the PCL appeared intact.  We then went into the lateral compartment.  The cartilage on the lateral tibial plateau and femoral condyle appeared healthy.  There was a vertical tear in the posterior horn of the lateral meniscus.  The popliteus tendon was also observed and was found to be healthy.  We then went to the medial compartment.  The cartilage appeared healthy and we found a vertical tear of the posterior horn in the medial meniscus.  We then removed the  Patient Active Problem List    Diagnosis Date Noted    Aftercare following left shoulder joint replacement surgery 11/19/2018    SOB (shortness of breath) 11/08/2018    S/P reverse total shoulder arthroplasty, left 11/07/2018    Primary osteoarthritis of left shoulder 08/10/2018    Arthritis of left shoulder region 07/12/2018    Left shoulder pain 07/12/2018    Screening for colon cancer 06/05/2018    Olecranon bursitis of right elbow 02/02/2018    Pressure ulcer of left elbow 11/13/2017    COPD [facility]chronic obstructive pulmonary disease) [facility])     ASCVD (arteriosclerotic cardiovascular disease)     Chronic combined systolic and diastolic CHF (congestive heart failure) ([facility])     Paroxysmal atrial fibrillation ([facility])     Coronary artery disease involving native coronary artery     Chronic pain disorder 11/07/2016    Gait instability 10/20/2016    Chronic atrial fibrillation ([facility] [address] ) 08/18/2016    Hypertension, essential, benign 08/18/2016    Insomnia 01/29/2016    Anxiety 08/03/2015    Cervical spondylosis with myelopathy 06/16/2015    Dyslipidemia 03/17/2015    Depression 05/14/2012     He  has a past surgical history that includes Cervical fusion (10/21/2015); Appendectomy; pr repair of hammertoe,one (Right, 6/12/2018); pr colonoscopy flx dx w/collj spec when pfrmd (N/A, 6/21/2018); Other surgical history; Cardiac surgery; Coronary artery bypass graft (4001); Cardiac catheterization; and pr reconstr total shoulder implant (Left, 11/6/2018)  His family history includes Heart failure in his mother; Sudden death in his father  He  reports that he quit smoking about 37 years ago  He has never used smokeless tobacco  He reports that he does not drink alcohol or use drugs    Current Outpatient Prescriptions   Medication Sig Dispense Refill    carBAMazepine (TEGretol XR) 100 mg 12 hr tablet Take 1 tablet (100 mg total) by mouth daily 180 tablet 1    gabapentin (NEURONTIN) 300 mg capsule 300 mg in am, camera to perform our hamstring harvest.      We made a longitudinal incision centered over the pes tendon insertion in the proximal medial tibia.  We incised down to identify sartorial fascia, then incised to the bone.  There was some scar tissue from the patient's MCL injury as well.  We were able to isolate out his hamstring tendons.  We grasped them each with FiberLoop and then used a fat stripper to obtain our grafts.  We obtained grafts of the sartorius, semitendinosus, and gracilis.  The grafts were then taken to the back table and prepared in standard fashion.  Our autograft measured 10 mm in diameter.      We then inserted the camera back into the knee to continue our arthroscopy.  We first went to the medial joint space and established our anteromedial portal.  We then repaired the vertical tear in the posterior horn of the medial meniscus using 2 Fast-Fix devices via all-inside technique.  Once satisfied with our repair, we went to the lateral joint space.  This tear was found to be somewhat smaller, and we repaired it with 1 Fast-Fix using all-inside technique.  Once satisfied with our meniscal repair, we then debrided the remaining ACL stump.  We probed the PCL, and it was found to be intact.  We identified both the ACL origin along the lateral femoral condyle as well as its insertion on the tibia.  These places were marked, and then we established our accessory anteromedial portal.  We hyperflexed the knee and drove the guidewire up through the center of the ACL origin in the femur to begin creating our femoral tunnel.  This guidewire exited the distal lateral femur.  We then over drilled and then reamed the tunnel to 10 mm.  We made sure not to violate the lateral cortex with our large reamer.  The tunnel length measured just over 40 mm in length.  We then went to prepare our tibial tunnel.  We used the tibial guide and again drove the guidewire up through our incision that we used for the hamstring  300 in afternoon 180 capsule 3    ammonium lactate (LAC-HYDRIN) 12 % lotion Apply topically 2 (two) times a day as needed for dry skin 400 g 0    apixaban (ELIQUIS) 5 mg Take 2 5 mg by mouth 2 (two) times a day        aspirin 81 mg chewable tablet Chew 1 tablet daily (Patient taking differently: Chew 81 mg daily  )  0    atorvastatin (LIPITOR) 40 mg tablet Take 40 mg by mouth daily        bumetanide (BUMEX) 1 mg tablet Take 1 mg by mouth 2 (two) times a day  0    cetirizine (ZyrTEC) 10 mg tablet Take 10 mg by mouth daily      DULoxetine (CYMBALTA) 60 mg delayed release capsule Take 1 capsule (60 mg total) by mouth daily 90 capsule 3    gabapentin (NEURONTIN) 600 MG tablet Take 1 tablet (600 mg total) by mouth daily at bedtime 90 tablet 3    guaiFENesin (ROBITUSSIN) 100 MG/5ML oral liquid Take 100 mg by mouth 3 (three) times a day as needed for cough      hydrOXYzine pamoate (VISTARIL) 25 mg capsule Take 1 capsule (25 mg total) by mouth 3 (three) times a day as needed for anxiety 30 capsule 2    ibuprofen (MOTRIN) 400 mg tablet Take 400 mg by mouth every 6 (six) hours as needed for mild pain      ipratropium-albuterol (DUO-NEB) 0 5-2 5 mg/3 mL nebulizer solution Take 1 vial (3 mL total) by nebulization 4 (four) times a day  0    isosorbide mononitrate (IMDUR) 30 mg 24 hr tablet Take 30 mg by mouth daily        KLOR-CON M20 20 MEQ tablet Take 40 mEq by mouth daily  0    magnesium hydroxide ([employer] MILK OF MAGNESIA) 400 mg/5 mL oral suspension Take by mouth daily as needed for constipation      MELATONIN PO Take by mouth daily at bedtime        Menthol (BENGAY ULTRA STRENGTH) 5 % PTCH Apply topically      menthol-cetylpyridinium (CEPACOL) 3 MG lozenge Take 1 lozenge by mouth as needed for sore throat      metolazone (ZAROXOLYN) 2 5 mg tablet Take 2 5 mg by mouth every other day  0    nitroglycerin (NITROSTAT) 0 4 mg SL tablet Place 0 4 mg under the tongue every 5 (five) minutes as needed for chest grafts and then over reamed with a 10 mm reamer.  Once our tunnels had been prepared, we passed the graft.  The graft was fixed in the femoral tunnel with a 20 mm Endobutton.  We took 1 x-ray shot to confirm that the button had successfully flipped and was seated well against the lateral cortex of the femur.  We then tensioned our graft, flexing the knee to about 20 degrees while applying posterior drawer force.  We then fixed the tibial tunnel with a 10 mm interference screw.  We inserted the camera again to observe our graft.  The graft was intact, and the knee was brought through range of motion.  There was no impingement.  We then irrigated the wounds, drained all the fluid from the knee, and closed the incisions with deep Vicryl and nylon on the skin.  Sterile dressing was placed, and the patient was placed into a hinged knee brace.     The patient was then extubated and taken to the recovery room in stable condition, having suffered no apparent complications from the procedure.    DRAINS:  None.    SPECIMENS:  None.    DISPOSITION:  The patient will be discharged home and can be weightbearing as tolerated on the right lower extremity in the hinged knee brace with the knee locked in extension.  He can undergo full motion while not weightbearing. We will bring him back to clinic in 2 weeks for a wound check and suture removal.  We will then begin a progressive ACL with meniscal repair rehab program with physical therapy.     Dr. Diaz and Dr. Boothe were present for all key portions of the procedure.        ______________________________  Patricio Pham MD    ______________________________  Hiram Diaz M.D.    RADHA/JOE  DD:  03/30/2018  Time:  02:47PM  DT:  03/30/2018  Time:  03:41PM  Job #:  [id number]        pain       polyethylene glycol (MIRALAX) powder Take 17 g by mouth daily      polyethylene glycol-propylene glycol (SYSTANE) 0 4-0 3 % Administer 1 drop to both eyes every 3 (three) hours as needed        QUEtiapine (SEROquel) 50 mg tablet Take 1 tablet (50 mg total) by mouth daily at bedtime 90 tablet 1    SPIRIVA HANDIHALER 18 MCG inhalation capsule       spironolactone (ALDACTONE) 25 mg tablet 25 mg daily        torsemide (DEMADEX) 20 mg tablet Take 40 mg by mouth daily        traZODone (DESYREL) 50 mg tablet 50 mg daily at bedtime         No current facility-administered medications for this visit  Current Outpatient Prescriptions on File Prior to Visit   Medication Sig    apixaban (ELIQUIS) 5 mg Take 2 5 mg by mouth 2 (two) times a day      aspirin 81 mg chewable tablet Chew 1 tablet daily (Patient taking differently: Chew 81 mg daily  )    atorvastatin (LIPITOR) 40 mg tablet Take 40 mg by mouth daily      cetirizine (ZyrTEC) 10 mg tablet Take 10 mg by mouth daily    guaiFENesin (ROBITUSSIN) 100 MG/5ML oral liquid Take 100 mg by mouth 3 (three) times a day as needed for cough    ibuprofen (MOTRIN) 400 mg tablet Take 400 mg by mouth every 6 (six) hours as needed for mild pain    ipratropium-albuterol (DUO-NEB) 0 5-2 5 mg/3 mL nebulizer solution Take 1 vial (3 mL total) by nebulization 4 (four) times a day    isosorbide mononitrate (IMDUR) 30 mg 24 hr tablet Take 30 mg by mouth daily      magnesium hydroxide ([employer] MILK OF MAGNESIA) 400 mg/5 mL oral suspension Take by mouth daily as needed for constipation    MELATONIN PO Take by mouth daily at bedtime      Menthol (BENGAY ULTRA STRENGTH) 5 % PTCH Apply topically    menthol-cetylpyridinium (CEPACOL) 3 MG lozenge Take 1 lozenge by mouth as needed for sore throat    nitroglycerin (NITROSTAT) 0 4 mg SL tablet Place 0 4 mg under the tongue every 5 (five) minutes as needed for chest pain      polyethylene glycol (MIRALAX) powder Take 17 g by mouth daily    polyethylene glycol-propylene glycol (SYSTANE) 0 4-0 3 % Administer 1 drop to both eyes every 3 (three) hours as needed      SPIRIVA HANDIHALER 18 MCG inhalation capsule     spironolactone (ALDACTONE) 25 mg tablet 25 mg daily      torsemide (DEMADEX) 20 mg tablet Take 40 mg by mouth daily      traZODone (DESYREL) 50 mg tablet 50 mg daily at bedtime       No current facility-administered medications on file prior to visit  He has No Known Allergies       Review of Systems   Constitutional: Negative for activity change, appetite change, fatigue and unexpected weight change  Respiratory: Negative for chest tightness and shortness of breath  Cardiovascular: Positive for leg swelling  Negative for chest pain  Gastrointestinal: Negative for abdominal pain  Skin: Positive for rash  Dry skin   Neurological: Negative for headaches  Psychiatric/Behavioral: The patient is nervous/anxious  Objective:      /64   Pulse 72   Temp 99 4 °F (37 4 °C)   Ht 6' 3" (1 905 m)   Wt 93 9 kg (207 lb)   SpO2 96%   BMI 25 87 kg/m²          Physical Exam   Constitutional: He is oriented to person, place, and time  He appears well-developed and well-nourished  No distress  HENT:   Head: Normocephalic and atraumatic  Cardiovascular: Normal rate and normal heart sounds  An irregularly irregular rhythm present  Exam reveals no gallop and no friction rub  No murmur heard  click   Pulmonary/Chest: Effort normal and breath sounds normal  No respiratory distress  He has no wheezes  He has no rales  He exhibits no tenderness  Musculoskeletal: He exhibits edema (b/l lower ext edema, trace)  Neurological: He is alert and oriented to person, place, and time  Gait abnormal    Using a cane   Skin: Skin is dry  He is not diaphoretic  Diffuse dry skin   Psychiatric: He has a normal mood and affect   His behavior is normal  Judgment and thought content normal    Nursing note and vitals reviewed

## 2024-03-31 NOTE — TELEPHONE ENCOUNTER
Answered questions  Cyrus Avendañoick just moved to [place] to be with daughter while recovers  Saul Jaimes just wanted to see if there were any restrictions  After 2 months there are no real restrictions other than heavy lifting since limit is 25#  Recommended not forcing ER or IR and told him they usually come in time 
Wanda Mireles who is the occupational therapist for [facility] in [place] called in  Patient is seeing the therapist and would like a callback to process evaluation on what he can do or can not do for the patient  Per Catherine Easley patient had a reversal shoulder replacement (L) and would like to know the protocol  Please advise      Callback# [phone]
Attending with

## (undated) DEVICE — SUT 2 ORTHOCORD 36 IN W/O NEEDLES

## (undated) DEVICE — Device

## (undated) DEVICE — PACK MAJOR ORTHO W/SPLITS PBDS

## (undated) DEVICE — DRAPE SHEET THREE QUARTER

## (undated) DEVICE — SUT VICRYL PLUS 0 CTB-1 27 IN VCPB260H

## (undated) DEVICE — KIT STABILIZATION SHOULDER MARCO

## (undated) DEVICE — HEAVY DUTY TABLE COVER: Brand: CONVERTORS

## (undated) DEVICE — CHLORAPREP HI-LITE 26ML ORANGE

## (undated) DEVICE — DRESSING MEPILEX AG BORDER 4 X 8 IN

## (undated) DEVICE — ARTHROSCOPY FLOOR MAT

## (undated) DEVICE — DRILL BIT 3.2MM

## (undated) DEVICE — INTENDED FOR TISSUE SEPARATION, AND OTHER PROCEDURES THAT REQUIRE A SHARP SURGICAL BLADE TO PUNCTURE OR CUT.: Brand: BARD-PARKER SAFETY BLADES SIZE 10, STERILE

## (undated) DEVICE — SUT ETHILON 4-0 PS-2 18 IN 1667H

## (undated) DEVICE — CAPIT ASCEND FLEX REVERSE W PERFORM

## (undated) DEVICE — ACE WRAP 3 IN UNSTERILE

## (undated) DEVICE — WET SKIN PREP TRAY: Brand: MEDLINE INDUSTRIES, INC.

## (undated) DEVICE — INTENDED FOR TISSUE SEPARATION, AND OTHER PROCEDURES THAT REQUIRE A SHARP SURGICAL BLADE TO PUNCTURE OR CUT.: Brand: BARD-PARKER ® CARBON RIB-BACK BLADES

## (undated) DEVICE — HOOD: Brand: FLYTE, SURGICOOL

## (undated) DEVICE — UNIVERSAL  MINOR EXTREMITY PK: Brand: CARDINAL HEALTH

## (undated) DEVICE — GLOVE INDICATOR PI UNDERGLOVE SZ 7.5 BLUE

## (undated) DEVICE — THE SIMPULSE SOLO SYSTEM WITH ULTREX RETRACTABLE SPLASH SHIELD TIP: Brand: SIMPULSE SOLO

## (undated) DEVICE — SYRINGE 10ML LL

## (undated) DEVICE — SUT VICRYL 3-0 SH 27 IN J416H

## (undated) DEVICE — INTENT TO BE USED WITH SUTURE MATERIAL FOR TISSUE CLOSURE: Brand: RICHARD-ALLAN®  NEEDLE 1/2 CIRCLE REVERSE CUTTING

## (undated) DEVICE — ACE WRAP 4 IN UNSTERILE

## (undated) DEVICE — SUT 2 ORTHOCORD MO7

## (undated) DEVICE — CUFF TOURNIQUET 18 X 4 IN QUICK CONNECT DISP 1 BLADDER

## (undated) DEVICE — 3M™ IOBAN™ 2 ANTIMICROBIAL INCISE DRAPE 6650EZ: Brand: IOBAN™ 2

## (undated) DEVICE — GAUZE SPONGES,16 PLY: Brand: CURITY

## (undated) DEVICE — PROXIMATE PLUS MD MULTI-DIRECTIONAL RELEASE SKIN STAPLERS CONTAINS 35 STAINLESS STEEL STAPLES APPROXIMATE CLOSED DIMENSIONS: 6.9MM X 3.9MM WIDE: Brand: PROXIMATE

## (undated) DEVICE — DRAPE EQUIPMENT RF WAND

## (undated) DEVICE — OCCLUSIVE GAUZE STRIP,3% BISMUTH TRIBROMOPHENATE IN PETROLATUM BLEND: Brand: XEROFORM

## (undated) DEVICE — NEEDLE 25G X 1 1/2

## (undated) DEVICE — DUAL CUT SAGITTAL BLADE

## (undated) DEVICE — BUTTON SWITCH PENCIL HOLSTER: Brand: VALLEYLAB

## (undated) DEVICE — STOCKINETTE REGULAR

## (undated) DEVICE — GUIDE PIN 2.5 X 220MM AEQUALIS PERFORM PLUS

## (undated) DEVICE — REM POLYHESIVE ADULT PATIENT RETURN ELECTRODE: Brand: VALLEYLAB

## (undated) DEVICE — SUPER SPONGES,MEDIUM: Brand: KERLIX

## (undated) DEVICE — SCREW CENTRAL 6.5 X 25MM: Type: IMPLANTABLE DEVICE | Site: SHOULDER | Status: NON-FUNCTIONAL

## (undated) DEVICE — SUT VICRYL PLUS 2-0 CTB-1 27 IN VCPB259H

## (undated) DEVICE — IMPERVIOUS STOCKINETTE: Brand: DEROYAL

## (undated) DEVICE — SPONGE PVP SCRUB WING STERILE

## (undated) DEVICE — NEEDLE BLUNT 18 G X 1 1/2IN

## (undated) DEVICE — GLOVE SRG BIOGEL 7.5